# Patient Record
Sex: FEMALE | Race: WHITE | NOT HISPANIC OR LATINO | Employment: FULL TIME | URBAN - METROPOLITAN AREA
[De-identification: names, ages, dates, MRNs, and addresses within clinical notes are randomized per-mention and may not be internally consistent; named-entity substitution may affect disease eponyms.]

---

## 2017-01-16 DIAGNOSIS — D25.9 UTERINE LEIOMYOMA, UNSPECIFIED LOCATION: ICD-10-CM

## 2017-01-16 NOTE — TELEPHONE ENCOUNTER
Irene desire refill of OCP. Annual exam scheduled   Patient Active Problem List   Diagnosis    Bilateral hearing loss    Fibroids     Prior to Admission medications    Medication Sig Start Date End Date Taking? Authorizing Provider   clotrimazole-betamethasone 1-0.05% (LOTRISONE) cream Apply to affected area 2 times daily as needed for itching 2/8/16 2/7/17  Dee Worley MD   fluticasone (FLONASE) 50 mcg/actuation nasal spray 2 sprays by Each Nare route once daily. 9/15/16   Kayleigh Kauffman NP   LO LOESTRIN FE 1 mg-10 mcg (24)/10 mcg (2) Tab Take 1 tablet by mouth once daily. 2/8/16   Dee Worley MD   meclizine (ANTIVERT) 12.5 mg tablet Take 1 tablet (12.5 mg total) by mouth 3 (three) times daily as needed. 6/6/13 6/16/13  Lesli Dowell MD

## 2017-01-17 RX ORDER — NORETHINDRONE ACETATE AND ETHINYL ESTRADIOL, ETHINYL ESTRADIOL AND FERROUS FUMARATE 1MG-10(24)
1 KIT ORAL DAILY
Qty: 84 TABLET | Refills: 0 | Status: SHIPPED | OUTPATIENT
Start: 2017-01-17 | End: 2017-02-20 | Stop reason: SDUPTHER

## 2017-02-20 ENCOUNTER — HOSPITAL ENCOUNTER (OUTPATIENT)
Dept: RADIOLOGY | Facility: HOSPITAL | Age: 52
Discharge: HOME | End: 2017-02-20
Attending: OBSTETRICS & GYNECOLOGY
Payer: COMMERCIAL

## 2017-02-20 ENCOUNTER — OFFICE VISIT (OUTPATIENT)
Dept: OBSTETRICS AND GYNECOLOGY | Facility: CLINIC | Age: 52
End: 2017-02-20
Payer: COMMERCIAL

## 2017-02-20 VITALS
HEART RATE: 78 BPM | HEIGHT: 61 IN | DIASTOLIC BLOOD PRESSURE: 68 MMHG | BODY MASS INDEX: 35.68 KG/M2 | WEIGHT: 189 LBS | RESPIRATION RATE: 10 BRPM | SYSTOLIC BLOOD PRESSURE: 120 MMHG

## 2017-02-20 DIAGNOSIS — Z01.419 WELL WOMAN EXAM WITH ROUTINE GYNECOLOGICAL EXAM: Primary | ICD-10-CM

## 2017-02-20 DIAGNOSIS — Z12.31 ENCOUNTER FOR SCREENING MAMMOGRAM FOR BREAST CANCER: ICD-10-CM

## 2017-02-20 DIAGNOSIS — D25.9 UTERINE LEIOMYOMA, UNSPECIFIED LOCATION: ICD-10-CM

## 2017-02-20 DIAGNOSIS — Z12.4 CERVICAL CANCER SCREENING: ICD-10-CM

## 2017-02-20 PROCEDURE — 88175 CYTOPATH C/V AUTO FLUID REDO: CPT

## 2017-02-20 PROCEDURE — 99396 PREV VISIT EST AGE 40-64: CPT | Mod: S$GLB,,, | Performed by: OBSTETRICS & GYNECOLOGY

## 2017-02-20 PROCEDURE — 77063 BREAST TOMOSYNTHESIS BI: CPT | Mod: 26,,, | Performed by: RADIOLOGY

## 2017-02-20 PROCEDURE — 77067 SCR MAMMO BI INCL CAD: CPT | Mod: 26,,, | Performed by: RADIOLOGY

## 2017-02-20 PROCEDURE — 99999 PR PBB SHADOW E&M-EST. PATIENT-LVL III: CPT | Mod: PBBFAC,,, | Performed by: OBSTETRICS & GYNECOLOGY

## 2017-02-20 PROCEDURE — 77067 SCR MAMMO BI INCL CAD: CPT | Mod: TC

## 2017-02-20 RX ORDER — NORETHINDRONE ACETATE AND ETHINYL ESTRADIOL, ETHINYL ESTRADIOL AND FERROUS FUMARATE 1MG-10(24)
1 KIT ORAL DAILY
Qty: 28 TABLET | Refills: 11 | Status: SHIPPED | OUTPATIENT
Start: 2017-02-20 | End: 2018-02-06 | Stop reason: SDUPTHER

## 2017-02-21 NOTE — PROGRESS NOTES
Subjective:    Patient ID: Irene Galo is a 51 y.o. female.     Chief Complaint: Annual Well Woman Exam     History of Present Illness:  Irene presents today for Annual Well Woman exam. She states her menses are irregular and less frequent.  She reports that her menstrual flow is light with minimal cramping. She denies pelvic pain. She is known to have uterine fibroids. She denies breast tenderness, masses, nipple discharge.  She is due for screening mammogram.  She reports no problems with urination. Bowel movements have not significantly changed. Her current contraceptive method is OCPs which she desires to continue.  She has health maintenance labs done at work and is current with this.      The following portions of the patient's history were reviewed and updated as appropriate: allergies, current medications, past family history, past medical history, past social history, past surgical history and problem list.      Menstrual History:   Patient's last menstrual period was 2017..     OB History      Para Term  AB TAB SAB Ectopic Multiple Living    4 4 4      1 5            Review of Systems   All other systems reviewed and are negative.        Objective:    Vital Signs:  Vitals:    17 0802   BP: 120/68   Pulse: 78   Resp: 10       Physical Exam:  General:  alert; normal appearing, well-nourished female   Skin:  Skin color, texture, turgor normal. No rashes or lesions   HEENT:  conjunctivae/corneas clear.    Neck: supple, trachea midline   Respiratory:  clear to auscultation bilaterally   Heart:  regular rate and rhythm   Breasts: Symmetrical;  Nipples are protruding and have no nipple discharge. No palpable masses, erythema, skin changes, tenderness, or adenopathy.   Abdomen:  soft, non-tender. Bowel sounds normal. No masses,  no organomegaly   Pelvis: External genitalia: normal general appearance  Urinary system: urethral meatus normal, bladder nontender  Vaginal: normal mucosa without  prolapse or lesions  Cervix: normal appearance  Uterus: enlarged/upper limits of normal in size; nontender  Adnexa: normal bimanual exam; nontender; no palpable masses   Extremities: Normal ROM; no edema, no cyanosis   Neurologial: Normal strength and tone. No focal numbness or weakness. Reflexes 2+ and equal.   Psychiatric: normal mood, speech, dress, and thought processes           Assessment:      1. Well woman exam with routine gynecological exam    2. Cervical cancer screening    3. Uterine leiomyoma, unspecified location    4. Encounter for screening mammogram for breast cancer          Plan:      Well woman exam with routine gynecological exam    Cervical cancer screening  -     Liquid-based pap smear, screening    Uterine leiomyoma, unspecified location  -     LO LOESTRIN FE 1 mg-10 mcg (24)/10 mcg (2) Tab; Take 1 tablet by mouth once daily.  Dispense: 28 tablet; Refill: 11    Encounter for screening mammogram for breast cancer  -     Cancel: Mammo Digital Screening Bilat with CAD; Future; Expected date: 2/20/17  -     Mammo Digital Screening Bilat with Tomosynthesis_CAD; Future; Expected date: 2/20/17        COUNSELING:  Irene was counseled on A.C.O.G. Pap guidelines and recommendations for yearly pelvic exams in addition to recommendations for yearly mammograms and monthly self breast exams. In addition she was counseled to see her PCP for other health maintenance

## 2017-10-02 ENCOUNTER — OFFICE VISIT (OUTPATIENT)
Dept: INTERNAL MEDICINE | Facility: CLINIC | Age: 52
End: 2017-10-02
Payer: COMMERCIAL

## 2017-10-02 VITALS
HEART RATE: 66 BPM | BODY MASS INDEX: 31.81 KG/M2 | RESPIRATION RATE: 18 BRPM | HEIGHT: 65 IN | WEIGHT: 190.94 LBS | DIASTOLIC BLOOD PRESSURE: 70 MMHG | SYSTOLIC BLOOD PRESSURE: 124 MMHG

## 2017-10-02 DIAGNOSIS — R79.89 ABNORMAL TSH: Primary | ICD-10-CM

## 2017-10-02 PROCEDURE — 99999 PR PBB SHADOW E&M-EST. PATIENT-LVL III: CPT | Mod: PBBFAC,,, | Performed by: INTERNAL MEDICINE

## 2017-10-02 PROCEDURE — 3008F BODY MASS INDEX DOCD: CPT | Mod: S$GLB,,, | Performed by: INTERNAL MEDICINE

## 2017-10-02 PROCEDURE — 99213 OFFICE O/P EST LOW 20 MIN: CPT | Mod: S$GLB,,, | Performed by: INTERNAL MEDICINE

## 2017-10-02 NOTE — PROGRESS NOTES
Subjective:       Patient ID: Irene Galo is a 51 y.o. female.    Chief Complaint: Follow-up (abnormal lab)    Irene Galo is a 51 y.o. female  Here for abnormal TSH ; mildly ; 0.039 at [facility] [employer] [facility];  Free t4 is normal   Asymptomatic for thyroid disease       Review of Systems   Constitutional: Negative for activity change, chills, fatigue, fever and unexpected weight change.   HENT: Negative for congestion, ear pain, hearing loss, rhinorrhea, sore throat and trouble swallowing.    Eyes: Negative for pain, discharge and visual disturbance.   Respiratory: Negative for cough, chest tightness, shortness of breath and wheezing.    Cardiovascular: Negative for chest pain, palpitations and leg swelling.   Gastrointestinal: Negative for abdominal distention, abdominal pain, blood in stool, constipation, diarrhea and vomiting.   Endocrine: Negative for polydipsia and polyuria.   Genitourinary: Negative for difficulty urinating, dysuria, flank pain, frequency, hematuria and menstrual problem.   Musculoskeletal: Positive for arthralgias. Negative for back pain, gait problem, joint swelling, neck pain and neck stiffness.   Skin: Negative for rash and wound.   Neurological: Negative for dizziness, seizures, speech difficulty, weakness, light-headedness and headaches.   Psychiatric/Behavioral: Negative for confusion and dysphoric mood.       Objective:      Physical Exam   Constitutional: She is oriented to person, place, and time. She appears well-developed and well-nourished.   HENT:   Head: Normocephalic and atraumatic.   Right Ear: External ear normal.   Left Ear: External ear normal.   Nose: Nose normal.   Mouth/Throat: Oropharynx is clear and moist. No oropharyngeal exudate.   Eyes: Conjunctivae and EOM are normal. Pupils are equal, round, and reactive to light.   Neck: Normal range of motion. Neck supple. No thyromegaly present.   Cardiovascular: Normal rate, regular rhythm, normal heart sounds and intact distal  pulses.    No murmur heard.  Pulmonary/Chest: Effort normal and breath sounds normal. No respiratory distress. She has no wheezes. She has no rales. She exhibits no tenderness.   Abdominal: Soft. Bowel sounds are normal. She exhibits no distension and no mass. There is no tenderness. There is no rebound and no guarding.   Musculoskeletal: Normal range of motion. She exhibits no edema.   Neurological: She is alert and oriented to person, place, and time. She has normal reflexes.   Skin: Skin is warm and dry.   Psychiatric: She has a normal mood and affect. Her behavior is normal. Judgment and thought content normal.   Nursing note and vitals reviewed.      Assessment:       1. Abnormal TSH        Plan:   Irene was seen today for follow-up.    Diagnoses and all orders for this visit:    Abnormal TSH  -     TSH; Future  -     T3; Future  -     T4, free; Future    tsh is suppressed slightly  No over hyperthyroidism.  Repeat labs in    Reassured her about labs .

## 2018-01-04 ENCOUNTER — CLINICAL SUPPORT (OUTPATIENT)
Dept: INTERNAL MEDICINE | Facility: CLINIC | Age: 53
End: 2018-01-04
Payer: COMMERCIAL

## 2018-01-04 DIAGNOSIS — R79.89 ABNORMAL TSH: ICD-10-CM

## 2018-01-04 LAB
T3 SERPL-MCNC: 117 NG/DL
T4 FREE SERPL-MCNC: 1.02 NG/DL
TSH SERPL DL<=0.005 MIU/L-ACNC: 0.66 UIU/ML

## 2018-01-04 PROCEDURE — 84439 ASSAY OF FREE THYROXINE: CPT

## 2018-01-04 PROCEDURE — 36415 COLL VENOUS BLD VENIPUNCTURE: CPT | Mod: S$GLB,,, | Performed by: INTERNAL MEDICINE

## 2018-01-04 PROCEDURE — 84480 ASSAY TRIIODOTHYRONINE (T3): CPT

## 2018-01-04 PROCEDURE — 84443 ASSAY THYROID STIM HORMONE: CPT

## 2018-01-04 PROCEDURE — 99999 PR PBB SHADOW E&M-EST. PATIENT-LVL I: CPT | Mod: PBBFAC,,,

## 2018-01-05 ENCOUNTER — PATIENT MESSAGE (OUTPATIENT)
Dept: OBSTETRICS AND GYNECOLOGY | Facility: CLINIC | Age: 53
End: 2018-01-05

## 2018-01-05 ENCOUNTER — TELEPHONE (OUTPATIENT)
Dept: OBSTETRICS AND GYNECOLOGY | Facility: CLINIC | Age: 53
End: 2018-01-05

## 2018-01-05 DIAGNOSIS — Z12.31 ENCOUNTER FOR SCREENING MAMMOGRAM FOR BREAST CANCER: Primary | ICD-10-CM

## 2018-01-11 ENCOUNTER — OFFICE VISIT (OUTPATIENT)
Dept: INTERNAL MEDICINE | Facility: CLINIC | Age: 53
End: 2018-01-11
Payer: COMMERCIAL

## 2018-01-11 VITALS
OXYGEN SATURATION: 97 % | WEIGHT: 193.81 LBS | RESPIRATION RATE: 16 BRPM | HEIGHT: 65 IN | BODY MASS INDEX: 32.29 KG/M2 | SYSTOLIC BLOOD PRESSURE: 128 MMHG | HEART RATE: 69 BPM | DIASTOLIC BLOOD PRESSURE: 82 MMHG

## 2018-01-11 DIAGNOSIS — R79.89 ABNORMAL TSH: Primary | ICD-10-CM

## 2018-01-11 PROCEDURE — 99999 PR PBB SHADOW E&M-EST. PATIENT-LVL III: CPT | Mod: PBBFAC,,, | Performed by: INTERNAL MEDICINE

## 2018-01-11 PROCEDURE — 99213 OFFICE O/P EST LOW 20 MIN: CPT | Mod: S$GLB,,, | Performed by: INTERNAL MEDICINE

## 2018-01-11 NOTE — PROGRESS NOTES
Subjective:       Patient ID: Irene Galo is a 52 y.o. female.    Chief Complaint: Thyroid Problem (follow up with lab review)    Irene Galo is a 52 y.o. female  Here for for following  For her thyroid test .      Thyroid Problem   Patient reports no constipation, diarrhea, fatigue, menstrual problem or palpitations.     Review of Systems   Constitutional: Negative for activity change, chills, fatigue, fever and unexpected weight change.   HENT: Negative for congestion, ear pain, hearing loss, rhinorrhea, sore throat and trouble swallowing.    Eyes: Negative for pain, discharge and visual disturbance.   Respiratory: Negative for cough, chest tightness, shortness of breath and wheezing.    Cardiovascular: Negative for chest pain, palpitations and leg swelling.   Gastrointestinal: Negative for abdominal distention, abdominal pain, blood in stool, constipation, diarrhea and vomiting.   Endocrine: Negative for polydipsia and polyuria.   Genitourinary: Negative for difficulty urinating, dysuria, flank pain, frequency, hematuria and menstrual problem.   Musculoskeletal: Positive for arthralgias. Negative for back pain, gait problem, joint swelling, neck pain and neck stiffness.   Skin: Negative for rash and wound.   Neurological: Negative for dizziness, seizures, speech difficulty, weakness, light-headedness and headaches.   Psychiatric/Behavioral: Negative for confusion and dysphoric mood.       Objective:      Physical Exam   Constitutional: She is oriented to person, place, and time. She appears well-developed and well-nourished.   HENT:   Head: Normocephalic and atraumatic.   Right Ear: External ear normal.   Left Ear: External ear normal.   Nose: Nose normal.   Mouth/Throat: Oropharynx is clear and moist. No oropharyngeal exudate.   Eyes: Conjunctivae and EOM are normal. Pupils are equal, round, and reactive to light.   Neck: Normal range of motion. Neck supple. No thyromegaly present.   Cardiovascular: Normal  rate, regular rhythm, normal heart sounds and intact distal pulses.    No murmur heard.  Pulmonary/Chest: Effort normal and breath sounds normal. No respiratory distress. She has no wheezes. She has no rales. She exhibits no tenderness.   Abdominal: Soft. Bowel sounds are normal. She exhibits no distension and no mass. There is no tenderness. There is no rebound and no guarding.   Musculoskeletal: Normal range of motion. She exhibits no edema.   Neurological: She is alert and oriented to person, place, and time. She has normal reflexes.   Skin: Skin is warm and dry.   Psychiatric: She has a normal mood and affect. Her behavior is normal. Judgment and thought content normal.   Nursing note and vitals reviewed.      Assessment:       1. Abnormal TSH        Plan:   Irene was seen today for thyroid problem.    Diagnoses and all orders for this visit:    Abnormal TSH    resolved.

## 2018-02-06 DIAGNOSIS — D25.9 UTERINE LEIOMYOMA, UNSPECIFIED LOCATION: ICD-10-CM

## 2018-02-06 RX ORDER — NORETHINDRONE ACETATE AND ETHINYL ESTRADIOL, ETHINYL ESTRADIOL AND FERROUS FUMARATE 1MG-10(24)
1 KIT ORAL DAILY
Qty: 28 TABLET | Refills: 1 | Status: SHIPPED | OUTPATIENT
Start: 2018-02-06 | End: 2018-03-06 | Stop reason: SDUPTHER

## 2018-02-06 NOTE — TELEPHONE ENCOUNTER
Irene desire refill of OCP. Annual scheduled.   Patient Active Problem List   Diagnosis    Bilateral hearing loss    Fibroids     Prior to Admission medications    Medication Sig Start Date End Date Taking? Authorizing Provider   LO LOESTRIN FE 1 mg-10 mcg (24)/10 mcg (2) Tab Take 1 tablet by mouth once daily. 2/20/17   Dee Worley MD   meclizine (ANTIVERT) 12.5 mg tablet Take 1 tablet (12.5 mg total) by mouth 3 (three) times daily as needed. 6/6/13 6/16/13  Lesli Dowell MD

## 2018-02-06 NOTE — TELEPHONE ENCOUNTER
----- Message from Pilar Perez sent at 2/6/2018  4:15 PM CST -----  Contact: self  Irene Galo  MRN: [id number]  Home Phone      [phone]  Work Phone      Not on file.  Mobile          [phone]    Patient Care Team:  Charles Mijares MD as PCP - General (Family Medicine)  Dee Worley MD as Obstetrician (Obstetrics)  OB? No  What phone number can you be reached at? [phone]  Message: needs medication refill before her next appt with arianne

## 2018-03-02 ENCOUNTER — HOSPITAL ENCOUNTER (OUTPATIENT)
Dept: RADIOLOGY | Facility: HOSPITAL | Age: 53
Discharge: HOME | End: 2018-03-02
Attending: OBSTETRICS & GYNECOLOGY
Payer: COMMERCIAL

## 2018-03-02 VITALS — WEIGHT: 193 LBS | HEIGHT: 65 IN | BODY MASS INDEX: 32.15 KG/M2

## 2018-03-02 DIAGNOSIS — Z12.31 ENCOUNTER FOR SCREENING MAMMOGRAM FOR BREAST CANCER: ICD-10-CM

## 2018-03-02 PROCEDURE — 77067 SCR MAMMO BI INCL CAD: CPT | Mod: TC

## 2018-03-02 PROCEDURE — 77067 SCR MAMMO BI INCL CAD: CPT | Mod: 26,,, | Performed by: RADIOLOGY

## 2018-03-02 PROCEDURE — 77063 BREAST TOMOSYNTHESIS BI: CPT | Mod: 26,,, | Performed by: RADIOLOGY

## 2018-03-06 ENCOUNTER — OFFICE VISIT (OUTPATIENT)
Dept: OBSTETRICS AND GYNECOLOGY | Facility: CLINIC | Age: 53
End: 2018-03-06
Payer: COMMERCIAL

## 2018-03-06 VITALS
BODY MASS INDEX: 31.32 KG/M2 | HEIGHT: 65 IN | HEART RATE: 65 BPM | RESPIRATION RATE: 15 BRPM | DIASTOLIC BLOOD PRESSURE: 82 MMHG | SYSTOLIC BLOOD PRESSURE: 126 MMHG | WEIGHT: 188 LBS

## 2018-03-06 DIAGNOSIS — Z01.419 WELL WOMAN EXAM WITH ROUTINE GYNECOLOGICAL EXAM: Primary | ICD-10-CM

## 2018-03-06 DIAGNOSIS — D25.9 UTERINE LEIOMYOMA, UNSPECIFIED LOCATION: ICD-10-CM

## 2018-03-06 DIAGNOSIS — Z30.41 SURVEILLANCE OF CONTRACEPTIVE PILL: ICD-10-CM

## 2018-03-06 PROCEDURE — 99396 PREV VISIT EST AGE 40-64: CPT | Mod: S$GLB,,, | Performed by: OBSTETRICS & GYNECOLOGY

## 2018-03-06 PROCEDURE — 99999 PR PBB SHADOW E&M-EST. PATIENT-LVL III: CPT | Mod: PBBFAC,,, | Performed by: OBSTETRICS & GYNECOLOGY

## 2018-03-06 RX ORDER — NORETHINDRONE ACETATE AND ETHINYL ESTRADIOL, ETHINYL ESTRADIOL AND FERROUS FUMARATE 1MG-10(24)
1 KIT ORAL DAILY
Qty: 84 TABLET | Refills: 4 | Status: SHIPPED | OUTPATIENT
Start: 2018-03-06 | End: 2019-03-19 | Stop reason: SDUPTHER

## 2018-03-06 NOTE — PROGRESS NOTES
Subjective:    Patient ID: Irene Galo is a 52 y.o. female.     Chief Complaint: Annual Well Woman Exam     History of Present Illness:  Irene presents today for Annual Well Woman exam. She states her menses are usually regular every month without intermenstrual spotting. She reports that her menstrual flow is normal.  . She denies pelvic pain. She denies breast tenderness, masses, nipple discharge.  She had negative mammogram last week.  She reports no problems with urination. Bowel movements are normal.  Her current contraceptive method is 's vasectomy.  OCPs which were started due to leiomyoma and she reports no problems.  She is current with health maintenance.     Past Medical History:   Diagnosis Date    Bilateral hearing loss     Ear pressure     Pain in ear     Unspecified tinnitus      Past Surgical History:   Procedure Laterality Date    lasix  2000    both eyes    STAPEDECTOMY  2009     Review of patient's allergies indicates:   Allergen Reactions    No known drug allergies      Current Outpatient Prescriptions on File Prior to Visit   Medication Sig Dispense Refill    [DISCONTINUED] LO LOESTRIN FE 1 mg-10 mcg (24)/10 mcg (2) Tab Take 1 tablet by mouth once daily. 28 tablet 1    [DISCONTINUED] meclizine (ANTIVERT) 12.5 mg tablet Take 1 tablet (12.5 mg total) by mouth 3 (three) times daily as needed. 15 tablet 0     No current facility-administered medications on file prior to visit.      Social History     Social History    Marital status:      Spouse name: N/A    Number of children: N/A    Years of education: N/A     Social History Main Topics    Smoking status: Never Smoker    Smokeless tobacco: Never Used    Alcohol use Yes      Comment: socially    Drug use: No    Sexual activity: Yes     Partners: Male     Birth control/ protection: Other-see comments, OCP      Comment: -- with vasectomy     Other Topics Concern    None     Social History Narrative     None     Family History   Problem Relation Age of Onset    Cancer Father      Lung    Breast cancer Paternal Grandmother 70    Colon cancer Neg Hx     Ovarian cancer Neg Hx      The following portions of the patient's history were reviewed and updated as appropriate: allergies, current medications, past family history, past medical history, past social history, past surgical history and problem list.      Menstrual History:   Patient's last menstrual period was 2018 (exact date)..     OB History      Para Term  AB Living    4 4 4     5    SAB TAB Ectopic Multiple Live Births          1              Review of Systems   All other systems reviewed and are negative.        Objective:    Vital Signs:  Vitals:    18 1523   BP: 126/82   Pulse: 65   Resp: 15       Physical Exam:  General:  alert; oriented; well-nourished female   Skin:  Skin color, texture, turgor normal. No rashes or lesions   HEENT:  conjunctivae/corneas clear.    Neck: supple, trachea midline   Respiratory:  clear to auscultation bilaterally   Heart:  regular rate and rhythm, S1, S2 normal, no murmur, click, rub or gallop   Breasts: Symmetrical;  Nipples are protruding and have no nipple discharge. No palpable masses, erythema, skin changes, tenderness, or adenopathy.   Abdomen:  soft, non-tender. Bowel sounds normal. No masses,  no organomegaly   Pelvis: External genitalia: normal general appearance  Urinary system: urethral meatus normal, bladder nontender  Vaginal: normal mucosa; slight cystocele present; uterus well supported; no rectocele  Cervix: normal appearance; nontender  Uterus: enlarged/upper limits of normal in size; nontender  Adnexa: normal bimanual exam; nontender; no palpable masses   Extremities: Normal ROM; no edema, no cyanosis   Neurologial: Normal strength and tone. No focal numbness or weakness. Reflexes 2+ and equal.   Psychiatric: normal mood, speech, dress, and thought processes            Assessment:      1. Well woman exam with routine gynecological exam    2. Surveillance of contraceptive pill    3. Uterine leiomyoma, unspecified location          Plan:      Well woman exam with routine gynecological exam    Surveillance of contraceptive pill  -     LO LOESTRIN FE 1 mg-10 mcg (24)/10 mcg (2) Tab; Take 1 tablet by mouth once daily.  Dispense: 84 tablet; Refill: 4    Uterine leiomyoma, unspecified location  -     LO LOESTRIN FE 1 mg-10 mcg (24)/10 mcg (2) Tab; Take 1 tablet by mouth once daily.  Dispense: 84 tablet; Refill: 4        COUNSELING:  Irene was counseled on A.C.O.G. Pap guidelines and recommendations for yearly pelvic exams in addition to recommendations for yearly mammograms and monthly self breast exams. In addition she was counseled on avoiding heavy lifting.  She is to see her PCP for other health maintenance

## 2018-03-29 DIAGNOSIS — Z12.11 COLON CANCER SCREENING: ICD-10-CM

## 2018-11-29 ENCOUNTER — LAB VISIT (OUTPATIENT)
Dept: LAB | Facility: HOSPITAL | Age: 53
End: 2018-11-29
Attending: INTERNAL MEDICINE
Payer: COMMERCIAL

## 2018-11-29 DIAGNOSIS — Z12.11 COLON CANCER SCREENING: ICD-10-CM

## 2018-11-29 LAB — HEMOCCULT STL QL IA: POSITIVE

## 2018-11-29 PROCEDURE — 82274 ASSAY TEST FOR BLOOD FECAL: CPT

## 2018-12-03 ENCOUNTER — TELEPHONE (OUTPATIENT)
Dept: INTERNAL MEDICINE | Facility: CLINIC | Age: 53
End: 2018-12-03

## 2018-12-03 DIAGNOSIS — Z12.11 SPECIAL SCREENING FOR MALIGNANT NEOPLASMS, COLON: ICD-10-CM

## 2018-12-03 DIAGNOSIS — K92.1 BLOOD IN STOOL: Primary | ICD-10-CM

## 2018-12-03 NOTE — TELEPHONE ENCOUNTER
Her stool test is positive for blood .  She must do colonoscopy ; ordered   Please schedule her   If colonoscopy is clear she may consider EGD with GI

## 2018-12-31 RX ORDER — SODIUM, POTASSIUM,MAG SULFATES 17.5-3.13G
1 SOLUTION, RECONSTITUTED, ORAL ORAL ONCE
Qty: 354 ML | Refills: 0 | Status: ON HOLD | OUTPATIENT
Start: 2019-01-09 | End: 2019-01-10 | Stop reason: HOSPADM

## 2018-12-31 RX ORDER — BISACODYL 5 MG
20 TABLET, DELAYED RELEASE (ENTERIC COATED) ORAL ONCE
Qty: 4 TABLET | Refills: 0 | Status: ON HOLD | OUTPATIENT
Start: 2019-01-09 | End: 2019-01-10 | Stop reason: HOSPADM

## 2019-01-02 ENCOUNTER — HOSPITAL ENCOUNTER (OUTPATIENT)
Dept: PREADMISSION TESTING | Facility: HOSPITAL | Age: 54
Discharge: HOME | End: 2019-01-02
Attending: COLON & RECTAL SURGERY
Payer: COMMERCIAL

## 2019-01-02 VITALS — BODY MASS INDEX: 32.29 KG/M2 | WEIGHT: 193.81 LBS | HEIGHT: 65 IN

## 2019-01-02 DIAGNOSIS — Z12.11 ENCOUNTER FOR SCREENING COLONOSCOPY: Primary | ICD-10-CM

## 2019-01-02 RX ORDER — CETIRIZINE HYDROCHLORIDE 10 MG/1
10 TABLET ORAL NIGHTLY
COMMUNITY
End: 2019-10-30

## 2019-01-02 NOTE — DISCHARGE INSTRUCTIONS
Follow instructions as written on Dr. Augustin's Colonoscopy Prep Sheet    Colonoscopy Outpatient procedure instructions    Prep Review  Nothing to eat or drink after midnight unless your doctor tells you differently. Dr. Augustin patients have nothing to eat or drink after morning prep is complete      Take medications as instructed by your doctor.    Wear something comfortable that is easy for you to take off and put on.   Do not wear any makeup, jewelry, or body piercings. Leave valuables at home or let your family member keep them for you. Do not bring them to the Surgery area.     Date/Day of Procedure: Thursday 1-10-19    Arrival time: Someone will call you the workday day before the procedure  between 1pm and 5pm to give you an arrival time   If asked to report to the hospital before 7:00 am report to the Emergency Room.  If asked to report to the hospital at 7:00 a.m. or later report to Patient Registration  It is not necessary to report earlier than the time you are told.   Ignore any automated/computer generated calls telling to what time to report to the hospital.   Plan to be at the hospital for about 4 hours, however, it could be longer.

## 2019-01-07 ENCOUNTER — ANESTHESIA EVENT (OUTPATIENT)
Dept: ENDOSCOPY | Facility: HOSPITAL | Age: 54
End: 2019-01-07
Payer: COMMERCIAL

## 2019-01-10 ENCOUNTER — HOSPITAL ENCOUNTER (OUTPATIENT)
Facility: HOSPITAL | Age: 54
Discharge: HOME | End: 2019-01-10
Attending: COLON & RECTAL SURGERY | Admitting: COLON & RECTAL SURGERY
Payer: COMMERCIAL

## 2019-01-10 ENCOUNTER — ANESTHESIA (OUTPATIENT)
Dept: ENDOSCOPY | Facility: HOSPITAL | Age: 54
End: 2019-01-10
Payer: COMMERCIAL

## 2019-01-10 ENCOUNTER — TELEPHONE (OUTPATIENT)
Dept: SURGERY | Facility: CLINIC | Age: 54
End: 2019-01-10

## 2019-01-10 DIAGNOSIS — D37.5: Primary | ICD-10-CM

## 2019-01-10 DIAGNOSIS — Z12.11 ENCOUNTER FOR SCREENING COLONOSCOPY: Primary | ICD-10-CM

## 2019-01-10 LAB — B-HCG UR QL: NEGATIVE

## 2019-01-10 PROCEDURE — 81025 URINE PREGNANCY TEST: CPT

## 2019-01-10 PROCEDURE — 88305 TISSUE EXAM BY PATHOLOGIST: CPT | Mod: 26,,, | Performed by: PATHOLOGY

## 2019-01-10 PROCEDURE — 37000009 HC ANESTHESIA EA ADD 15 MINS: Performed by: COLON & RECTAL SURGERY

## 2019-01-10 PROCEDURE — 45380 PR COLONOSCOPY,BIOPSY: ICD-10-PCS | Mod: 33,,, | Performed by: COLON & RECTAL SURGERY

## 2019-01-10 PROCEDURE — 88305 TISSUE EXAM BY PATHOLOGIST: CPT | Performed by: PATHOLOGY

## 2019-01-10 PROCEDURE — 45380 COLONOSCOPY AND BIOPSY: CPT | Mod: 33,,, | Performed by: COLON & RECTAL SURGERY

## 2019-01-10 PROCEDURE — 37000008 HC ANESTHESIA 1ST 15 MINUTES: Performed by: COLON & RECTAL SURGERY

## 2019-01-10 PROCEDURE — 63600175 PHARM REV CODE 636 W HCPCS: Performed by: NURSE ANESTHETIST, CERTIFIED REGISTERED

## 2019-01-10 PROCEDURE — 27201012 HC FORCEPS, HOT/COLD, DISP: Performed by: COLON & RECTAL SURGERY

## 2019-01-10 PROCEDURE — 88305 TISSUE SPECIMEN TO PATHOLOGY - SURGERY: ICD-10-PCS | Mod: 26,,, | Performed by: PATHOLOGY

## 2019-01-10 PROCEDURE — 25000003 PHARM REV CODE 250: Performed by: COLON & RECTAL SURGERY

## 2019-01-10 PROCEDURE — 45331 SIGMOIDOSCOPY AND BIOPSY: CPT | Performed by: COLON & RECTAL SURGERY

## 2019-01-10 PROCEDURE — 45380 COLONOSCOPY AND BIOPSY: CPT | Mod: PT,74 | Performed by: COLON & RECTAL SURGERY

## 2019-01-10 PROCEDURE — 00811 ANES LWR INTST NDSC NOS: CPT | Mod: QZ,P1,33 | Performed by: NURSE ANESTHETIST, CERTIFIED REGISTERED

## 2019-01-10 RX ORDER — PROPOFOL 10 MG/ML
INJECTION, EMULSION INTRAVENOUS
Status: DISCONTINUED | OUTPATIENT
Start: 2019-01-10 | End: 2019-01-10

## 2019-01-10 RX ORDER — LIDOCAINE HCL/PF 100 MG/5ML
SYRINGE (ML) INTRAVENOUS
Status: DISCONTINUED | OUTPATIENT
Start: 2019-01-10 | End: 2019-01-10

## 2019-01-10 RX ORDER — SODIUM CHLORIDE, SODIUM LACTATE, POTASSIUM CHLORIDE, CALCIUM CHLORIDE 600; 310; 30; 20 MG/100ML; MG/100ML; MG/100ML; MG/100ML
INJECTION, SOLUTION INTRAVENOUS CONTINUOUS
Status: DISCONTINUED | OUTPATIENT
Start: 2019-01-10 | End: 2019-01-10 | Stop reason: HOSPADM

## 2019-01-10 RX ORDER — PROPOFOL 10 MG/ML
INJECTION, EMULSION INTRAVENOUS CONTINUOUS PRN
Status: DISCONTINUED | OUTPATIENT
Start: 2019-01-10 | End: 2019-01-10

## 2019-01-10 RX ADMIN — PROPOFOL 150 MCG/KG/MIN: 10 INJECTION, EMULSION INTRAVENOUS at 11:01

## 2019-01-10 RX ADMIN — LIDOCAINE HYDROCHLORIDE 50 MG: 20 INJECTION, SOLUTION INTRAVENOUS at 11:01

## 2019-01-10 RX ADMIN — PROPOFOL 90 MG: 10 INJECTION, EMULSION INTRAVENOUS at 11:01

## 2019-01-10 RX ADMIN — SODIUM CHLORIDE, SODIUM LACTATE, POTASSIUM CHLORIDE, AND CALCIUM CHLORIDE: .6; .31; .03; .02 INJECTION, SOLUTION INTRAVENOUS at 10:01

## 2019-01-10 NOTE — ANESTHESIA POSTPROCEDURE EVALUATION
Anesthesia Post Evaluation    Patient: Irene Galo    Procedure(s) Performed: Procedure(s) (LRB):  COLONOSCOPY (N/A)    Final Anesthesia Type: general  Patient location during evaluation: PACU  Patient participation: Yes- Able to Participate  Level of consciousness: awake and alert and oriented  Post-procedure vital signs: reviewed and stable  Pain management: adequate  Airway patency: patent  PONV status at discharge: No PONV  Anesthetic complications: no      Cardiovascular status: blood pressure returned to baseline and hemodynamically stable  Respiratory status: unassisted, spontaneous ventilation and room air  Hydration status: euvolemic  Follow-up not needed.        Visit Vitals  /65 (BP Location: Left arm, Patient Position: Lying)   Pulse 66   Temp 37 °C (98.6 °F) (Tympanic)   Resp 18   SpO2 100%       Pain/Bon Score: Bon Score: 10 (1/10/2019 12:03 PM)

## 2019-01-10 NOTE — H&P
Procedure : Colonoscopy    Indication(s):  asymptomatic screening exam    Review of patient's allergies indicates:   Allergen Reactions    No known drug allergies        Past Medical History:   Diagnosis Date    Bilateral hearing loss     Ear pressure     Pain in ear     Unspecified tinnitus     Uterine fibroid        Prior to Admission medications    Medication Sig Start Date End Date Taking? Authorizing Provider   cetirizine (ZYRTEC) 10 MG tablet Take 10 mg by mouth once daily.   Yes Historical Provider, MD AYALA LOESTRIN FE 1 mg-10 mcg (24)/10 mcg (2) Tab Take 1 tablet by mouth once daily. 3/6/18  Yes Dee Worley MD   bisacodyl (DULCOLAX) 5 mg EC tablet Take 4 tablets (20 mg total) by mouth once per Dr. Augustin's instruction sheet for 1 dose 1/9/19 1/9/19  Zachery Augustin MD   multivitamin with minerals tablet Take 1 tablet by mouth once daily.    Historical Provider, MD   sodium,potassium,mag sulfates (SUPREP BOWEL PREP KIT) 17.5-3.13-1.6 gram SolR Take 354 milliliters by mouth once per Dr. Augustin's instruction sheet for 1 dose 1/9/19 1/9/19  Zachery Augustin MD       Sedation Problems: NO    Family History   Problem Relation Age of Onset    Cancer Father         Lung    Diverticulitis Father     Breast cancer Paternal Grandmother 70    Colon cancer Neg Hx     Ovarian cancer Neg Hx        Fam Hx of Sedation Problems: NO    Social History     Socioeconomic History    Marital status:      Spouse name: Not on file    Number of children: Not on file    Years of education: Not on file    Highest education level: Not on file   Social Needs    Financial resource strain: Not on file    Food insecurity - worry: Not on file    Food insecurity - inability: Not on file    Transportation needs - medical: Not on file    Transportation needs - non-medical: Not on file   Occupational History    Not on file   Tobacco Use    Smoking status: Never Smoker    Smokeless tobacco: Never Used   Substance and  Sexual Activity    Alcohol use: Yes     Comment: socially    Drug use: No    Sexual activity: Yes     Partners: Male     Birth control/protection: Other-see comments, OCP     Comment: -- with vasectomy   Other Topics Concern    Not on file   Social History Narrative    Not on file       Review of Systems -     Respiratory ROS: no cough, shortness of breath, or wheezing  Cardiovascular ROS: no chest pain or dyspnea on exertion  Gastrointestinal ROS: no abdominal pain, change in bowel habits, or black or bloody stools  Musculoskeletal ROS: negative  Neurological ROS: no TIA or stroke symptoms        Physical Exam:  General: no distress  Head: normocephalic  Airway:  normal oropharynx, airway normal  Neck: supple, symmetrical, trachea midline  Lungs:  normal respiratory effort  Heart: regular rate and rhythm  Abdomen: soft, non-tender non-distented; bowel sounds normal; no masses,  no organomegaly  Extremities: no cyanosis or edema, or clubbing       Deep Sedation: Mallampati Score per anesthesia     SedationPlan :Moderate     ASA : II    Patient is medically cleared for anesthesia.    Anesthesia/Surgery risks, benefits and alternative options discussed and understood by patient/family.

## 2019-01-10 NOTE — DISCHARGE SUMMARY
Discharge Note  Short Stay      SUMMARY     Admit Date: 1/10/2019    Attending Physician: Zachery Augustin MD     Discharge Physician: Zachery Augustin MD    Discharge Date: 1/10/2019 11:43 AM    Admission Diagnosis: asymptomatic screening exam    Final Diagnosis:  Rectosigmoid mass    Procedures Performed:    Flexible sigmoidoscopy biopsy cold forceps    Disposition: Home or Self Care    Condition at Discharge:  Stable    Patient Instructions:   Current Discharge Medication List      CONTINUE these medications which have NOT CHANGED    Details   cetirizine (ZYRTEC) 10 MG tablet Take 10 mg by mouth once daily.      LO LOESTRIN FE 1 mg-10 mcg (24)/10 mcg (2) Tab Take 1 tablet by mouth once daily.  Qty: 84 tablet, Refills: 4    Associated Diagnoses: Uterine leiomyoma, unspecified location; Surveillance of contraceptive pill      multivitamin with minerals tablet Take 1 tablet by mouth once daily.         STOP taking these medications       bisacodyl (DULCOLAX) 5 mg EC tablet Comments:   Reason for Stopping:         sodium,potassium,mag sulfates (SUPREP BOWEL PREP KIT) 17.5-3.13-1.6 gram SolR Comments:   Reason for Stopping:               Discharge Procedure Orders (must include Diet, Follow-up, Activity)   Discharge Procedure Orders (must include Diet, Follow-up, Activity)   Activity as tolerated        My office will call to schedule labs, CT scan, and follow up visit to plan further treatment.

## 2019-01-10 NOTE — ANESTHESIA PREPROCEDURE EVALUATION
01/10/2019  Irene Galo is a 53 y.o., female.    Pre-op Assessment    I have reviewed the Patient Summary Reports.      I have reviewed the Medications.     Review of Systems  Anesthesia Hx:  No problems with previous Anesthesia  History of prior surgery of interest to airway management or planning:  Denies Personal Hx of Anesthesia complications.   Social:  Non-Smoker, Social Alcohol Use    Hematology/Oncology:  Hematology Normal   Oncology Normal     EENT/Dental:EENT/Dental Normal   Cardiovascular:  Cardiovascular Normal Exercise tolerance: good     Pulmonary:  Pulmonary Normal    Renal/:  Renal/ Normal     Hepatic/GI:  Hepatic/GI Normal    Musculoskeletal:  Musculoskeletal Normal    Neurological:  Neurology Normal    Endocrine:  Endocrine Normal    Dermatological:  Skin Normal    Psych:  Psychiatric Normal           Physical Exam  General:  Well nourished    Airway/Jaw/Neck:  Airway Findings: Mouth Opening: Normal Tongue: Normal  General Airway Assessment: Adult  Mallampati: I  TM Distance: 4-6 cm      Dental:  Dental Findings: In tact   Chest/Lungs:  Chest/Lungs Findings: Clear to auscultation, Normal Respiratory Rate     Heart/Vascular:  Heart Findings: Rate: Normal  Rhythm: Regular Rhythm     Abdomen:  Abdomen Findings:  Normal       Mental Status:  Mental Status Findings:  Cooperative, Alert and Oriented         Anesthesia Plan  Type of Anesthesia, risks & benefits discussed:  Anesthesia Type:  general  Patient's Preference:   Intra-op Monitoring Plan: standard ASA monitors  Intra-op Monitoring Plan Comments:   Post Op Pain Control Plan:   Post Op Pain Control Plan Comments:   Induction:   IV  Beta Blocker:  Patient is not currently on a Beta-Blocker (No further documentation required).       Informed Consent: Patient understands risks and agrees with Anesthesia plan.  Questions answered.  Anesthesia consent signed with patient.  ASA Score: 1     Day of Surgery Review of History & Physical: I have interviewed and examined the patient. I have reviewed the patient's H&P dated: 1/10/18. There are no significant changes.  H&P update referred to the surgeon.         Ready For Surgery From Anesthesia Perspective.

## 2019-01-10 NOTE — OP NOTE
Patient Name: Irene Galo   Procedure Date: 1/10/2019  MRN: [id number]  : 1965  Age: 53 y.o.  Gender: female   Referring Physician :  Juan Lamb MD  Plan for Procedure: Monitored anaesthesia care  Indication: asymptomatic screening exam  Procedure:   Colonoscopy biopsy cold forceps    Surgeon(s) and Role:     * Zachery Augustin MD - Primary    Complications: None     Medicines: monitored anesthesia care    Procedure:  Prior to the procedure, a History and Physical was performed, and patient medications, allergies and sensitivities were reviewed.  The patient's tolerance of previous anesthesia was reviewed. The risks and benefits of the procedure and the sedation options and risks were discussed with the patient.  All questions were answered and informed consent was obtained.    After I obtained informed consent, the scope was passed under direct vision.  Throughout the procedure, the patient's blood pressure, pulse, and oxygen saturations were monitored continuously.  The Olympus scope CF - [id number] was introduced through the anus and advanced to the rectosigmoid colon.  The colonoscopy was performed without difficulty.  The patient tolerated the procedure well.  The quality of the bowel preparation was good     Findings:  Tumor/mass, circumferential, partially obstructing, not able to be traversed with scope, located in the rectosigmoid - inferior edge at 18-19 cm from anal verge; biopsies were obtained    EBL: none    Impression:  Rectosigmoid mass, partially obstructing, suspicious for invasive carcinoma    Recommendation:  Await biopsies  CT C/A/P for staging  Labs, including CEA

## 2019-01-10 NOTE — TRANSFER OF CARE
Anesthesia Transfer of Care Note    Patient: Irene Galo    Procedure(s) Performed: Procedure(s) (LRB):  COLONOSCOPY (N/A)    Patient location: PACU    Anesthesia Type: general    Transport from OR: Transported from OR on 6-10 L/min O2 by face mask with adequate spontaneous ventilation    Post pain: adequate analgesia    Post assessment: no apparent anesthetic complications and tolerated procedure well    Post vital signs: stable    Level of consciousness: sedated    Nausea/Vomiting: no nausea/vomiting    Complications: none    Transfer of care protocol was followed      Last vitals:   Visit Vitals  /80 (BP Location: Left arm, Patient Position: Lying)   Pulse 64   Temp 36.7 °C (98.1 °F)   Resp 18   SpO2 96%

## 2019-01-10 NOTE — TELEPHONE ENCOUNTER
Called patient to confirm appointments scheduled per Dr. Augustin's request. Lab and CT at [place] 1/15/19. No answer. Left message to please call back. Contact # provided.

## 2019-01-11 ENCOUNTER — TELEPHONE (OUTPATIENT)
Dept: SURGERY | Facility: CLINIC | Age: 54
End: 2019-01-11

## 2019-01-11 VITALS
DIASTOLIC BLOOD PRESSURE: 65 MMHG | TEMPERATURE: 99 F | SYSTOLIC BLOOD PRESSURE: 110 MMHG | OXYGEN SATURATION: 100 % | RESPIRATION RATE: 18 BRPM | HEART RATE: 66 BPM

## 2019-01-11 NOTE — TELEPHONE ENCOUNTER
----- Message from Torres Mayers sent at 1/11/2019 12:47 PM CST -----  Contact: Pt:[phone]  .Patient Returning Call from [facility]    Who Left Message for Patient:Irene PRETTY  Communication Preference:Pt:[phone]  Additional Information:

## 2019-01-15 ENCOUNTER — HOSPITAL ENCOUNTER (OUTPATIENT)
Dept: RADIOLOGY | Facility: HOSPITAL | Age: 54
Discharge: HOME | End: 2019-01-15
Attending: COLON & RECTAL SURGERY
Payer: COMMERCIAL

## 2019-01-15 DIAGNOSIS — D37.5: ICD-10-CM

## 2019-01-15 PROCEDURE — 25500020 PHARM REV CODE 255: Performed by: COLON & RECTAL SURGERY

## 2019-01-15 PROCEDURE — 74177 CT ABD & PELVIS W/CONTRAST: CPT | Mod: TC

## 2019-01-15 PROCEDURE — 71260 CT CHEST ABDOMEN PELVIS WITH CONTRAST (XPD): ICD-10-PCS | Mod: 26,,, | Performed by: RADIOLOGY

## 2019-01-15 PROCEDURE — 74177 CT CHEST ABDOMEN PELVIS WITH CONTRAST (XPD): ICD-10-PCS | Mod: 26,,, | Performed by: RADIOLOGY

## 2019-01-15 PROCEDURE — 71260 CT THORAX DX C+: CPT | Mod: 26,,, | Performed by: RADIOLOGY

## 2019-01-15 PROCEDURE — 74177 CT ABD & PELVIS W/CONTRAST: CPT | Mod: 26,,, | Performed by: RADIOLOGY

## 2019-01-15 RX ADMIN — IOHEXOL 100 ML: 350 INJECTION, SOLUTION INTRAVENOUS at 01:01

## 2019-01-15 RX ADMIN — IOHEXOL 30 ML: 350 INJECTION, SOLUTION INTRAVENOUS at 01:01

## 2019-01-17 ENCOUNTER — HOSPITAL ENCOUNTER (OUTPATIENT)
Dept: PULMONOLOGY | Facility: HOSPITAL | Age: 54
Discharge: HOME | End: 2019-01-17
Attending: COLON & RECTAL SURGERY
Payer: COMMERCIAL

## 2019-01-17 ENCOUNTER — OFFICE VISIT (OUTPATIENT)
Dept: SURGERY | Facility: CLINIC | Age: 54
End: 2019-01-17
Payer: COMMERCIAL

## 2019-01-17 VITALS
BODY MASS INDEX: 33.92 KG/M2 | HEIGHT: 62 IN | WEIGHT: 184.31 LBS | DIASTOLIC BLOOD PRESSURE: 83 MMHG | SYSTOLIC BLOOD PRESSURE: 141 MMHG | HEART RATE: 63 BPM

## 2019-01-17 DIAGNOSIS — D37.5: Primary | ICD-10-CM

## 2019-01-17 DIAGNOSIS — D37.5: ICD-10-CM

## 2019-01-17 PROCEDURE — 99999 PR PBB SHADOW E&M-EST. PATIENT-LVL IV: ICD-10-PCS | Mod: PBBFAC,,, | Performed by: COLON & RECTAL SURGERY

## 2019-01-17 PROCEDURE — 93005 ELECTROCARDIOGRAM TRACING: CPT

## 2019-01-17 PROCEDURE — 3008F BODY MASS INDEX DOCD: CPT | Mod: CPTII,S$GLB,, | Performed by: COLON & RECTAL SURGERY

## 2019-01-17 PROCEDURE — 3008F PR BODY MASS INDEX (BMI) DOCUMENTED: ICD-10-PCS | Mod: CPTII,S$GLB,, | Performed by: COLON & RECTAL SURGERY

## 2019-01-17 PROCEDURE — 99999 PR PBB SHADOW E&M-EST. PATIENT-LVL IV: CPT | Mod: PBBFAC,,, | Performed by: COLON & RECTAL SURGERY

## 2019-01-17 PROCEDURE — 99213 OFFICE O/P EST LOW 20 MIN: CPT | Mod: S$GLB,,, | Performed by: COLON & RECTAL SURGERY

## 2019-01-17 PROCEDURE — 93010 EKG 12-LEAD: ICD-10-PCS | Mod: ,,, | Performed by: INTERNAL MEDICINE

## 2019-01-17 PROCEDURE — 93010 ELECTROCARDIOGRAM REPORT: CPT | Mod: ,,, | Performed by: INTERNAL MEDICINE

## 2019-01-17 PROCEDURE — 99213 PR OFFICE/OUTPT VISIT, EST, LEVL III, 20-29 MIN: ICD-10-PCS | Mod: S$GLB,,, | Performed by: COLON & RECTAL SURGERY

## 2019-01-17 RX ORDER — METRONIDAZOLE 500 MG/1
500 TABLET ORAL SEE ADMIN INSTRUCTIONS
Qty: 3 TABLET | Refills: 0 | Status: SHIPPED | OUTPATIENT
Start: 2019-01-17 | End: 2019-02-28

## 2019-01-17 RX ORDER — NEOMYCIN SULFATE 500 MG/1
1000 TABLET ORAL SEE ADMIN INSTRUCTIONS
Qty: 6 TABLET | Refills: 0 | Status: SHIPPED | OUTPATIENT
Start: 2019-01-17 | End: 2019-02-28

## 2019-01-17 NOTE — PROGRESS NOTES
Subjective:       Patient ID: Irene Galo is a 53 y.o. female.    Chief Complaint: CT Results    HPI 52 yo F, underwent asymptomatic screening colonoscopy by me 1/10/19 - found to have a circumferential, partially obstructing mass in the rectosigmoid, not able to be traversed with scope, inferior edge at 18-19 cm from anal verge.    BIOPISES:  -Fragments of colonic mucosa (submitted as multiple biopsies rectal mass):   -Moderate dysplasia of tubular glands associated with fibrosis, ulceration, and chronic active inflammation   -Due to ulceration and fibrosis, glandular architecture is distorted. Although definitive malignancy is not    identified within biopsy fragments, malignancy cannot be excluded within the lesion from which the   biopsies were obtained. Correlation with endoscopic findings is necessary for full interpretation.    CT C/A/P 1/15/19: (Images personally reviewed.)  -Focal region of thickening of the rectosigmoid colon measuring approximately 1.9 cm in length, which could possibly correspond to the abnormality seen on recent colonoscopy.  There are some adjacent slightly prominent lymph nodes superior to this region of thickening along the course of the inferior mesenteric vessels, measuring approximately 6.5 mm.  Correlation with pathology results is recommended.  -Diverticulosis coli without diverticulitis.  Small hiatal hernia.  -Suspected uterine fibroid.    No abdominal pain, unexplained weight loss, anorexia, recent change in bowel habits, or other constitutional symptoms.   No blood in her stool or rectal bleeding with BM's.  No prior colonoscopy.    No family hx of CRC or IBD.    Review of patient's allergies indicates:   Allergen Reactions    No known drug allergies        Past Medical History:   Diagnosis Date    Bilateral hearing loss     Ear pressure     Pain in ear     Unspecified tinnitus     Uterine fibroid        Past Surgical History:   Procedure Laterality Date     COLONOSCOPY WITH BIOPSY N/A 1/10/2019    Per[facility]y [facility]in [facility]    lasix  2000    both eyes    STAPEDECTOMY  2009    STAPEDECTOMY Right 6/6/2013    Performed by Felix Azevedo MD at [facility] OR [address] [place] [address]       Current Outpatient Medications   Medication Sig Dispense Refill    cetirizine (ZYRTEC) 10 MG tablet Take 10 mg by mouth once daily.      LO LOESTRIN FE 1 mg-10 mcg (24)/10 mcg (2) Tab Take 1 tablet by mouth once daily. 84 tablet 4    multivitamin with minerals tablet Take 1 tablet by mouth once daily.      metroNIDAZOLE (FLAGYL) 500 MG tablet Take 1 tablet (500 mg total) by mouth As instructed. Take 1 tablet at 1pm, 2pm, 11pm the day before surgery 3 tablet 0    neomycin (MYCIFRADIN) 500 mg Tab Take 2 tablets (1,000 mg total) by mouth As instructed. Take 2 tablets at 1pm, 2pm, 11pm the day before surgery 6 tablet 0     No current facility-administered medications for this visit.        Family History   Problem Relation Age of Onset    Cancer Father         Lung    Diverticulitis Father     Breast cancer Paternal Grandmother 70    Colon cancer Neg Hx     Ovarian cancer Neg Hx        Social History     Socioeconomic History    Marital status:      Spouse name: None    Number of children: None    Years of education: None    Highest education level: None   Social Needs    Financial resource strain: None    Food insecurity - worry: None    Food insecurity - inability: None    Transportation needs - medical: None    Transportation needs - non-medical: None   Occupational History    None   Tobacco Use    Smoking status: Never Smoker    Smokeless tobacco: Never Used   Substance and Sexual Activity    Alcohol use: Yes     Comment: socially    Drug use: No    Sexual activity: Yes     Partners: Male     Birth control/protection: Other-see comments, OCP     Comment: -- with vasectomy   Other Topics Concern    None   Social History Narrative    None        Review of Systems   Constitutional: Negative for chills and fever.   HENT: Negative for congestion and sore throat.    Eyes: Negative for visual disturbance.   Respiratory: Negative for cough and shortness of breath.    Cardiovascular: Negative for chest pain and palpitations.   Gastrointestinal: Negative for abdominal distention, abdominal pain, anal bleeding, blood in stool, constipation, diarrhea, nausea, rectal pain and vomiting.   Endocrine: Negative for cold intolerance and heat intolerance.   Genitourinary: Negative for dysuria and frequency.   Musculoskeletal: Negative for arthralgias, back pain and neck pain.   Skin: Negative for rash.   Allergic/Immunologic: Negative for immunocompromised state.   Neurological: Negative for dizziness, light-headedness and headaches.   Hematological: Does not bruise/bleed easily.   Psychiatric/Behavioral: Negative for confusion. The patient is not nervous/anxious.        Objective:      Physical Exam   Constitutional: She is oriented to person, place, and time. She appears well-developed and well-nourished.   HENT:   Head: Normocephalic.   Pulmonary/Chest: Effort normal. No respiratory distress.   Abdominal: Soft. Bowel sounds are normal. She exhibits no distension and no mass. There is no tenderness. There is no rebound and no guarding.   Musculoskeletal: Normal range of motion.   Neurological: She is alert and oriented to person, place, and time.   Skin: Skin is warm and dry.   Psychiatric: She has a normal mood and affect.         Lab Results   Component Value Date    WBC 11.09 01/15/2019    HGB 13.8 01/15/2019    HCT 41.6 01/15/2019    MCV 91 01/15/2019     01/15/2019     BMP  Lab Results   Component Value Date     01/15/2019    K 4.1 01/15/2019     01/15/2019    CO2 25 01/15/2019    BUN 9 01/15/2019    CREATININE 0.8 01/15/2019    CALCIUM 9.4 01/15/2019    ANIONGAP 10 01/15/2019    ESTGFRAFRICA >60 01/15/2019    EGFRNONAA >60 01/15/2019      CMP  Sodium   Date Value Ref Range Status   01/15/2019 140 136 - 145 mmol/L Final     Potassium   Date Value Ref Range Status   01/15/2019 4.1 3.5 - 5.1 mmol/L Final     Chloride   Date Value Ref Range Status   01/15/2019 105 95 - 110 mmol/L Final     CO2   Date Value Ref Range Status   01/15/2019 25 23 - 29 mmol/L Final     Glucose   Date Value Ref Range Status   01/15/2019 87 70 - 110 mg/dL Final     BUN, Bld   Date Value Ref Range Status   01/15/2019 9 6 - 20 mg/dL Final     Creatinine   Date Value Ref Range Status   01/15/2019 0.8 0.5 - 1.4 mg/dL Final     Calcium   Date Value Ref Range Status   01/15/2019 9.4 8.7 - 10.5 mg/dL Final     Total Protein   Date Value Ref Range Status   01/15/2019 7.3 6.0 - 8.4 g/dL Final     Albumin   Date Value Ref Range Status   01/15/2019 3.8 3.5 - 5.2 g/dL Final     Total Bilirubin   Date Value Ref Range Status   01/15/2019 0.6 0.1 - 1.0 mg/dL Final     Comment:     For infants and newborns, interpretation of results should be based  on gestational age, weight and in agreement with clinical  observations.  Premature Infant recommended reference ranges:  Up to 24 hours.............<8.0 mg/dL  Up to 48 hours............<12.0 mg/dL  3-5 days..................<15.0 mg/dL  6-29 days.................<15.0 mg/dL       Alkaline Phosphatase   Date Value Ref Range Status   01/15/2019 54 (L) 55 - 135 U/L Final     AST   Date Value Ref Range Status   01/15/2019 13 10 - 40 U/L Final     ALT   Date Value Ref Range Status   01/15/2019 10 10 - 44 U/L Final     Anion Gap   Date Value Ref Range Status   01/15/2019 10 8 - 16 mmol/L Final     eGFR if    Date Value Ref Range Status   01/15/2019 >60 >60 mL/min/1.73 m^2 Final     eGFR if non    Date Value Ref Range Status   01/15/2019 >60 >60 mL/min/1.73 m^2 Final     Comment:     Calculation used to obtain the estimated glomerular filtration  rate (eGFR) is the CKD-EPI equation.        Lab Results   Component Value  Date    CEA 3.3 01/15/2019           Assessment:       1. 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  I explained possibility of sampling error and that this may still be an invasive cancer.    Scheduled for robotic-assisted rectosigmoid resection 2/15/19.    I have discussed the procedure at length with Irene Galo.  We discussed the rationale, risks, benefits, and alternatives in depth.  We discussed the expected outcomes and potential complications including but not limited to need for temporary stoma, risk of needing an open procedure, bleeding, infection, anastomotic leak, recurrence, prolonged pain, need for further procedures, altered continence, incisional hernia, post-operative sexual dysfunction, post-operative bladder dysfunction and injury to adjacent organs.  She verbalized her understanding of the procedure and wishes to proceed.  Written consent was obtained.      Zachery Augustin MD, FACS, FASCRS  Senior Staff Surgeon  Department of Colon & Rectal Surgery

## 2019-01-17 NOTE — LETTER
January 31, 2019      Juan Lamb MD  [id number] [place] [address]-Colon/Rectal Surgery  [address] [facility] [address]  Phone: [phone]  Fax: [phone]          Patient: Irene Galo   MR Number: [id number]   YOB: 1965   Date of Visit: 1/17/2019       Dear Dr. Lamb:    Thank you for referring Irene Galo to me for evaluation. Attached you will find relevant portions of my assessment and plan of care.    If you have questions, please do not hesitate to call me. I look forward to following Irene Galo along with you.    Sincerely,    Zachery Augustin MD    Enclosure  CC:  No Recipients    If you would like to receive this communication electronically, please contact [email or web address] or [phone] to request more information on [employer] Link access.    For providers and/or their staff who would like to refer a patient to [facility], please contact us through our one-stop-shop provider referral line, [facility], at [phone].    If you feel you have received this communication in error or would no longer like to receive these types of communications, please e-mail [email or web address]

## 2019-01-17 NOTE — H&P (VIEW-ONLY)
Subjective:       Patient ID: Irene Galo is a 53 y.o. female.    Chief Complaint: CT Results    HPI 52 yo F, underwent asymptomatic screening colonoscopy by me 1/10/19 - found to have a circumferential, partially obstructing mass in the rectosigmoid, not able to be traversed with scope, inferior edge at 18-19 cm from anal verge.    BIOPISES:  -Fragments of colonic mucosa (submitted as multiple biopsies rectal mass):   -Moderate dysplasia of tubular glands associated with fibrosis, ulceration, and chronic active inflammation   -Due to ulceration and fibrosis, glandular architecture is distorted. Although definitive malignancy is not    identified within biopsy fragments, malignancy cannot be excluded within the lesion from which the   biopsies were obtained. Correlation with endoscopic findings is necessary for full interpretation.    CT C/A/P 1/15/19: (Images personally reviewed.)  -Focal region of thickening of the rectosigmoid colon measuring approximately 1.9 cm in length, which could possibly correspond to the abnormality seen on recent colonoscopy.  There are some adjacent slightly prominent lymph nodes superior to this region of thickening along the course of the inferior mesenteric vessels, measuring approximately 6.5 mm.  Correlation with pathology results is recommended.  -Diverticulosis coli without diverticulitis.  Small hiatal hernia.  -Suspected uterine fibroid.    No abdominal pain, unexplained weight loss, anorexia, recent change in bowel habits, or other constitutional symptoms.   No blood in her stool or rectal bleeding with BM's.  No prior colonoscopy.    No family hx of CRC or IBD.    Review of patient's allergies indicates:   Allergen Reactions    No known drug allergies        Past Medical History:   Diagnosis Date    Bilateral hearing loss     Ear pressure     Pain in ear     Unspecified tinnitus     Uterine fibroid        Past Surgical History:   Procedure Laterality Date     COLONOSCOPY WITH BIOPSY N/A 1/10/2019    Performed by Zachery Augustin MD at [facility] ENDO    lasix  2000    both eyes    STAPEDECTOMY  2009    STAPEDECTOMY Right 6/6/2013    Performed by Felix Azevedo MD at [facility] OR [address] [place] [address]       Current Outpatient Medications   Medication Sig Dispense Refill    cetirizine (ZYRTEC) 10 MG tablet Take 10 mg by mouth once daily.      LO LOESTRIN FE 1 mg-10 mcg (24)/10 mcg (2) Tab Take 1 tablet by mouth once daily. 84 tablet 4    multivitamin with minerals tablet Take 1 tablet by mouth once daily.      metroNIDAZOLE (FLAGYL) 500 MG tablet Take 1 tablet (500 mg total) by mouth As instructed. Take 1 tablet at 1pm, 2pm, 11pm the day before surgery 3 tablet 0    neomycin (MYCIFRADIN) 500 mg Tab Take 2 tablets (1,000 mg total) by mouth As instructed. Take 2 tablets at 1pm, 2pm, 11pm the day before surgery 6 tablet 0     No current facility-administered medications for this visit.        Family History   Problem Relation Age of Onset    Cancer Father         Lung    Diverticulitis Father     Breast cancer Paternal Grandmother 70    Colon cancer Neg Hx     Ovarian cancer Neg Hx        Social History     Socioeconomic History    Marital status:      Spouse name: None    Number of children: None    Years of education: None    Highest education level: None   Social Needs    Financial resource strain: None    Food insecurity - worry: None    Food insecurity - inability: None    Transportation needs - medical: None    Transportation needs - non-medical: None   Occupational History    None   Tobacco Use    Smoking status: Never Smoker    Smokeless tobacco: Never Used   Substance and Sexual Activity    Alcohol use: Yes     Comment: socially    Drug use: No    Sexual activity: Yes     Partners: Male     Birth control/protection: Other-see comments, OCP     Comment: -- with vasectomy   Other Topics Concern    None   Social History Narrative    None        Review of Systems   Constitutional: Negative for chills and fever.   HENT: Negative for congestion and sore throat.    Eyes: Negative for visual disturbance.   Respiratory: Negative for cough and shortness of breath.    Cardiovascular: Negative for chest pain and palpitations.   Gastrointestinal: Negative for abdominal distention, abdominal pain, anal bleeding, blood in stool, constipation, diarrhea, nausea, rectal pain and vomiting.   Endocrine: Negative for cold intolerance and heat intolerance.   Genitourinary: Negative for dysuria and frequency.   Musculoskeletal: Negative for arthralgias, back pain and neck pain.   Skin: Negative for rash.   Allergic/Immunologic: Negative for immunocompromised state.   Neurological: Negative for dizziness, light-headedness and headaches.   Hematological: Does not bruise/bleed easily.   Psychiatric/Behavioral: Negative for confusion. The patient is not nervous/anxious.        Objective:      Physical Exam   Constitutional: She is oriented to person, place, and time. She appears well-developed and well-nourished.   HENT:   Head: Normocephalic.   Pulmonary/Chest: Effort normal. No respiratory distress.   Abdominal: Soft. Bowel sounds are normal. She exhibits no distension and no mass. There is no tenderness. There is no rebound and no guarding.   Musculoskeletal: Normal range of motion.   Neurological: She is alert and oriented to person, place, and time.   Skin: Skin is warm and dry.   Psychiatric: She has a normal mood and affect.         Lab Results   Component Value Date    WBC 11.09 01/15/2019    HGB 13.8 01/15/2019    HCT 41.6 01/15/2019    MCV 91 01/15/2019     01/15/2019     BMP  Lab Results   Component Value Date     01/15/2019    K 4.1 01/15/2019     01/15/2019    CO2 25 01/15/2019    BUN 9 01/15/2019    CREATININE 0.8 01/15/2019    CALCIUM 9.4 01/15/2019    ANIONGAP 10 01/15/2019    ESTGFRAFRICA >60 01/15/2019    EGFRNONAA >60 01/15/2019      CMP  Sodium   Date Value Ref Range Status   01/15/2019 140 136 - 145 mmol/L Final     Potassium   Date Value Ref Range Status   01/15/2019 4.1 3.5 - 5.1 mmol/L Final     Chloride   Date Value Ref Range Status   01/15/2019 105 95 - 110 mmol/L Final     CO2   Date Value Ref Range Status   01/15/2019 25 23 - 29 mmol/L Final     Glucose   Date Value Ref Range Status   01/15/2019 87 70 - 110 mg/dL Final     BUN, Bld   Date Value Ref Range Status   01/15/2019 9 6 - 20 mg/dL Final     Creatinine   Date Value Ref Range Status   01/15/2019 0.8 0.5 - 1.4 mg/dL Final     Calcium   Date Value Ref Range Status   01/15/2019 9.4 8.7 - 10.5 mg/dL Final     Total Protein   Date Value Ref Range Status   01/15/2019 7.3 6.0 - 8.4 g/dL Final     Albumin   Date Value Ref Range Status   01/15/2019 3.8 3.5 - 5.2 g/dL Final     Total Bilirubin   Date Value Ref Range Status   01/15/2019 0.6 0.1 - 1.0 mg/dL Final     Comment:     For infants and newborns, interpretation of results should be based  on gestational age, weight and in agreement with clinical  observations.  Premature Infant recommended reference ranges:  Up to 24 hours.............<8.0 mg/dL  Up to 48 hours............<12.0 mg/dL  3-5 days..................<15.0 mg/dL  6-29 days.................<15.0 mg/dL       Alkaline Phosphatase   Date Value Ref Range Status   01/15/2019 54 (L) 55 - 135 U/L Final     AST   Date Value Ref Range Status   01/15/2019 13 10 - 40 U/L Final     ALT   Date Value Ref Range Status   01/15/2019 10 10 - 44 U/L Final     Anion Gap   Date Value Ref Range Status   01/15/2019 10 8 - 16 mmol/L Final     eGFR if    Date Value Ref Range Status   01/15/2019 >60 >60 mL/min/1.73 m^2 Final     eGFR if non    Date Value Ref Range Status   01/15/2019 >60 >60 mL/min/1.73 m^2 Final     Comment:     Calculation used to obtain the estimated glomerular filtration  rate (eGFR) is the CKD-EPI equation.        Lab Results   Component Value  Date    CEA 3.3 01/15/2019           Assessment:       1. 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  I explained possibility of sampling error and that this may still be an invasive cancer.    Scheduled for robotic-assisted rectosigmoid resection 2/15/19.    I have discussed the procedure at length with Irene Galo.  We discussed the rationale, risks, benefits, and alternatives in depth.  We discussed the expected outcomes and potential complications including but not limited to need for temporary stoma, risk of needing an open procedure, bleeding, infection, anastomotic leak, recurrence, prolonged pain, need for further procedures, altered continence, incisional hernia, post-operative sexual dysfunction, post-operative bladder dysfunction and injury to adjacent organs.  She verbalized her understanding of the procedure and wishes to proceed.  Written consent was obtained.      Zachery Augustin MD, FACS, FASCRS  Senior Staff Surgeon  Department of Colon & Rectal Surgery

## 2019-01-18 ENCOUNTER — ANESTHESIA EVENT (OUTPATIENT)
Dept: SURGERY | Facility: HOSPITAL | Age: 54
DRG: 330 | End: 2019-01-18
Payer: COMMERCIAL

## 2019-01-18 ENCOUNTER — TELEPHONE (OUTPATIENT)
Dept: PREADMISSION TESTING | Facility: HOSPITAL | Age: 54
End: 2019-01-18

## 2019-01-18 DIAGNOSIS — Z01.818 PREOP TESTING: Primary | ICD-10-CM

## 2019-01-18 NOTE — ANESTHESIA PREPROCEDURE EVALUATION
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     Last PCP note: > 1 year ago , within [facility] , focused visit   Subspecialty notes: OB/GYN/Otolaryngology  Tests already available:  Results have been reviewed.Labs-1/15/19-CMP/CBC/CEA/ 1/10/19-Urine Pregnancy ,rapid/EKG-1/17/19      Plan:    Testing:  T&S        Patient  has previously scheduled Medical Appointment:None    Navigation: Tests Scheduled. Lab-T&S on 2/5/19 @ 12:30p             Consults scheduled. POC & Perioperative IM Consult on 2/5/19 @ 10a & 11a             Results will be tracked by Preop Clinic.                Alaina Olsen RN 1/18/19 01/18/2019  Irene Ruggierobreannatee is a 53 y.o., female.    Anesthesia Evaluation    I have reviewed the Patient Summary Reports.     I have reviewed the Medications.     Review of Systems  Anesthesia Hx:  No problems with previous Anesthesia  History of prior surgery of interest to airway management or planning: Previous anesthesia: General colonoscopy 1/10/19 with general anesthesia.  Procedure performed at an [facility] Facility. Denies Family Hx of Anesthesia complications.   Denies Personal Hx of Anesthesia complications.   Social:  Non-Smoker, Social Alcohol Use     Hematology/Oncology:  Hematology Normal   Oncology Normal     EENT/Dental:   S/p stapedectomy 2013   Cardiovascular:   Denies Hypertension.   Functional Capacity good / => 4 METS, works as teacher, climb stairs at work, housework, hiking; denies CP, SOB    Pulmonary:   Denies Asthma.  Denies Sleep Apnea.    Renal/:  Renal/ Normal     Hepatic/GI:   GERD (prn pepcid)    Musculoskeletal:  Musculoskeletal Normal    OB/GYN/PEDS:  LMP: 11/2018   Neurological:  Neurology Normal  Denies Pain    Endocrine:   Denies Diabetes.    Psych:  Psychiatric Normal           Physical Exam  General:  Well nourished    Airway/Jaw/Neck:  Airway Findings: Mouth Opening: Normal Tongue: Normal  General Airway Assessment: Adult  Improves to II with phonation.  TM Distance: 4 - 6 cm  Jaw/Neck Findings:     Neck ROM: Normal ROM      Dental:  Dental Findings: molar caps, In tact        Mental Status:  Mental Status Findings:  Cooperative, Alert and Oriented       Pt was seen in POC 2/5/19; Medical optimization: please see EPIC notes for recommendations of pre-op medical consultant, Dr Dias, for perioperative medical management. /Lynda Mcmahan RN           Anesthesia Plan  Type of Anesthesia, risks & benefits discussed:  Anesthesia Type:  general  Patient's Preference:   Intra-op Monitoring Plan: standard ASA monitors  Intra-op Monitoring Plan Comments:   Post Op Pain Control Plan: per primary service following discharge from PACU and multimodal analgesia  Post Op Pain Control Plan Comments:   Induction:   IV  Beta Blocker:  Patient is not currently on a Beta-Blocker (No further documentation required).       Informed Consent: Patient understands risks and agrees with Anesthesia plan.  Questions answered. Anesthesia consent signed with patient.  ASA Score: 1     Day of Surgery Review of History & Physical:    H&P update referred to the surgeon.         Ready For Surgery From Anesthesia Perspective.

## 2019-01-18 NOTE — PRE ADMISSION SCREENING
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     Last PCP note: > 1 year ago , within [facility] , focused visit   Subspecialty notes: OB/GYN/Otolaryngology  Tests already available:  Results have been reviewed.Labs-1/15/19-CMP/CBC/CEA/ 1/10/19-Urine Pregnancy ,rapid/EKG-1/17/19      Plan:    Testing:  T&S        Patient  has previously scheduled Medical Appointment:None    Navigation: Tests Scheduled. Lab-T&S on ?             Consults scheduled. POC & Perioperative IM Consult on ?             Results will be tracked by Preop Clinic.                Alaina Olsen RN 1/18/19

## 2019-01-27 ENCOUNTER — PATIENT MESSAGE (OUTPATIENT)
Dept: ADMINISTRATIVE | Facility: OTHER | Age: 54
End: 2019-01-27

## 2019-02-05 ENCOUNTER — HOSPITAL ENCOUNTER (OUTPATIENT)
Dept: PREADMISSION TESTING | Facility: HOSPITAL | Age: 54
Discharge: HOME | End: 2019-02-05
Attending: ANESTHESIOLOGY
Payer: COMMERCIAL

## 2019-02-05 ENCOUNTER — INITIAL CONSULT (OUTPATIENT)
Dept: INTERNAL MEDICINE | Facility: CLINIC | Age: 54
End: 2019-02-05
Payer: COMMERCIAL

## 2019-02-05 VITALS
OXYGEN SATURATION: 98 % | SYSTOLIC BLOOD PRESSURE: 125 MMHG | HEIGHT: 62 IN | WEIGHT: 185.13 LBS | BODY MASS INDEX: 34.07 KG/M2 | DIASTOLIC BLOOD PRESSURE: 76 MMHG | HEART RATE: 65 BPM | TEMPERATURE: 98 F

## 2019-02-05 DIAGNOSIS — D21.9 FIBROIDS: ICD-10-CM

## 2019-02-05 DIAGNOSIS — R12 HEART BURN: ICD-10-CM

## 2019-02-05 DIAGNOSIS — D37.5: ICD-10-CM

## 2019-02-05 PROCEDURE — 99999 PR PBB SHADOW E&M-EST. PATIENT-LVL III: ICD-10-PCS | Mod: PBBFAC,,, | Performed by: HOSPITALIST

## 2019-02-05 PROCEDURE — 99244 OFF/OP CNSLTJ NEW/EST MOD 40: CPT | Mod: S$GLB,,, | Performed by: HOSPITALIST

## 2019-02-05 PROCEDURE — 99999 PR PBB SHADOW E&M-EST. PATIENT-LVL III: CPT | Mod: PBBFAC,,, | Performed by: HOSPITALIST

## 2019-02-05 PROCEDURE — 99244 PR OFFICE CONSULTATION,LEVEL IV: ICD-10-PCS | Mod: S$GLB,,, | Performed by: HOSPITALIST

## 2019-02-05 RX ORDER — FAMOTIDINE 20 MG/1
20 TABLET, FILM COATED ORAL DAILY PRN
COMMUNITY
End: 2019-10-28 | Stop reason: ALTCHOICE

## 2019-02-05 NOTE — LETTER
February 5, 2019      Krystle Stewart MD  [address] [facility] [address]           [facility] - Pre Op Consult  [address] [facility] [address]  Phone: [phone]          Patient: Irene Galo   MR Number: [id number]   YOB: 1965   Date of Visit: 2/5/2019       Dear Dr. Krystle Stewart:    Thank you for referring Ireen Galo to me for evaluation. Attached you will find relevant portions of my assessment and plan of care.    If you have questions, please do not hesitate to call me. I look forward to following Irene Galo along with you.    Sincerely,    Wen Dias MD    Enclosure  CC:  MD Zachery Pradhan MD    If you would like to receive this communication electronically, please contact [email or web address] or [phone] to request more information on [facility] [employer] access.    For providers and/or their staff who would like to refer a patient to [facility], please contact us through our one-stop-shop provider referral line, [facility], at [phone].    If you feel you have received this communication in error or would no longer like to receive these types of communications, please e-mail [email or web address]

## 2019-02-05 NOTE — PATIENT INSTRUCTIONS
Tips to Control Acid Reflux    To control acid reflux, youll need to make some basic diet and lifestyle changes. The simple steps outlined below may be all youll need to ease discomfort.  Watch what you eat  · Avoid fatty foods and spicy foods.  · Eat fewer acidic foods, such as citrus and tomato-based foods. These can increase symptoms.  · Limit drinking alcohol, caffeine, and fizzy beverages. All increase acid reflux.  · Try limiting chocolate, peppermint, and spearmint. These can worsen acid reflux in some people.  Watch when you eat  · Avoid lying down for 3 hours after eating.  · Do not snack before going to bed.  Raise your head  Raising your head and upper body by 4 to 6 inches helps limit reflux when youre lying down. Put blocks under the head of your bed frame to raise it.  Other changes  · Lose weight, if you need to  · Dont exercise near bedtime  · Avoid tight-fitting clothes  · Limit aspirin and ibuprofen  · Stop smoking   Date Last Reviewed: 7/1/2016  © 0338-6925 [employer]. [address] [place] [address], [place] [address]. All rights reserved. This information is not intended as a substitute for professional medical care. Always follow your healthcare professional's instructions.

## 2019-02-05 NOTE — ASSESSMENT & PLAN NOTE
With certain foods ( chocolate , spicy food ) feels central burning , particularly on laying down   Propping with 2 pillows help  No radiation   At rest   Has occasional coughing with acid reflux   CT showed Small hiatal hernia  Relieved by Famotidine   No associated Sweating  , SOB, nausea , vomiting , diarrhea   No suggestion of a cardiac mimic     Discussed , tips to control acid reflux    Not known to  have sleep apnea , diabetes    reports some snoring , but no apnea  Encouraged weight loss

## 2019-02-05 NOTE — OUTPATIENT SUBJECTIVE & OBJECTIVE
"Outpatient Subjective & Objective     Chief complaint-Preoperative evaluation, Perioperative Medical management, complication reduction plan     Active cardiac conditions- none    Revised cardiac risk index predictors- none    Functional capacity -Examples of physical activity  house work, can take 1 flight of stairs, weeding garden and dancing, hiking ----- She can undertake all the above activities without  chest pain,chest tightness, Shortness of breath ,dizziness,lightheadedness making her exercise tolerance more, than 4 Mets.       Review of Systems   Constitutional: Negative for chills and fever.        No unusual weight changes     HENT:        KESHIAG score  1/ 8        Age over 50        Eyes:        No new visual changes   Respiratory:        No cough , phlegm    No Hemoptysis   Cardiovascular:        As noted   Gastrointestinal:        No overt upper GI/ blood losses  LMP- Nov 2018 - Periods irregular   had vasectomy    Endocrine:        Prednisone use > 20 mg daily for 3 weeks(- none    Genitourinary: Negative for dysuria.        No urinary hesitancy    Musculoskeletal:        Left knee on occasions   Fell while in [place] - fell , hiking   Suggested follow up   Skin: Negative for rash.   Neurological: Negative for syncope.        No unilateral weakness   Hematological:        Current use of Anticoagulants  Current use of Antiplatelet agents  None   Psychiatric/Behavioral:        No Depression,Anxiety       No vascular stenting           No anesthesia, bleeding, cardiac problems, PONV with previous surgeries/procedures.  Medications and Allergies reviewed in epic.   FH- No anesthesia,bleeding / venous thrombosis ,  in family   Lives with  and 23 year old son     Physical Exam  Blood pressure 125/76, pulse 65, temperature 98.3 °F (36.8 °C), height 5' 2" (1.575 m), weight 84 kg (185 lb 1.6 oz), SpO2 98 %.    Physical Exam  Constitutional- Vitals - Body mass index is 33.86 kg/m².,   Vitals:    " 02/05/19 0959   BP: 125/76   Pulse: 65   Temp: 98.3 °F (36.8 °C)     General appearance-Conscious,Coherent  Eyes- No conjunctival icterus,pupils  round  and reactive to light   ENT-Oral cavity- moist  , Hearing grossly normal   Neck- No thyromegaly ,Trachea -central, No jugular venous distension,   No Carotid Bruit   Cardiovascular -Heart Sounds- Normal  and  no murmur   , No gallop rhythm   Respiratory - Normal Respiratory Effort, Normal breath sounds,  no wheeze  and  no forced expiratory wheeze    Peripheral pitting pedal edema-- none , no calf pain   Gastrointestinal -Soft abdomen, No palpable masses, Non Tender,Liver,Spleen not palpable. No-- free fluid and shifting dullness  Musculoskeletal- No finger Clubbing. Strength grossly normal   Lymphatic-No Palpable cervical, axillary,Inguinal lymphadenopathy   Psychiatric - normal effect,Orientation  Rt Dorsalis pedis pulses-palpable    Lt Dorsalis pedis pulses- palpable   Rt Posterior tibial pulses -palpable   Left posterior tibial pulses -palpable   Miscellaneous -  no asterixis and  no renal bruit    Investigations  Lab and Imaging have been reviewed in epic.    Review of Medicine tests    EKG- I had independently reviewed the EKG from--1/17/2019   It was reported to be showing     Normal sinus rhythm   Baseline Artifact  Normal ECG  No previous ECGs available    Review of clinical lab tests:  Lab Results   Component Value Date    CREATININE 0.8 01/15/2019    HGB 13.8 01/15/2019     01/15/2019         Review of old records- Was done and information gathered regards to events leading to surgery and health conditions of significance in the perioperative period.    Outpatient Subjective & Objective

## 2019-02-05 NOTE — HPI
History of present illness- I had the pleasure of meeting this pleasant 53 y.o. lady in the pre op clinic prior to her elective Abdominal surgery. The patient is new to me . Irene was accompanied by  Jose Roberto .    I have obtained the history by speaking to the patient and by reviewing the electronic health records.    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 She attributed to Hemorrhoiuds      No aggravating factors. 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 Takes 1 flight of stairs multiple times a day at the school   Been [place] , did 7, 5 mile hiking     Not known to have heart disease , Diabetes Mellitus, Lung disease

## 2019-02-05 NOTE — PROGRESS NOTES
Jaden Hargrove - Pre Op Consult  Progress Note    Patient Name: Irene Galo  MRN: [id number]  Date of Evaluation- 02/05/2019  PCP- Juan Lamb MD    Future cases for Irene Galo [[id number]]     Case ID Status Date Time Juan Procedure Provider Location    [id number] [facility] 2/15/2019  7:00  XI ROBOTIC COLECTOMY, SIGMOID WITH LOW ANTERIOR RESECTION OF RECTUM Zachery Augustin MD [[id number]] NOM OR 2ND FLR          HPI:  History of present illness- I had the pleasure of meeting this pleasant 53 y.o. lady in the pre op clinic prior to her elective Abdominal surgery. The patient is new to me . Irene was accompanied by  Jose Roberto .    I have obtained the history by speaking to the patient and by reviewing the electronic health records.    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 She attributed to Hemorrhoiuds      No aggravating factors. 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 Takes 1 flight of stairs multiple times a day at the school   Been to [place] recently , did 7, 5 mile hiking     Not known to have heart  "disease , Diabetes Mellitus, Lung disease           Subjective/ Objective:          Chief complaint-Preoperative evaluation, Perioperative Medical management, complication reduction plan     Active cardiac conditions- none    Revised cardiac risk index predictors- none    Functional capacity -Examples of physical activity  house work, can take 1 flight of stairs, weeding garden and dancing, hiking ----- She can undertake all the above activities without  chest pain,chest tightness, Shortness of breath ,dizziness,lightheadedness making her exercise tolerance more, than 4 Mets.       Review of Systems   Constitutional: Negative for chills and fever.        No unusual weight changes     HENT:        STOPBANG score  1/ 8        Age over 50        Eyes:        No new visual changes   Respiratory:        No cough , phlegm    No Hemoptysis   Cardiovascular:        As noted   Gastrointestinal:        No overt upper GI/ blood losses  LMP- Nov 2018 - Periods irregular   had vasectomy    Endocrine:        Prednisone use > 20 mg daily for 3 weeks(- none    Genitourinary: Negative for dysuria.        No urinary hesitancy    Musculoskeletal:        Left knee on occasions   Fell while in [place] - fell , hiking   Suggested follow up   Skin: Negative for rash.   Neurological: Negative for syncope.        No unilateral weakness   Hematological:        Current use of Anticoagulants  Current use of Antiplatelet agents  None   Psychiatric/Behavioral:        No Depression,Anxiety       No vascular stenting           No anesthesia, bleeding, cardiac problems, PONV with previous surgeries/procedures.  Medications and Allergies reviewed in epic.   FH- No anesthesia,bleeding / venous thrombosis ,  in family   Lives with  and 23 year old son     Physical Exam  Blood pressure 125/76, pulse 65, temperature 98.3 °F (36.8 °C), height 5' 2" (1.575 m), weight 84 kg (185 lb 1.6 oz), SpO2 98 %.    Physical Exam  Constitutional- Vitals - " Body mass index is 33.86 kg/m².,   Vitals:    02/05/19 0959   BP: 125/76   Pulse: 65   Temp: 98.3 °F (36.8 °C)     General appearance-Conscious,Coherent  Eyes- No conjunctival icterus,pupils  round  and reactive to light   ENT-Oral cavity- moist  , Hearing grossly normal   Neck- No thyromegaly ,Trachea -central, No jugular venous distension,   No Carotid Bruit   Cardiovascular -Heart Sounds- Normal  and  no murmur   , No gallop rhythm   Respiratory - Normal Respiratory Effort, Normal breath sounds,  no wheeze  and  no forced expiratory wheeze    Peripheral pitting pedal edema-- none , no calf pain   Gastrointestinal -Soft abdomen, No palpable masses, Non Tender,Liver,Spleen not palpable. No-- free fluid and shifting dullness  Musculoskeletal- No finger Clubbing. Strength grossly normal   Lymphatic-No Palpable cervical, axillary,Inguinal lymphadenopathy   Psychiatric - normal effect,Orientation  Rt Dorsalis pedis pulses-palpable    Lt Dorsalis pedis pulses- palpable   Rt Posterior tibial pulses -palpable   Left posterior tibial pulses -palpable   Miscellaneous -  no asterixis and  no renal bruit    Investigations  Lab and Imaging have been reviewed in epic.    Review of Medicine tests    EKG- I had independently reviewed the EKG from--1/17/2019   It was reported to be showing     Normal sinus rhythm   Baseline Artifact  Normal ECG  No previous ECGs available    Review of clinical lab tests:  Lab Results   Component Value Date    CREATININE 0.8 01/15/2019    HGB 13.8 01/15/2019     01/15/2019         Review of old records- Was done and information gathered regards to events leading to surgery and health conditions of significance in the perioperative period.        Preoperative cardiac risk assessment-  The patient does not have any active cardiac conditions . Revised cardiac risk index predictors- 0---.Functional capacity is more than 4 Mets. She will be undergoing a Abdominal procedure that carries a  intermediate risk     The estimated risk of the rate of adverse cardiac outcomes  0.4%    No further cardiac work up is indicated prior to proceeding with the surgery          American Society of Anesthesiologists Physical status classification ( ASA ) class: 3     Postoperative pulmonary complication risk assessment:      ARISCAT ( Canet) risk index- risk class -  Low, if duration of surgery is under 3 hours, intermediate, if duration of surgery is over 3 hours      Felipe Respiratory failure index- percentage risk of respiratory failure: 0.5 %     Assessment/Plan:     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  For surgery       Fibroids  On birth control that is helping heavier periods   Without Birth control ( tried without in summer tome 2018 ) , noticed heavier bleeding   Under Gynecology care      On birth control pill  Risk / benefits of birth control pill use in the perioperative period discussed   Alternative contraceptive methods discussed   Risk of thrombosis discussed with birth control pill use  Preferable to hold birth control 4 weeks pre op   She choose to continue alisha op ( given the heavy periods without birth control, given laparoscopic surgery and faster recovery ) , it is perhaps reasonable     Heart burn  With certain foods ( chocolate , spicy food ) feels central burning , particularly on laying down   Propping with 2 pillows help  No radiation   At rest   Has occasional coughing with acid reflux   CT showed Small hiatal hernia  Relieved by Famotidine   No associated Sweating  , SOB, nausea , vomiting , diarrhea   No suggestion of a cardiac mimic     Discussed , tips to control acid reflux    Not known to  have sleep apnea , diabetes    reports some snoring , but no apnea  Encouraged weight loss         Preventive perioperative care    Thromboembolic prophylaxis:  Her risk factors for thrombosis include Birth control,  obesity, surgical procedure and age.I suggest   thromboembolic prophylaxis ( mechanical/pharmacological, weighing the risk benefits of pharmacological agent use considering alisha procedural bleeding )  during the perioperative period.I suggested being active in the post operative period.      Postoperative pulmonary complication prophylaxis-Risk factors for post operative pulmonary complications include surgery lasting over 3 hours, ASA class >2 and proximity of the surgical site to the lungs- I suggest incentive spirometry use, early ambulation, end tidal carbon dioxide monitoring and pain control so as to avoid diaphragmatic splinting  , oral care , head end of bed elevation      Renal complication prophylaxis- I suggest keeping her well hydrated .I suggested drinking 2 litre's of water a day      Surgical site Infection Prophylaxis-I  suggest appropriate antibiotic for Prophylaxis against Surgical site infections       In view of  Rectal procedure  the patient  is at risk of postoperative urinary retention.  I suggest avoidance / minimizing the of  Benzodiazepines,Anticholinergic medication,antihistamines ( Benadryl) , if possible in the perioperative period. I suggest using the minimum possible use of opioids for the minimum period of time in the perioperative period. Benadryl avoidance suggested      This visit was focused on Preoperative evaluation, Perioperative Medical management, complication reduction plans. I suggest that the patient follows up with primary care or relevant sub specialists for ongoing health care.    I appreciate the opportunity to be involved in this patients care. Please feel free to contact me if there were any questions about this consultation.    Patient is optimized     Patient was instructed to call and update me about any changes to health,  medication, office visits ,testing out side of the alisha operative care center , hospitalizations between now and surgery     Wen Dias MD  Perioperative Medicine  [facility]   Pager [phone]  ---  2/14- 10 21    Called to follow up , spoke to her,  to address any concerns with the up coming surgery or any questions on Medication instructions -  Doing Fine  ,No changes to Medication, Health -

## 2019-02-05 NOTE — ASSESSMENT & PLAN NOTE
On birth control that is helping heavier periods   Without Birth control ( tried without in summer tome 2018 ) , noticed heavier bleeding   Under Gynecology care      On birth control pill  Risk / benefits of birth control pill use in the perioperative period discussed   Alternative contraceptive methods discussed   Risk of thrombosis discussed with birth control pill use  Preferable to hold birth control 4 weeks pre op   She choose to continue alisha op ( given the heavy periods without birth control, given laparoscopic surgery and faster recovery ) , it is perhaps reasonable

## 2019-02-05 NOTE — DISCHARGE INSTRUCTIONS
Your surgery has been scheduled for:__________________________________________    You should report to:  ____[facility], located on the [place] side of the first floor of the           [facility] ([phone])  ____[facility], located on the [facility] side of the            Second floor of the [facility] ([phone])  ____3rd Floor [facility] located on the [facility] side of the [facility] [phone]  Please Note   - Tell your doctor if you take Aspirin, products containing Aspirin, herbal medications  or blood thinners, such as Coumadin, Ticlid, or Plavix.  (Consult your provider regarding holding or stopping before surgery).  - Arrange for someone to drive you home following surgery.  You will not be allowed to leave the surgical facility alone or drive yourself home following sedation and anesthesia.  Before Surgery  - Stop taking all herbal medications 14days prior to surgery  - No Motrin/Advil (Ibuprofen) 7 days before surgery  - No Aleve (Naproxen) 7 days before surgery  - Stop Taking Asprin, products containing Asprin _____days before surgery  - Stop taking blood thinners_______days before surgery  - No Goody's/BC  Powder 7 days before surgery  - Refrain from drinking alcoholic beverages for 24hours before and after surgery  - Stop or limit smoking _________days before surgery  - You may take Tylenol for pain    Night before Surgery  NOTHING TO EAT OR DRINK AFTER MIDNIGHT OR FOLLOW SURGEON'S INSTRUCTIONS  - Take a shower or bath (shower is recommended).  Bathe with Hibiclens soap or an antibacterial soap from the neck down.  If not supplied by your surgeon, hibiclens soap will need to be purchased over the counter in pharmacy.  Rinse soap off thoroughly.  - Shampoo your hair with your regular shampoo  The Day of Surgery  ·  If you are told to take medication on the morning of surgery, it may be taken with  a sip of water.   - Take another bath or shower with hibiclens or any antibacterial soap, to reduce the chance of infection.  - Take heart and blood pressure medications with a small sip of water, as advised by the perioperative team.  - Do not take fluid pills  - You may brush your teeth and rinse your mouth, but do not swall any additional water.   - Do not apply perfumes, powder, body lotions or deodorant on the day of surgery.  - Nail polish should be removed.  - Do not wear makeup or moisturizer  - Wear comfortable clothes, such as a button front shirt and loose fitting pants.  - Leave all jewelry, including body piercings, and valuables at home.    - Bring any devices you will neeed after surgery such as crutches or canes.  - If you have sleep apnea, please bring your CPAP machine  In the event that your physical condition changes including the onset of a cold or respiratory illness, or if you have to delay or cancel your surgery, please notify your surgeon.      Anesthesia: General Anesthesia  Youre due to have surgery. During surgery, youll be given medication called anesthesia. (It is also called anesthetic.) This will keep you comfortable and pain-free. Your anesthesia provider will use general anesthesia. This sheet tells you more about it.  What is general anesthesia?     You are watched continuously during your procedure by the anesthesia provider   General anesthesia puts you into a state like deep sleep. It goes into the bloodstream (IV anesthetics), into the lungs (gas anesthetics), or both. You feel nothing during the procedure. You will not remember it. During the procedure, the anesthesia provider monitors you continuously. He or she checks your heart rate and rhythm, blood pressure, breathing, and blood oxygen.  · IV Anesthetics. IV anesthetics are given through an IV line in your arm. Theyre often given first. This is so you are asleep before a gas anesthetic is started. Some kinds of IV  anesthetics relieve pain. Others relax you. Your doctor will decide which kind is best in your case.  · Gas Anesthetics. Gas anesthetics are breathed into the lungs. They are often used to keep you asleep. They can be given through a facemask or a tube placed in your larynx or trachea (breathing tube).  ? If you have a facemask, your anesthesia provider will most likely place it over your nose and mouth while youre still awake. Youll breathe oxygen through the mask as your IV anesthetic is started. Gas anesthetic may be added through the mask.  ? If you have a tube in the larynx or trachea, it will be inserted into your throat after youre asleep.  Anesthesia tools and medications  You will likely have:  · IV anesthetics. These are put into an IV line into your bloodstream.  · Gas anesthetics. You breathe these anesthetics into your lungs, where they pass into your bloodstream.  · Pulse oximeter. This is a small clip that is attached to the end of your finger. This measures your blood oxygen level.  · Electrocardiography leads (electrodes). These are small sticky pads that are placed on your chest. They record your heart rate and rhythm.  · Blood pressure cuff. This reads your blood pressure.  Risks and possible complications  General anesthesia has some risks. These include:  · Breathing problems  · Nausea and vomiting  · Sore throat or hoarseness (usually temporary)  · Allergic reaction to the anesthetic  · Irregular heartbeat (rare)  · Cardiac arrest (rare)   Anesthesia safety  · Follow all instructions you are given for how long not to eat or drink before your procedure.  · Be sure your doctor knows what medications and drugs you take. This includes over-the-counter medications, herbs, supplements, alcohol or other drugs. You will be asked when those were last taken.  · Have an adult family member or friend drive you home after the procedure.  · For the first 24 hours after your surgery:  ? Do not drive or use  heavy equipment.  ? Have a trusted family member or spouse make important decisions or sign documents.  ? Avoid alcohol.  ? Have a responsible adult stay with you. He or she can watch for problems and help keep you safe.  Date Last Reviewed: 10/16/2014  © 9912-4496 [employer]. [address] [place] [address]. All rights reserved. This information is not intended as a substitute for professional medical care. Always follow your healthcare professional's instructions.

## 2019-02-14 ENCOUNTER — TELEPHONE (OUTPATIENT)
Dept: SURGERY | Facility: CLINIC | Age: 54
End: 2019-02-14

## 2019-02-15 ENCOUNTER — ANESTHESIA (OUTPATIENT)
Dept: SURGERY | Facility: HOSPITAL | Age: 54
DRG: 330 | End: 2019-02-15
Payer: COMMERCIAL

## 2019-02-15 ENCOUNTER — HOSPITAL ENCOUNTER (INPATIENT)
Facility: HOSPITAL | Age: 54
LOS: 4 days | Discharge: HOME | DRG: 330 | End: 2019-02-19
Attending: COLON & RECTAL SURGERY | Admitting: COLON & RECTAL SURGERY
Payer: COMMERCIAL

## 2019-02-15 DIAGNOSIS — K63.89 COLONIC MASS: ICD-10-CM

## 2019-02-15 LAB
B-HCG UR QL: NEGATIVE
CTP QC/QA: YES

## 2019-02-15 PROCEDURE — 25000003 PHARM REV CODE 250

## 2019-02-15 PROCEDURE — 88305 TISSUE SPECIMEN TO PATHOLOGY - SURGERY: ICD-10-PCS | Mod: 26,,, | Performed by: PATHOLOGY

## 2019-02-15 PROCEDURE — 63600175 PHARM REV CODE 636 W HCPCS: Performed by: NURSE ANESTHETIST, CERTIFIED REGISTERED

## 2019-02-15 PROCEDURE — 63600175 PHARM REV CODE 636 W HCPCS

## 2019-02-15 PROCEDURE — 63600175 PHARM REV CODE 636 W HCPCS: Performed by: ANESTHESIOLOGY

## 2019-02-15 PROCEDURE — 44207 PR LAP,SURG,COLECTOMY,W/ANAST: ICD-10-PCS | Mod: ,,, | Performed by: COLON & RECTAL SURGERY

## 2019-02-15 PROCEDURE — 44213 LAP MOBIL SPLENIC FL ADD-ON: CPT | Mod: ,,, | Performed by: COLON & RECTAL SURGERY

## 2019-02-15 PROCEDURE — 27100025 HC TUBING, SET FLUID WARMER: Performed by: NURSE ANESTHETIST, CERTIFIED REGISTERED

## 2019-02-15 PROCEDURE — 71000033 HC RECOVERY, INTIAL HOUR: Performed by: COLON & RECTAL SURGERY

## 2019-02-15 PROCEDURE — 63600175 PHARM REV CODE 636 W HCPCS: Performed by: STUDENT IN AN ORGANIZED HEALTH CARE EDUCATION/TRAINING PROGRAM

## 2019-02-15 PROCEDURE — 44213 PR LAP, SURG MOBIL SPLENIC FL DUR PTL COLECTOMY: ICD-10-PCS | Mod: ,,, | Performed by: COLON & RECTAL SURGERY

## 2019-02-15 PROCEDURE — D9220A PRA ANESTHESIA: Mod: CRNA,,, | Performed by: NURSE ANESTHETIST, CERTIFIED REGISTERED

## 2019-02-15 PROCEDURE — 63600175 PHARM REV CODE 636 W HCPCS: Performed by: COLON & RECTAL SURGERY

## 2019-02-15 PROCEDURE — 37000009 HC ANESTHESIA EA ADD 15 MINS: Performed by: COLON & RECTAL SURGERY

## 2019-02-15 PROCEDURE — 94761 N-INVAS EAR/PLS OXIMETRY MLT: CPT

## 2019-02-15 PROCEDURE — 71000039 HC RECOVERY, EACH ADD'L HOUR: Performed by: COLON & RECTAL SURGERY

## 2019-02-15 PROCEDURE — 25000003 PHARM REV CODE 250: Performed by: COLON & RECTAL SURGERY

## 2019-02-15 PROCEDURE — 44207 L COLECTOMY/COLOPROCTOSTOMY: CPT | Mod: ,,, | Performed by: COLON & RECTAL SURGERY

## 2019-02-15 PROCEDURE — 37000008 HC ANESTHESIA 1ST 15 MINUTES: Performed by: COLON & RECTAL SURGERY

## 2019-02-15 PROCEDURE — 27201423 OPTIME MED/SURG SUP & DEVICES STERILE SUPPLY: Performed by: COLON & RECTAL SURGERY

## 2019-02-15 PROCEDURE — 20600001 HC STEP DOWN PRIVATE ROOM

## 2019-02-15 PROCEDURE — C9290 INJ, BUPIVACAINE LIPOSOME: HCPCS | Performed by: COLON & RECTAL SURGERY

## 2019-02-15 PROCEDURE — D9220A PRA ANESTHESIA: ICD-10-PCS | Mod: CRNA,,, | Performed by: NURSE ANESTHETIST, CERTIFIED REGISTERED

## 2019-02-15 PROCEDURE — S0030 INJECTION, METRONIDAZOLE: HCPCS | Performed by: STUDENT IN AN ORGANIZED HEALTH CARE EDUCATION/TRAINING PROGRAM

## 2019-02-15 PROCEDURE — 99900035 HC TECH TIME PER 15 MIN (STAT)

## 2019-02-15 PROCEDURE — 25000003 PHARM REV CODE 250: Performed by: STUDENT IN AN ORGANIZED HEALTH CARE EDUCATION/TRAINING PROGRAM

## 2019-02-15 PROCEDURE — 36000713 HC OR TIME LEV V EA ADD 15 MIN: Performed by: COLON & RECTAL SURGERY

## 2019-02-15 PROCEDURE — 36000712 HC OR TIME LEV V 1ST 15 MIN: Performed by: COLON & RECTAL SURGERY

## 2019-02-15 PROCEDURE — D9220A PRA ANESTHESIA: Mod: ANES,,, | Performed by: ANESTHESIOLOGY

## 2019-02-15 PROCEDURE — 25000003 PHARM REV CODE 250: Performed by: NURSE ANESTHETIST, CERTIFIED REGISTERED

## 2019-02-15 PROCEDURE — 88305 TISSUE EXAM BY PATHOLOGIST: CPT | Performed by: PATHOLOGY

## 2019-02-15 PROCEDURE — 88309 TISSUE EXAM BY PATHOLOGIST: CPT | Mod: 26,,, | Performed by: PATHOLOGY

## 2019-02-15 PROCEDURE — D9220A PRA ANESTHESIA: ICD-10-PCS | Mod: ANES,,, | Performed by: ANESTHESIOLOGY

## 2019-02-15 PROCEDURE — 88309 TISSUE SPECIMEN TO PATHOLOGY - SURGERY: ICD-10-PCS | Mod: 26,,, | Performed by: PATHOLOGY

## 2019-02-15 PROCEDURE — 88305 TISSUE EXAM BY PATHOLOGIST: CPT | Mod: 26,,, | Performed by: PATHOLOGY

## 2019-02-15 PROCEDURE — 94799 UNLISTED PULMONARY SVC/PX: CPT

## 2019-02-15 PROCEDURE — 27000221 HC OXYGEN, UP TO 24 HOURS

## 2019-02-15 PROCEDURE — 81025 URINE PREGNANCY TEST: CPT | Performed by: COLON & RECTAL SURGERY

## 2019-02-15 RX ORDER — PROPOFOL 10 MG/ML
VIAL (ML) INTRAVENOUS
Status: DISCONTINUED | OUTPATIENT
Start: 2019-02-15 | End: 2019-02-15

## 2019-02-15 RX ORDER — NEOSTIGMINE METHYLSULFATE 1 MG/ML
INJECTION, SOLUTION INTRAVENOUS
Status: DISCONTINUED | OUTPATIENT
Start: 2019-02-15 | End: 2019-02-15

## 2019-02-15 RX ORDER — HYDROMORPHONE HYDROCHLORIDE 1 MG/ML
0.2 INJECTION, SOLUTION INTRAMUSCULAR; INTRAVENOUS; SUBCUTANEOUS EVERY 5 MIN PRN
Status: DISCONTINUED | OUTPATIENT
Start: 2019-02-15 | End: 2019-02-15 | Stop reason: HOSPADM

## 2019-02-15 RX ORDER — SODIUM CHLORIDE, SODIUM LACTATE, POTASSIUM CHLORIDE, CALCIUM CHLORIDE 600; 310; 30; 20 MG/100ML; MG/100ML; MG/100ML; MG/100ML
INJECTION, SOLUTION INTRAVENOUS CONTINUOUS
Status: DISCONTINUED | OUTPATIENT
Start: 2019-02-15 | End: 2019-02-17

## 2019-02-15 RX ORDER — ONDANSETRON 2 MG/ML
INJECTION INTRAMUSCULAR; INTRAVENOUS
Status: DISCONTINUED | OUTPATIENT
Start: 2019-02-15 | End: 2019-02-15

## 2019-02-15 RX ORDER — SODIUM CHLORIDE 0.9 % (FLUSH) 0.9 %
3 SYRINGE (ML) INJECTION
Status: DISCONTINUED | OUTPATIENT
Start: 2019-02-15 | End: 2019-02-15 | Stop reason: HOSPADM

## 2019-02-15 RX ORDER — PHENYLEPHRINE HYDROCHLORIDE 10 MG/ML
INJECTION INTRAVENOUS
Status: DISCONTINUED | OUTPATIENT
Start: 2019-02-15 | End: 2019-02-15

## 2019-02-15 RX ORDER — SODIUM CHLORIDE 9 MG/ML
INJECTION, SOLUTION INTRAVENOUS CONTINUOUS
Status: DISCONTINUED | OUTPATIENT
Start: 2019-02-15 | End: 2019-02-16

## 2019-02-15 RX ORDER — ENOXAPARIN SODIUM 100 MG/ML
40 INJECTION SUBCUTANEOUS DAILY
Status: DISCONTINUED | OUTPATIENT
Start: 2019-02-16 | End: 2019-02-19 | Stop reason: HOSPADM

## 2019-02-15 RX ORDER — MUPIROCIN 20 MG/G
1 OINTMENT TOPICAL 2 TIMES DAILY
Status: DISCONTINUED | OUTPATIENT
Start: 2019-02-15 | End: 2019-02-19 | Stop reason: HOSPADM

## 2019-02-15 RX ORDER — BUPIVACAINE HYDROCHLORIDE 2.5 MG/ML
INJECTION, SOLUTION EPIDURAL; INFILTRATION; INTRACAUDAL
Status: DISCONTINUED | OUTPATIENT
Start: 2019-02-15 | End: 2019-02-15 | Stop reason: HOSPADM

## 2019-02-15 RX ORDER — HEPARIN SODIUM 5000 [USP'U]/ML
5000 INJECTION, SOLUTION INTRAVENOUS; SUBCUTANEOUS ONCE
Status: COMPLETED | OUTPATIENT
Start: 2019-02-15 | End: 2019-02-15

## 2019-02-15 RX ORDER — HYDROMORPHONE HYDROCHLORIDE 1 MG/ML
0.5 INJECTION, SOLUTION INTRAMUSCULAR; INTRAVENOUS; SUBCUTANEOUS EVERY 4 HOURS PRN
Status: DISCONTINUED | OUTPATIENT
Start: 2019-02-15 | End: 2019-02-16

## 2019-02-15 RX ORDER — NALOXONE HCL 0.4 MG/ML
0.02 VIAL (ML) INJECTION
Status: DISCONTINUED | OUTPATIENT
Start: 2019-02-15 | End: 2019-02-19 | Stop reason: HOSPADM

## 2019-02-15 RX ORDER — LIDOCAINE HYDROCHLORIDE 10 MG/ML
1 INJECTION, SOLUTION EPIDURAL; INFILTRATION; INTRACAUDAL; PERINEURAL ONCE
Status: COMPLETED | OUTPATIENT
Start: 2019-02-15 | End: 2019-02-15

## 2019-02-15 RX ORDER — MIDAZOLAM HYDROCHLORIDE 1 MG/ML
INJECTION, SOLUTION INTRAMUSCULAR; INTRAVENOUS
Status: DISCONTINUED | OUTPATIENT
Start: 2019-02-15 | End: 2019-02-15

## 2019-02-15 RX ORDER — MUPIROCIN 20 MG/G
OINTMENT TOPICAL
Status: DISCONTINUED | OUTPATIENT
Start: 2019-02-15 | End: 2019-02-15 | Stop reason: HOSPADM

## 2019-02-15 RX ORDER — SODIUM CHLORIDE 9 MG/ML
INJECTION, SOLUTION INTRAVENOUS CONTINUOUS
Status: DISCONTINUED | OUTPATIENT
Start: 2019-02-15 | End: 2019-02-15

## 2019-02-15 RX ORDER — GLYCOPYRROLATE 0.2 MG/ML
INJECTION INTRAMUSCULAR; INTRAVENOUS
Status: DISCONTINUED | OUTPATIENT
Start: 2019-02-15 | End: 2019-02-15

## 2019-02-15 RX ORDER — FENTANYL CITRATE 50 UG/ML
INJECTION, SOLUTION INTRAMUSCULAR; INTRAVENOUS
Status: DISCONTINUED | OUTPATIENT
Start: 2019-02-15 | End: 2019-02-15

## 2019-02-15 RX ORDER — METRONIDAZOLE 500 MG/100ML
500 INJECTION, SOLUTION INTRAVENOUS
Status: COMPLETED | OUTPATIENT
Start: 2019-02-15 | End: 2019-02-15

## 2019-02-15 RX ORDER — OXYCODONE HYDROCHLORIDE 5 MG/1
TABLET ORAL
Status: COMPLETED
Start: 2019-02-15 | End: 2019-02-15

## 2019-02-15 RX ORDER — ACETAMINOPHEN 10 MG/ML
INJECTION, SOLUTION INTRAVENOUS
Status: DISCONTINUED | OUTPATIENT
Start: 2019-02-15 | End: 2019-02-15

## 2019-02-15 RX ORDER — KETAMINE HCL IN 0.9 % NACL 50 MG/5 ML
SYRINGE (ML) INTRAVENOUS
Status: DISCONTINUED | OUTPATIENT
Start: 2019-02-15 | End: 2019-02-15

## 2019-02-15 RX ORDER — ONDANSETRON 2 MG/ML
4 INJECTION INTRAMUSCULAR; INTRAVENOUS EVERY 6 HOURS PRN
Status: DISCONTINUED | OUTPATIENT
Start: 2019-02-15 | End: 2019-02-19 | Stop reason: HOSPADM

## 2019-02-15 RX ORDER — ROCURONIUM BROMIDE 10 MG/ML
INJECTION, SOLUTION INTRAVENOUS
Status: DISCONTINUED | OUTPATIENT
Start: 2019-02-15 | End: 2019-02-15

## 2019-02-15 RX ORDER — HYDROMORPHONE HYDROCHLORIDE 1 MG/ML
INJECTION, SOLUTION INTRAMUSCULAR; INTRAVENOUS; SUBCUTANEOUS
Status: COMPLETED
Start: 2019-02-15 | End: 2019-02-15

## 2019-02-15 RX ORDER — DEXAMETHASONE SODIUM PHOSPHATE 4 MG/ML
INJECTION, SOLUTION INTRA-ARTICULAR; INTRALESIONAL; INTRAMUSCULAR; INTRAVENOUS; SOFT TISSUE
Status: DISCONTINUED | OUTPATIENT
Start: 2019-02-15 | End: 2019-02-15

## 2019-02-15 RX ORDER — OXYCODONE HYDROCHLORIDE 5 MG/1
5 TABLET ORAL EVERY 4 HOURS PRN
Status: DISCONTINUED | OUTPATIENT
Start: 2019-02-15 | End: 2019-02-19 | Stop reason: HOSPADM

## 2019-02-15 RX ORDER — LIDOCAINE HCL/PF 100 MG/5ML
SYRINGE (ML) INTRAVENOUS
Status: DISCONTINUED | OUTPATIENT
Start: 2019-02-15 | End: 2019-02-15

## 2019-02-15 RX ADMIN — FENTANYL CITRATE 50 MCG: 50 INJECTION, SOLUTION INTRAMUSCULAR; INTRAVENOUS at 07:02

## 2019-02-15 RX ADMIN — MUPIROCIN 1 G: 20 OINTMENT TOPICAL at 09:02

## 2019-02-15 RX ADMIN — IBUPROFEN 800 MG: 800 INJECTION INTRAVENOUS at 01:02

## 2019-02-15 RX ADMIN — FENTANYL CITRATE 25 MCG: 50 INJECTION, SOLUTION INTRAMUSCULAR; INTRAVENOUS at 12:02

## 2019-02-15 RX ADMIN — HYDROMORPHONE HYDROCHLORIDE 0.2 MG: 1 INJECTION, SOLUTION INTRAMUSCULAR; INTRAVENOUS; SUBCUTANEOUS at 12:02

## 2019-02-15 RX ADMIN — MUPIROCIN: 20 OINTMENT TOPICAL at 06:02

## 2019-02-15 RX ADMIN — LIDOCAINE HYDROCHLORIDE 80 MG: 20 INJECTION, SOLUTION INTRAVENOUS at 07:02

## 2019-02-15 RX ADMIN — FENTANYL CITRATE 50 MCG: 50 INJECTION, SOLUTION INTRAMUSCULAR; INTRAVENOUS at 12:02

## 2019-02-15 RX ADMIN — GLYCOPYRROLATE 0.6 MG: 0.2 INJECTION, SOLUTION INTRAMUSCULAR; INTRAVENOUS at 11:02

## 2019-02-15 RX ADMIN — ROCURONIUM BROMIDE 10 MG: 10 INJECTION, SOLUTION INTRAVENOUS at 11:02

## 2019-02-15 RX ADMIN — LIDOCAINE HYDROCHLORIDE 0.2 MG: 10 INJECTION, SOLUTION EPIDURAL; INFILTRATION; INTRACAUDAL; PERINEURAL at 06:02

## 2019-02-15 RX ADMIN — PHENYLEPHRINE HYDROCHLORIDE 100 MCG: 10 INJECTION INTRAVENOUS at 09:02

## 2019-02-15 RX ADMIN — ROCURONIUM BROMIDE 20 MG: 10 INJECTION, SOLUTION INTRAVENOUS at 08:02

## 2019-02-15 RX ADMIN — SODIUM CHLORIDE, SODIUM GLUCONATE, SODIUM ACETATE, POTASSIUM CHLORIDE, MAGNESIUM CHLORIDE, SODIUM PHOSPHATE, DIBASIC, AND POTASSIUM PHOSPHATE: .53; .5; .37; .037; .03; .012; .00082 INJECTION, SOLUTION INTRAVENOUS at 08:02

## 2019-02-15 RX ADMIN — HYDROMORPHONE HYDROCHLORIDE 0.2 MG: 1 INJECTION, SOLUTION INTRAMUSCULAR; INTRAVENOUS; SUBCUTANEOUS at 01:02

## 2019-02-15 RX ADMIN — CEFTRIAXONE 2 G: 2 INJECTION, SOLUTION INTRAVENOUS at 07:02

## 2019-02-15 RX ADMIN — METRONIDAZOLE 500 MG: 500 SOLUTION INTRAVENOUS at 06:02

## 2019-02-15 RX ADMIN — SODIUM CHLORIDE: 0.9 INJECTION, SOLUTION INTRAVENOUS at 06:02

## 2019-02-15 RX ADMIN — OXYCODONE HYDROCHLORIDE 5 MG: 5 TABLET ORAL at 12:02

## 2019-02-15 RX ADMIN — SODIUM CHLORIDE, SODIUM LACTATE, POTASSIUM CHLORIDE, AND CALCIUM CHLORIDE: .6; .31; .03; .02 INJECTION, SOLUTION INTRAVENOUS at 12:02

## 2019-02-15 RX ADMIN — DEXAMETHASONE SODIUM PHOSPHATE 8 MG: 4 INJECTION, SOLUTION INTRAMUSCULAR; INTRAVENOUS at 07:02

## 2019-02-15 RX ADMIN — PROPOFOL 140 MG: 10 INJECTION, EMULSION INTRAVENOUS at 07:02

## 2019-02-15 RX ADMIN — ONDANSETRON 4 MG: 2 INJECTION INTRAMUSCULAR; INTRAVENOUS at 11:02

## 2019-02-15 RX ADMIN — Medication 25 MG: at 07:02

## 2019-02-15 RX ADMIN — MIDAZOLAM HYDROCHLORIDE 2 MG: 1 INJECTION, SOLUTION INTRAMUSCULAR; INTRAVENOUS at 06:02

## 2019-02-15 RX ADMIN — FENTANYL CITRATE 100 MCG: 50 INJECTION, SOLUTION INTRAMUSCULAR; INTRAVENOUS at 07:02

## 2019-02-15 RX ADMIN — NEOSTIGMINE METHYLSULFATE 3.5 MG: 1 INJECTION INTRAVENOUS at 11:02

## 2019-02-15 RX ADMIN — ACETAMINOPHEN 1000 MG: 10 INJECTION, SOLUTION INTRAVENOUS at 07:02

## 2019-02-15 RX ADMIN — SODIUM CHLORIDE, SODIUM GLUCONATE, SODIUM ACETATE, POTASSIUM CHLORIDE, MAGNESIUM CHLORIDE, SODIUM PHOSPHATE, DIBASIC, AND POTASSIUM PHOSPHATE: .53; .5; .37; .037; .03; .012; .00082 INJECTION, SOLUTION INTRAVENOUS at 07:02

## 2019-02-15 RX ADMIN — FENTANYL CITRATE 25 MCG: 50 INJECTION, SOLUTION INTRAMUSCULAR; INTRAVENOUS at 11:02

## 2019-02-15 RX ADMIN — HEPARIN SODIUM 5000 UNITS: 5000 INJECTION, SOLUTION INTRAVENOUS; SUBCUTANEOUS at 06:02

## 2019-02-15 RX ADMIN — ROCURONIUM BROMIDE 20 MG: 10 INJECTION, SOLUTION INTRAVENOUS at 09:02

## 2019-02-15 RX ADMIN — IBUPROFEN 800 MG: 800 INJECTION INTRAVENOUS at 09:02

## 2019-02-15 RX ADMIN — ROCURONIUM BROMIDE 50 MG: 10 INJECTION, SOLUTION INTRAVENOUS at 07:02

## 2019-02-15 RX ADMIN — FENTANYL CITRATE 50 MCG: 50 INJECTION, SOLUTION INTRAMUSCULAR; INTRAVENOUS at 10:02

## 2019-02-15 RX ADMIN — OXYCODONE HYDROCHLORIDE 5 MG: 5 TABLET ORAL at 04:02

## 2019-02-15 NOTE — BRIEF OP NOTE
[facility]  Brief Operative Note    SUMMARY     Surgery Date: 2/15/2019     Surgeon(s) and Role:     * Zachery Augustin MD - Primary     * Lety Nye MD - Resident - Assisting     * Aurelio Matthews MD - Resident - Assisting     * Ladarius Dooley MD - Cast        Pre-Operative Care:  Pre-operative bowel prep Mechanical and PO antibiotics  IV antibiotics given within 1 hour of incision? Yes  Preoperative multimodal pain control? 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   Total UOP: .  Anesthesia Start: .  Anesthesia Stop: .    Technical Procedures Used:  Robotic sigmoid colectomy, splenic flexure mobilization   Use of closing instrument setup? N/A  Gown/Glove Change @ time of closing? N/A  Suction tip change at time of closing? N/A  Wound Protector used if open case? N/A    Description of the findings of the procedure:   1. Rectosigmoid lesion  Wound Class (Clean-contaminated    Complications: No     Estimated Blood Loss: * No values recorded between 2/15/2019  7:44 AM and 2/15/2019 12:15 PM * 50cc          Specimens:   Specimen (12h ago, onward)    Start     Ordered    02/15/19 1207  Specimen to Pathology - Surgery  Once     Comments:  1. Rectosigmoid - PERM2. Proximal Donut - PERM3. Distal Donut - PERM     Start Status   02/15/19 1207 Collected (02/15/19 1213)       02/15/19 1213          Implants: * No implants in log *    Post-Operative Care:         Disposition: PACU - hemodynamically stable.           Condition: Stable  PT Temp >36 upon leaving the OR? Yes

## 2019-02-15 NOTE — PLAN OF CARE
Pt pre op completed, UPT negative, blood extension sent this am.  at bedside visiting with pt and will take her belongings. Orders and anesthesia consent pending.

## 2019-02-15 NOTE — PLAN OF CARE
0743  Rotbot time out completed with pre incision time out.  All DaVinci instruments inspected before case by Hanh Nielson.  All instruments appear to be intact.      1215  All DaVinci instruments inspected after case by Hanh Nielson.  All Instruments appear intact.

## 2019-02-15 NOTE — NURSING TRANSFER
Nursing Transfer Note      2/15/2019     Transfer to room 1006 From PACU    Transfer via bed    Transfer with cardiac monitoring    Transported by Patient escort    Medicines sent:     Chart send with patient: Yes    Notified: spouse; Jairo 10th floor RN

## 2019-02-15 NOTE — TRANSFER OF CARE
"Anesthesia Transfer of Care Note    Patient: Irene Galo    Procedure(s) Performed: Procedure(s) (LRB):  XI ROBOTIC COLECTOMY, SIGMOID (N/A)  MOBILIZATION, SPLENIC FLEXURE    Patient location: PACU    Anesthesia Type: general    Transport from OR: Transported from OR on room air with adequate spontaneous ventilation    Post pain: adequate analgesia    Post assessment: no apparent anesthetic complications    Post vital signs: stable    Level of consciousness: awake, alert and oriented    Nausea/Vomiting: no nausea/vomiting    Complications: none    Transfer of care protocol was followed      Last vitals:   Visit Vitals  BP (!) 145/77   Pulse 67   Temp 36.9 °C (98.4 °F) (Temporal)   Resp 14   Ht 5' 2" (1.575 m)   Wt 84 kg (185 lb 3 oz)   LMP 11/28/2018   SpO2 100%   Breastfeeding? No   BMI 33.87 kg/m²     "

## 2019-02-16 LAB
ANION GAP SERPL CALC-SCNC: 8 MMOL/L
BASOPHILS # BLD AUTO: 0.02 K/UL
BASOPHILS NFR BLD: 0.1 %
BUN SERPL-MCNC: 8 MG/DL
CALCIUM SERPL-MCNC: 8.6 MG/DL
CHLORIDE SERPL-SCNC: 109 MMOL/L
CO2 SERPL-SCNC: 21 MMOL/L
CREAT SERPL-MCNC: 0.7 MG/DL
DIFFERENTIAL METHOD: ABNORMAL
EOSINOPHIL # BLD AUTO: 0 K/UL
EOSINOPHIL NFR BLD: 0 %
ERYTHROCYTE [DISTWIDTH] IN BLOOD BY AUTOMATED COUNT: 13.6 %
EST. GFR  (AFRICAN AMERICAN): >60 ML/MIN/1.73 M^2
EST. GFR  (NON AFRICAN AMERICAN): >60 ML/MIN/1.73 M^2
GLUCOSE SERPL-MCNC: 96 MG/DL
HCT VFR BLD AUTO: 39 %
HGB BLD-MCNC: 12.5 G/DL
IMM GRANULOCYTES # BLD AUTO: 0.05 K/UL
IMM GRANULOCYTES NFR BLD AUTO: 0.3 %
LYMPHOCYTES # BLD AUTO: 1.4 K/UL
LYMPHOCYTES NFR BLD: 9.1 %
MAGNESIUM SERPL-MCNC: 1.9 MG/DL
MCH RBC QN AUTO: 29.8 PG
MCHC RBC AUTO-ENTMCNC: 32.1 G/DL
MCV RBC AUTO: 93 FL
MONOCYTES # BLD AUTO: 1.1 K/UL
MONOCYTES NFR BLD: 6.7 %
NEUTROPHILS # BLD AUTO: 13.1 K/UL
NEUTROPHILS NFR BLD: 83.8 %
NRBC BLD-RTO: 0 /100 WBC
PHOSPHATE SERPL-MCNC: 2.4 MG/DL
PLATELET # BLD AUTO: 259 K/UL
PMV BLD AUTO: 11.8 FL
POTASSIUM SERPL-SCNC: 4.1 MMOL/L
RBC # BLD AUTO: 4.19 M/UL
SODIUM SERPL-SCNC: 138 MMOL/L
WBC # BLD AUTO: 15.64 K/UL

## 2019-02-16 PROCEDURE — 84100 ASSAY OF PHOSPHORUS: CPT

## 2019-02-16 PROCEDURE — 20600001 HC STEP DOWN PRIVATE ROOM

## 2019-02-16 PROCEDURE — 80048 BASIC METABOLIC PNL TOTAL CA: CPT

## 2019-02-16 PROCEDURE — 36415 COLL VENOUS BLD VENIPUNCTURE: CPT

## 2019-02-16 PROCEDURE — 25000003 PHARM REV CODE 250: Performed by: STUDENT IN AN ORGANIZED HEALTH CARE EDUCATION/TRAINING PROGRAM

## 2019-02-16 PROCEDURE — 85025 COMPLETE CBC W/AUTO DIFF WBC: CPT

## 2019-02-16 PROCEDURE — 63600175 PHARM REV CODE 636 W HCPCS: Performed by: STUDENT IN AN ORGANIZED HEALTH CARE EDUCATION/TRAINING PROGRAM

## 2019-02-16 PROCEDURE — 83735 ASSAY OF MAGNESIUM: CPT

## 2019-02-16 RX ORDER — OXYCODONE AND ACETAMINOPHEN 10; 325 MG/1; MG/1
1 TABLET ORAL EVERY 4 HOURS PRN
Status: DISCONTINUED | OUTPATIENT
Start: 2019-02-16 | End: 2019-02-19 | Stop reason: HOSPADM

## 2019-02-16 RX ORDER — HYDROMORPHONE HYDROCHLORIDE 1 MG/ML
1 INJECTION, SOLUTION INTRAMUSCULAR; INTRAVENOUS; SUBCUTANEOUS EVERY 4 HOURS PRN
Status: DISCONTINUED | OUTPATIENT
Start: 2019-02-16 | End: 2019-02-19 | Stop reason: HOSPADM

## 2019-02-16 RX ADMIN — IBUPROFEN 800 MG: 800 INJECTION INTRAVENOUS at 09:02

## 2019-02-16 RX ADMIN — MUPIROCIN 1 G: 20 OINTMENT TOPICAL at 09:02

## 2019-02-16 RX ADMIN — OXYCODONE HYDROCHLORIDE AND ACETAMINOPHEN 1 TABLET: 10; 325 TABLET ORAL at 09:02

## 2019-02-16 RX ADMIN — ENOXAPARIN SODIUM 40 MG: 100 INJECTION SUBCUTANEOUS at 08:02

## 2019-02-16 RX ADMIN — IBUPROFEN 800 MG: 800 INJECTION INTRAVENOUS at 06:02

## 2019-02-16 RX ADMIN — OXYCODONE HYDROCHLORIDE AND ACETAMINOPHEN 1 TABLET: 10; 325 TABLET ORAL at 08:02

## 2019-02-16 RX ADMIN — SODIUM CHLORIDE, SODIUM LACTATE, POTASSIUM CHLORIDE, AND CALCIUM CHLORIDE: .6; .31; .03; .02 INJECTION, SOLUTION INTRAVENOUS at 09:02

## 2019-02-16 RX ADMIN — IBUPROFEN 800 MG: 800 INJECTION INTRAVENOUS at 01:02

## 2019-02-16 RX ADMIN — OXYCODONE HYDROCHLORIDE AND ACETAMINOPHEN 1 TABLET: 10; 325 TABLET ORAL at 05:02

## 2019-02-16 RX ADMIN — OXYCODONE HYDROCHLORIDE AND ACETAMINOPHEN 1 TABLET: 10; 325 TABLET ORAL at 01:02

## 2019-02-16 RX ADMIN — SODIUM CHLORIDE, SODIUM LACTATE, POTASSIUM CHLORIDE, AND CALCIUM CHLORIDE: .6; .31; .03; .02 INJECTION, SOLUTION INTRAVENOUS at 04:02

## 2019-02-16 RX ADMIN — MUPIROCIN 1 G: 20 OINTMENT TOPICAL at 08:02

## 2019-02-16 NOTE — PLAN OF CARE
Problem: Adult Inpatient Plan of Care  Goal: Plan of Care Review  Outcome: Ongoing (interventions implemented as appropriate)  VS stable. Telemetry= NSR 60s. Right side abdominal incision and lap sites intact, dermabond noted. No drainage noted. No complaints overnight. Hooks catheter in place with yellow, clear urine. IVFs LR at 100ml/hr. Will D/c Hooks catheter this morning per MD's orders and will continue to monitor.

## 2019-02-16 NOTE — ASSESSMENT & PLAN NOTE
POD 1 from robotic LAR. Doing well with minimal pain medication. Tolerating clears.     -Remove diaz  -oral pain meds as needed  -clears for now and possible LRD if bowel function  -decrease fluids  -daily labs  -dvt ppx  -oob today  -dispo pending bowel function

## 2019-02-16 NOTE — PROGRESS NOTES
[facility]  Colorectal Surgery  Progress Note    Patient Name: Irene Galo  MRN: [id number]  Admission Date: 2/15/2019  Hospital Length of Stay: 1 days  Attending Physician: Zachery Augustin MD    Subjective:     Interval History: No acute events overnight. Pain controlled. No nausea or vomiting. Tolerating clears. No bowel function yet.     Post-Op Info:  Procedure(s) (LRB):  XI ROBOTIC COLECTOMY, SIGMOID (N/A)  MOBILIZATION, SPLENIC FLEXURE   1 Day Post-Op      Medications:  Continuous Infusions:   sodium chloride 0.9% Stopped (02/15/19 1101)    lactated ringers 100 mL/hr at 02/16/19 0442     Scheduled Meds:   enoxaparin  40 mg Subcutaneous Daily    ibuprofen  800 mg Intravenous Q8H    mupirocin  1 g Nasal BID     PRN Meds:   HYDROmorphone    naloxone    ondansetron    oxyCODONE        Objective:     Vital Signs (Most Recent):  Temp: 98.4 °F (36.9 °C) (02/16/19 0657)  Pulse: 64 (02/16/19 0708)  Resp: 17 (02/16/19 0657)  BP: 114/61 (02/16/19 0657)  SpO2: 96 % (02/16/19 0657) Vital Signs (24h Range):  Temp:  [97.9 °F (36.6 °C)-98.8 °F (37.1 °C)] 98.4 °F (36.9 °C)  Pulse:  [63-96] 64  Resp:  [12-20] 17  SpO2:  [93 %-100 %] 96 %  BP: (114-147)/(61-79) 114/61     Intake/Output - Last 3 Shifts       02/14 0700 - 02/15 0659 02/15 0700 - 02/16 0659 02/16 0700 - 02/17 0659    P.O.  320     I.V. (mL/kg)  4866 (57.9)     IV Piggyback  750     Total Intake(mL/kg)  5936 (70.7)     Urine (mL/kg/hr)  1790 (0.9)     Total Output  1790     Net  +4146                  Physical Exam   Constitutional: She appears well-developed and well-nourished. No distress.   HENT:   Head: Normocephalic and atraumatic.   Eyes: EOM are normal.   Neck: Normal range of motion.   Cardiovascular: Normal rate.   Pulmonary/Chest: Effort normal.   Abdominal: Soft. There is tenderness.   Appropriate incisional tenderness.    Neurological: She is alert.   Skin: Skin is warm and dry.   Psychiatric: She has a normal mood and  affect. Her behavior is normal.   Nursing note and vitals reviewed.      Significant Labs:  CBC:   Recent Labs   Lab 02/16/19  0404   WBC 15.64*   RBC 4.19   HGB 12.5   HCT 39.0      MCV 93   MCH 29.8   MCHC 32.1     CMP:   Recent Labs   Lab 02/16/19  0404   GLU 96   CALCIUM 8.6*      K 4.1   CO2 21*      BUN 8   CREATININE 0.7       Significant Diagnostics:  I have reviewed all pertinent imaging results/findings within the past 24 hours.    Assessment/Plan:     Colonic mass    POD 1 from robotic LAR. Doing well with minimal pain medication. Tolerating clears.     -Remove diaz  -oral pain meds as needed  -clears for now and possible LRD if bowel function  -decrease fluids  -daily labs  -dvt ppx  -oob today  -dispo pending bowel function           Maged Regan MD  Colorectal Surgery  [facility]

## 2019-02-17 LAB
ANION GAP SERPL CALC-SCNC: 4 MMOL/L
BASOPHILS # BLD AUTO: 0.04 K/UL
BASOPHILS NFR BLD: 0.3 %
BUN SERPL-MCNC: 6 MG/DL
CALCIUM SERPL-MCNC: 8.3 MG/DL
CHLORIDE SERPL-SCNC: 108 MMOL/L
CO2 SERPL-SCNC: 26 MMOL/L
CREAT SERPL-MCNC: 0.7 MG/DL
DIFFERENTIAL METHOD: ABNORMAL
EOSINOPHIL # BLD AUTO: 0.2 K/UL
EOSINOPHIL NFR BLD: 1.8 %
ERYTHROCYTE [DISTWIDTH] IN BLOOD BY AUTOMATED COUNT: 14.1 %
EST. GFR  (AFRICAN AMERICAN): >60 ML/MIN/1.73 M^2
EST. GFR  (NON AFRICAN AMERICAN): >60 ML/MIN/1.73 M^2
GLUCOSE SERPL-MCNC: 116 MG/DL
HCT VFR BLD AUTO: 37 %
HGB BLD-MCNC: 11.9 G/DL
IMM GRANULOCYTES # BLD AUTO: 0.04 K/UL
IMM GRANULOCYTES NFR BLD AUTO: 0.3 %
LYMPHOCYTES # BLD AUTO: 1.5 K/UL
LYMPHOCYTES NFR BLD: 12.1 %
MAGNESIUM SERPL-MCNC: 1.8 MG/DL
MCH RBC QN AUTO: 30.1 PG
MCHC RBC AUTO-ENTMCNC: 32.2 G/DL
MCV RBC AUTO: 94 FL
MONOCYTES # BLD AUTO: 0.8 K/UL
MONOCYTES NFR BLD: 6.1 %
NEUTROPHILS # BLD AUTO: 9.9 K/UL
NEUTROPHILS NFR BLD: 79.4 %
NRBC BLD-RTO: 0 /100 WBC
PHOSPHATE SERPL-MCNC: 1.8 MG/DL
PLATELET # BLD AUTO: 203 K/UL
PMV BLD AUTO: 11.6 FL
POTASSIUM SERPL-SCNC: 3.6 MMOL/L
RBC # BLD AUTO: 3.95 M/UL
SODIUM SERPL-SCNC: 138 MMOL/L
WBC # BLD AUTO: 12.48 K/UL

## 2019-02-17 PROCEDURE — 63600175 PHARM REV CODE 636 W HCPCS: Performed by: STUDENT IN AN ORGANIZED HEALTH CARE EDUCATION/TRAINING PROGRAM

## 2019-02-17 PROCEDURE — 25000003 PHARM REV CODE 250: Performed by: STUDENT IN AN ORGANIZED HEALTH CARE EDUCATION/TRAINING PROGRAM

## 2019-02-17 PROCEDURE — 83735 ASSAY OF MAGNESIUM: CPT

## 2019-02-17 PROCEDURE — 84100 ASSAY OF PHOSPHORUS: CPT

## 2019-02-17 PROCEDURE — 80048 BASIC METABOLIC PNL TOTAL CA: CPT

## 2019-02-17 PROCEDURE — 85025 COMPLETE CBC W/AUTO DIFF WBC: CPT

## 2019-02-17 PROCEDURE — 36415 COLL VENOUS BLD VENIPUNCTURE: CPT

## 2019-02-17 PROCEDURE — 20600001 HC STEP DOWN PRIVATE ROOM

## 2019-02-17 RX ADMIN — OXYCODONE HYDROCHLORIDE AND ACETAMINOPHEN 1 TABLET: 10; 325 TABLET ORAL at 05:02

## 2019-02-17 RX ADMIN — OXYCODONE HYDROCHLORIDE AND ACETAMINOPHEN 1 TABLET: 10; 325 TABLET ORAL at 04:02

## 2019-02-17 RX ADMIN — MUPIROCIN 1 G: 20 OINTMENT TOPICAL at 08:02

## 2019-02-17 RX ADMIN — ONDANSETRON 4 MG: 2 INJECTION INTRAMUSCULAR; INTRAVENOUS at 02:02

## 2019-02-17 RX ADMIN — OXYCODONE HYDROCHLORIDE AND ACETAMINOPHEN 1 TABLET: 10; 325 TABLET ORAL at 08:02

## 2019-02-17 RX ADMIN — ENOXAPARIN SODIUM 40 MG: 100 INJECTION SUBCUTANEOUS at 08:02

## 2019-02-17 RX ADMIN — IBUPROFEN 800 MG: 800 INJECTION INTRAVENOUS at 03:02

## 2019-02-17 RX ADMIN — IBUPROFEN 800 MG: 800 INJECTION INTRAVENOUS at 05:02

## 2019-02-17 NOTE — PLAN OF CARE
Problem: Adult Inpatient Plan of Care  Goal: Plan of Care Review  Outcome: Ongoing (interventions implemented as appropriate)  POC reviewed. AAO x 4 with VSS on RA. RLQ abdominal incision and lap sites x 5 Dermabond NINA. Incisional pain managed well with oral Oxy, IV dilaudid available if needed. Advanced and tolerating low fiber diet with no c/o nausea. Hypo-bowel sounds, no BM, and no gas at this time. Pt encouraged to ambulate halls frequently. Independent. Remains free of falls and injury. Adequate UOP noted. No acute distress or needs at this time.  at bedside. WCTM

## 2019-02-17 NOTE — ANESTHESIA POSTPROCEDURE EVALUATION
"Anesthesia Post Evaluation    Patient: Irene Galo    Procedure(s) Performed: Procedure(s) (LRB):  XI ROBOTIC COLECTOMY, SIGMOID (N/A)  MOBILIZATION, SPLENIC FLEXURE    Final Anesthesia Type: general  Patient location during evaluation: PACU  Patient participation: Yes- Able to Participate  Level of consciousness: awake and alert  Post-procedure vital signs: reviewed and stable  Pain management: adequate  Airway patency: patent  PONV status at discharge: No PONV  Anesthetic complications: no      Cardiovascular status: stable  Respiratory status: unassisted and spontaneous ventilation  Hydration status: euvolemic  Follow-up not needed.        Visit Vitals  BP (!) 110/58 (Patient Position: Lying)   Pulse 68   Temp 36.8 °C (98.3 °F) (Oral)   Resp 16   Ht 5' 2" (1.575 m)   Wt 84 kg (185 lb 3 oz)   LMP 11/28/2018   SpO2 (!) 93%   Breastfeeding? No   BMI 33.87 kg/m²       Pain/Bon Score: Pain Rating Prior to Med Admin: 6 (2/16/2019  5:21 PM)  Pain Rating Post Med Admin: 3 (2/16/2019  1:38 PM)  Bon Score: 10 (2/15/2019  5:00 PM)        "

## 2019-02-17 NOTE — PLAN OF CARE
Problem: Adult Inpatient Plan of Care  Goal: Plan of Care Review  Outcome: Ongoing (interventions implemented as appropriate)  POC reviewed with pt. Who verbalized understanding. AAOx 4. Remains free of falls and injuries. VSS. Tolerating LFLR diet, denies nausea. Pain controlled with PRN meds. Independent activity. Telemetry-SR. Pt ambulated 3 times overnight to the bathroom. No acute events. No distress noted. WCTM.

## 2019-02-17 NOTE — ASSESSMENT & PLAN NOTE
POD 2 from robotic LAR. Doing well with minimal pain medication. Tolerating LRD. No flatus or BM.     -oral pain meds as needed  -LRD  -saline lock  -daily labs  -dvt ppx  -oob today  -dispo pending bowel function

## 2019-02-17 NOTE — PROGRESS NOTES
[facility]  Colorectal Surgery  Progress Note    Patient Name: Irene Galo  MRN: [id number]  Admission Date: 2/15/2019  Hospital Length of Stay: 2 days  Attending Physician: Zachery Augustin MD    Subjective:     Interval History: No acute events overnight. Tolerating diet. No nausea or vomiting. No BM or flatus. Has not ambulated.     Post-Op Info:  Procedure(s) (LRB):  XI ROBOTIC COLECTOMY, SIGMOID (N/A)  MOBILIZATION, SPLENIC FLEXURE   2 Days Post-Op      Medications:  Continuous Infusions:   lactated ringers 50 mL/hr at 02/16/19 2135     Scheduled Meds:   enoxaparin  40 mg Subcutaneous Daily    ibuprofen  800 mg Intravenous Q8H    mupirocin  1 g Nasal BID     PRN Meds:   HYDROmorphone    naloxone    ondansetron    oxyCODONE    oxyCODONE-acetaminophen        Objective:     Vital Signs (Most Recent):  Temp: 98 °F (36.7 °C) (02/17/19 0712)  Pulse: 70 (02/17/19 0712)  Resp: 17 (02/17/19 0712)  BP: 116/63 (02/17/19 0712)  SpO2: 95 % (02/17/19 0712) Vital Signs (24h Range):  Temp:  [97.2 °F (36.2 °C)-98.6 °F (37 °C)] 98 °F (36.7 °C)  Pulse:  [64-75] 70  Resp:  [16-18] 17  SpO2:  [92 %-97 %] 95 %  BP: (106-129)/(57-72) 116/63     Intake/Output - Last 3 Shifts       02/15 0700 - 02/16 0659 02/16 0700 - 02/17 0659 02/17 0700 - 02/18 0659    P.O. 320 1470     I.V. (mL/kg) 4866 (57.9) 1155.8 (13.8)     IV Piggyback 750 500     Total Intake(mL/kg) 5936 (70.7) 3125.8 (37.2)     Urine (mL/kg/hr) 1790 (0.9) 1650 (0.8)     Emesis/NG output  0     Stool  0     Total Output 1790 1650     Net +4146 +1475.8            Stool Occurrence  0 x     Emesis Occurrence  0 x           Physical Exam   Constitutional: She appears well-developed and well-nourished. No distress.   HENT:   Head: Normocephalic and atraumatic.   Eyes: EOM are normal.   Neck: Normal range of motion.   Cardiovascular: Normal rate.   Pulmonary/Chest: Effort normal.   Abdominal: Soft. There is tenderness.   Appropriate incisional  tenderness.    Neurological: She is alert.   Skin: Skin is warm and dry.   Psychiatric: She has a normal mood and affect. Her behavior is normal.   Nursing note and vitals reviewed.      Significant Labs:  CBC:   Recent Labs   Lab 02/17/19  0549   WBC 12.48   RBC 3.95*   HGB 11.9*   HCT 37.0      MCV 94   MCH 30.1   MCHC 32.2     CMP:   Recent Labs   Lab 02/17/19  0549   *   CALCIUM 8.3*      K 3.6   CO2 26      BUN 6   CREATININE 0.7       Significant Diagnostics:  I have reviewed all pertinent imaging results/findings within the past 24 hours.    Assessment/Plan:     Colonic mass    POD 2 from robotic LAR. Doing well with minimal pain medication. Tolerating LRD. No flatus or BM.     -oral pain meds as needed  -LRD  -saline lock  -daily labs  -dvt ppx  -oob today  -dispo pending bowel function           Maged Regan MD  Colorectal Surgery  [facility]

## 2019-02-18 LAB
ANION GAP SERPL CALC-SCNC: 8 MMOL/L
BASOPHILS # BLD AUTO: 0.04 K/UL
BASOPHILS NFR BLD: 0.3 %
BUN SERPL-MCNC: 5 MG/DL
CALCIUM SERPL-MCNC: 8.8 MG/DL
CHLORIDE SERPL-SCNC: 103 MMOL/L
CO2 SERPL-SCNC: 26 MMOL/L
CREAT SERPL-MCNC: 0.6 MG/DL
DIFFERENTIAL METHOD: ABNORMAL
EOSINOPHIL # BLD AUTO: 0.3 K/UL
EOSINOPHIL NFR BLD: 1.8 %
ERYTHROCYTE [DISTWIDTH] IN BLOOD BY AUTOMATED COUNT: 13.7 %
EST. GFR  (AFRICAN AMERICAN): >60 ML/MIN/1.73 M^2
EST. GFR  (NON AFRICAN AMERICAN): >60 ML/MIN/1.73 M^2
GLUCOSE SERPL-MCNC: 68 MG/DL
HCT VFR BLD AUTO: 38.9 %
HGB BLD-MCNC: 12.4 G/DL
IMM GRANULOCYTES # BLD AUTO: 0.07 K/UL
IMM GRANULOCYTES NFR BLD AUTO: 0.5 %
LYMPHOCYTES # BLD AUTO: 1.7 K/UL
LYMPHOCYTES NFR BLD: 10.9 %
MAGNESIUM SERPL-MCNC: 1.9 MG/DL
MCH RBC QN AUTO: 30 PG
MCHC RBC AUTO-ENTMCNC: 31.9 G/DL
MCV RBC AUTO: 94 FL
MONOCYTES # BLD AUTO: 0.7 K/UL
MONOCYTES NFR BLD: 4.9 %
NEUTROPHILS # BLD AUTO: 12.4 K/UL
NEUTROPHILS NFR BLD: 81.6 %
NRBC BLD-RTO: 0 /100 WBC
PHOSPHATE SERPL-MCNC: 2.7 MG/DL
PLATELET # BLD AUTO: 240 K/UL
PMV BLD AUTO: 11.9 FL
POTASSIUM SERPL-SCNC: 3.5 MMOL/L
RBC # BLD AUTO: 4.14 M/UL
SODIUM SERPL-SCNC: 137 MMOL/L
WBC # BLD AUTO: 15.14 K/UL

## 2019-02-18 PROCEDURE — 80048 BASIC METABOLIC PNL TOTAL CA: CPT

## 2019-02-18 PROCEDURE — 84100 ASSAY OF PHOSPHORUS: CPT

## 2019-02-18 PROCEDURE — 94761 N-INVAS EAR/PLS OXIMETRY MLT: CPT

## 2019-02-18 PROCEDURE — 20600001 HC STEP DOWN PRIVATE ROOM

## 2019-02-18 PROCEDURE — 36415 COLL VENOUS BLD VENIPUNCTURE: CPT

## 2019-02-18 PROCEDURE — 25000003 PHARM REV CODE 250: Performed by: STUDENT IN AN ORGANIZED HEALTH CARE EDUCATION/TRAINING PROGRAM

## 2019-02-18 PROCEDURE — 83735 ASSAY OF MAGNESIUM: CPT

## 2019-02-18 PROCEDURE — 63600175 PHARM REV CODE 636 W HCPCS: Performed by: STUDENT IN AN ORGANIZED HEALTH CARE EDUCATION/TRAINING PROGRAM

## 2019-02-18 PROCEDURE — 85025 COMPLETE CBC W/AUTO DIFF WBC: CPT

## 2019-02-18 RX ORDER — OXYCODONE AND ACETAMINOPHEN 10; 325 MG/1; MG/1
1 TABLET ORAL EVERY 4 HOURS PRN
Qty: 30 TABLET | Refills: 0 | Status: SHIPPED | OUTPATIENT
Start: 2019-02-18 | End: 2019-02-19 | Stop reason: HOSPADM

## 2019-02-18 RX ADMIN — ENOXAPARIN SODIUM 40 MG: 100 INJECTION SUBCUTANEOUS at 08:02

## 2019-02-18 RX ADMIN — OXYCODONE HYDROCHLORIDE 5 MG: 5 TABLET ORAL at 05:02

## 2019-02-18 RX ADMIN — MUPIROCIN 1 G: 20 OINTMENT TOPICAL at 08:02

## 2019-02-18 RX ADMIN — OXYCODONE HYDROCHLORIDE 5 MG: 5 TABLET ORAL at 08:02

## 2019-02-18 RX ADMIN — OXYCODONE HYDROCHLORIDE 5 MG: 5 TABLET ORAL at 12:02

## 2019-02-18 NOTE — PLAN OF CARE
SW following for d/c needs. Pt expected to d/c home with no needs.      Melo Sargent, MSW, LCSW  [facility]  [id number]

## 2019-02-18 NOTE — SUBJECTIVE & OBJECTIVE
Subjective:     Interval History: Pt states she did well overnight.  VS WNLs. Pain controlled with PRNs.  No nausea reported.  No flatus/BM yet.    Post-Op Info:  Procedure(s) (LRB):  XI ROBOTIC COLECTOMY, SIGMOID (N/A)  MOBILIZATION, SPLENIC FLEXURE   3 Days Post-Op      Medications:  Continuous Infusions:  Scheduled Meds:   enoxaparin  40 mg Subcutaneous Daily    mupirocin  1 g Nasal BID     PRN Meds:   HYDROmorphone    naloxone    ondansetron    oxyCODONE    oxyCODONE-acetaminophen        Objective:     Vital Signs (Most Recent):  Temp: 99.6 °F (37.6 °C) (02/18/19 0743)  Pulse: 74 (02/18/19 0743)  Resp: 18 (02/18/19 0446)  BP: 124/69 (02/18/19 0743)  SpO2: (!) 93 % (02/18/19 0743) Vital Signs (24h Range):  Temp:  [96.4 °F (35.8 °C)-99.6 °F (37.6 °C)] 99.6 °F (37.6 °C)  Pulse:  [69-84] 74  Resp:  [16-18] 18  SpO2:  [93 %-97 %] 93 %  BP: (112-132)/(64-76) 124/69     Intake/Output - Last 3 Shifts       02/16 0700 - 02/17 0659 02/17 0700 - 02/18 0659 02/18 0700 - 02/19 0659    P.O. 1470 1690     I.V. (mL/kg) 1155.8 (13.8) 204.2 (2.4)     IV Piggyback 750 150     Total Intake(mL/kg) 3375.8 (40.2) 2044.2 (24.3)     Urine (mL/kg/hr) 1650 (0.8) 600 (0.3)     Emesis/NG output 0 0     Stool 0 0     Total Output 1650 600     Net +1725.8 +1444.2            Urine Occurrence  4 x     Stool Occurrence 0 x 0 x     Emesis Occurrence 0 x 0 x           Physical Exam   Constitutional: She appears well-developed and well-nourished. No distress.   HENT:   Head: Normocephalic and atraumatic.   Eyes: EOM are normal.   Neck: Normal range of motion.   Cardiovascular: Normal rate.   Pulmonary/Chest: Effort normal.   Abdominal: Soft. There is tenderness.   Appropriate incisional tenderness.    Neurological: She is alert.   Skin: Skin is warm and dry.   Psychiatric: She has a normal mood and affect. Her behavior is normal.   Nursing note and vitals reviewed.        BMP (Last 3 Results):   Recent Labs   Lab 02/16/19  3699  02/17/19  0549   GLU 96 116*    138   K 4.1 3.6    108   CO2 21* 26   BUN 8 6   CREATININE 0.7 0.7   CALCIUM 8.6* 8.3*   MG 1.9 1.8     CBC (Last 3 Results):   Recent Labs   Lab 02/16/19  0404 02/17/19  0549   WBC 15.64* 12.48   RBC 4.19 3.95*   HGB 12.5 11.9*   HCT 39.0 37.0    203   MCV 93 94   MCH 29.8 30.1   MCHC 32.1 32.2       Significant Diagnostics:  I have reviewed all pertinent imaging results/findings within the past 24 hours.

## 2019-02-18 NOTE — PLAN OF CARE
Problem: Adult Inpatient Plan of Care  Goal: Plan of Care Review  Outcome: Ongoing (interventions implemented as appropriate)  POC reviewed. Abdominal incision and lap sites x5 dermabond intact and NINA. VSS on RA. Pt ambulating halls and in room independently. Remains free of falls and injury. Tolerating low fiber/residual diet with no c/o nausea. Patient has passed a little amount of gas with no BM yet. Pt encouraged to ambulate frequently ad willa and remain hydrated orally. Adequate UOP noted. IVFs d/c'd today. Oral oxycodone administered and available Q4hrs, see MAR.  at bedside. IV has blood return but started leaking with infusion at 250 ml/hr, IVFs d/c'd earlier this afternoon and MD changed IV Ibuprofen to PO. Pt verbalizes request to avoid being stuck with another needle. No acute distress or needs at this time. TM

## 2019-02-18 NOTE — PROGRESS NOTES
[facility]  Colorectal Surgery  Progress Note    Patient Name: Irene Galo  MRN: [id number]  Admission Date: 2/15/2019  Hospital Length of Stay: 3 days  Attending Physician: Zachery Augustin MD    Subjective:     Interval History: Pt states she did well overnight.  VS WNLs. Pain controlled with PRNs.  No nausea reported.  No flatus/BM yet.    Post-Op Info:  Procedure(s) (LRB):  XI ROBOTIC COLECTOMY, SIGMOID (N/A)  MOBILIZATION, SPLENIC FLEXURE   3 Days Post-Op      Medications:  Continuous Infusions:  Scheduled Meds:   enoxaparin  40 mg Subcutaneous Daily    mupirocin  1 g Nasal BID     PRN Meds:   HYDROmorphone    naloxone    ondansetron    oxyCODONE    oxyCODONE-acetaminophen        Objective:     Vital Signs (Most Recent):  Temp: 99.6 °F (37.6 °C) (02/18/19 0743)  Pulse: 74 (02/18/19 0743)  Resp: 18 (02/18/19 0446)  BP: 124/69 (02/18/19 0743)  SpO2: (!) 93 % (02/18/19 0743) Vital Signs (24h Range):  Temp:  [96.4 °F (35.8 °C)-99.6 °F (37.6 °C)] 99.6 °F (37.6 °C)  Pulse:  [69-84] 74  Resp:  [16-18] 18  SpO2:  [93 %-97 %] 93 %  BP: (112-132)/(64-76) 124/69     Intake/Output - Last 3 Shifts       02/16 0700 - 02/17 0659 02/17 0700 - 02/18 0659 02/18 0700 - 02/19 0659    P.O. 1470 1690     I.V. (mL/kg) 1155.8 (13.8) 204.2 (2.4)     IV Piggyback 750 150     Total Intake(mL/kg) 3375.8 (40.2) 2044.2 (24.3)     Urine (mL/kg/hr) 1650 (0.8) 600 (0.3)     Emesis/NG output 0 0     Stool 0 0     Total Output 1650 600     Net +1725.8 +1444.2            Urine Occurrence  4 x     Stool Occurrence 0 x 0 x     Emesis Occurrence 0 x 0 x           Physical Exam   Constitutional: She appears well-developed and well-nourished. No distress.   HENT:   Head: Normocephalic and atraumatic.   Eyes: EOM are normal.   Neck: Normal range of motion.   Cardiovascular: Normal rate.   Pulmonary/Chest: Effort normal.   Abdominal: Soft. There is tenderness.   Appropriate incisional tenderness.    Neurological: She is  alert.   Skin: Skin is warm and dry.   Psychiatric: She has a normal mood and affect. Her behavior is normal.   Nursing note and vitals reviewed.        BMP (Last 3 Results):   Recent Labs   Lab 02/16/19  0404 02/17/19  0549   GLU 96 116*    138   K 4.1 3.6    108   CO2 21* 26   BUN 8 6   CREATININE 0.7 0.7   CALCIUM 8.6* 8.3*   MG 1.9 1.8     CBC (Last 3 Results):   Recent Labs   Lab 02/16/19  0404 02/17/19  0549   WBC 15.64* 12.48   RBC 4.19 3.95*   HGB 12.5 11.9*   HCT 39.0 37.0    203   MCV 93 94   MCH 29.8 30.1   MCHC 32.1 32.2       Significant Diagnostics:  I have reviewed all pertinent imaging results/findings within the past 24 hours.    Assessment/Plan:     Colonic mass    S/P robotic LAR on 2/15. Doing well with minimal pain medication. Tolerating LRD. No flatus or BM.     -oral pain meds as needed  -LRD  -saline lock  -daily labs (f/u today's labs)  -dvt ppx  -oob today  -dispo pending bowel function           Rudy Marshall MD  Colorectal Surgery  [facility]

## 2019-02-18 NOTE — OP NOTE
DATE OF PROCEDURE:  02/15/2019.    PREOPERATIVE DIAGNOSIS:  Rectosigmoid mass.    POSTOPERATIVE DIAGNOSIS:  Rectosigmoid mass.    PROCEDURES PERFORMED:  1.  Robotic rectosigmoid resection with primary anastomosis.  2.  Robotic splenic flexure mobilization.    SURGEON:  Zachery Augustin M.D.    ASSISTANT:  Dr. Aurelio Houston and Lety Nye M.D.    ANESTHESIA:  General endotracheal.    IV FLUIDS:  3 L of crystalloid.    BLOOD LOSS:  100 mL.    URINE OUTPUT:  190 mL.    SPECIMENS:  1.  Rectosigmoid colon.  2.  Proximal anastomotic ring.  3.  Distal anastomotic ring.    DRAINS:  Hooks catheter.    COMPLICATIONS:  None.    OPERATIVE FINDINGS:  Tumor at the level of the rectosigmoid junction,   successful mid colorectal anastomosis without evidence of extravasation on air   leak testing.    INDICATIONS:  The patient is a 53-year-old female who had undergone a screening   colonoscopy and was found to have a large mass lesion at the level of the   rectosigmoid junction.  Biopsy showed high-grade dysplasia; however, the lesion   grossly was suspicious for invasive cancer and it was not amenable to endoscopic   resection technique.  Given that, she is being brought to the Operating Room for   an elective resection.    DESCRIPTION OF PROCEDURE:  The patient was identified, brought to the Operating   Room and placed on the table in a supine position after obtaining informed   consent.  Venous sequential stockings were placed.  She was given preoperative   intravenous antibiotics as well as subcutaneous heparin.  After induction of   general endotracheal anesthesia, a Hooks catheter was placed using sterile   technique and she was repositioned in a modified lithotomy position using   Yellofin stirrups.  Both arms were tucked to the side.  Rectal irrigation was   performed.  The abdomen and perineum were then prepped and draped in the usual   sterile fashion.  Using an optical entry technique, the abdominal cavity was entered    laparoscopically through incision in the right upper quadrant utilizing the   AirSeal trocar.  Pneumoperitoneum was established and the laparoscope was   inserted to confirm intraperitoneal placement.  There were no significant   adhesions to the undersurface of the abdominal wall.  There was no evidence of   carcinomatosis and no evidence of liver lesions.  We then performed a TAP block   under direct laparoscopic visualization utilizing a combination of Exparel and   0.25% Marcaine diluted in injectable saline.  We then placed our 4 robotic trocars   in a straight line approximately 8 cm apart spanning from just medial to the   right anterior superior iliac spine to the left subcostal margin.  An additional   5 mm trocar was placed in the upper midline for added retraction.  The patient   was placed in reverse Trendelenburg and rotated to the right, and the robot was   brought and docked.  I then moved to the robotic console.  I began by mobilizing   the splenic flexure.  We identified the inferior mesenteric vein just lateral   to the sweep of the duodenum.  A plane was developed inferior to this and the   mesentery was mobilized anteriorly off of the retroperitoneum and Gerota's fascia   up to the level of splenic flexure.  Peritoneum was scored and this plane was   developed heading distally as far as possible.  Once we had fully developed this   plane, we then turned our attention towards the transverse colon.  The lesser   sac was entered through the gastrocolic ligament at the level of the distal   transverse colon.  The omentum was detached from the distal half of the   transverse colon.  Full mobilization of the splenic flexure was then performed   using a combination of sharp dissection and the robotic vessel sealer.  We then   incised the lateral attachments of the left colon as far distally as possible   and medialized the splenic flexure and descending colon.  The inferior   mesenteric vein was then  divided with the vessel sealer and the last remaining   tissues at the level of the splenic flexure were then incised.    Once the splenic flexure had been fully mobilized, we then turned our attention   towards the distal dissection.  The robot was undocked.  The patient was   repositioned in a Trendelenburg position and the robot was redocked.  We began   by elevating the inferior mesenteric artery and the peritoneum was incised just   lateral to this.  We developed the plane between the inferior mesenteric artery   and the retroperitoneum using a combination of blunt dissection and the vessel   sealer.  We were able to identify the left ureter easily.  We could identify the   tumor at the level of the distal sigmoid and rectosigmoid junction very easily   as well.  Once we had clearly identified the left ureter, we skeletonized the   inferior mesenteric artery and this was divided using the robotic vessel sealer.    The remaining mesenteric attachments of the sigmoid colon down to the level of   the sacral promontory were incised.  The lateral attachments of the distal   sigmoid colon also were incised as well using sharp dissection and the vessel   sealer as appropriate.  At this point, the entire left colon was mobile down to   the sacral promontory.  We mobilized the posterior mesorectum off of the   presacral fascia for a distance of several centimeters.  Once we had fully   mobilized the upper rectum, the mesorectum was cleared away with the vessel   sealer and the proximal rectum was stapled with the robotic stapler, ensuring   that we had an adequate distal margin from the tumor.    At this point, the left colon was fully mobile and had been divided down to the   upper rectum.  The end of the bowel was grasped with a locking grasper.  We then   undocked the robot and desufflated the abdomen.  The right lower quadrant 12 mm   robotic trocar was removed and the incision was extended for a total distance   of  4 cm.  A sterile wound protector was placed ,and the distal divided end of the    rectosigmoid was delivered up through the incision.  We   chose a point on the descending colon for our point of proximal division.  The   mesentery was cleared away.  The marginal artery was sharply divided   demonstrating pulsatile flow on the proximal end of the colon.  The marginal   artery was secured with 0 Vicryl tie.  We then placed a Furness clamp across the   colon.  A 2-0 Monosof suture was placed through the Furness clamp and the colon   was divided distal to the clamp and passed from the field.  The Furness clamp   was removed and the anvil from a 29 mm EEA stapler was inserted and secured with   the pursestring suture.  The end of the colon was then delivered back into the   abdominal cavity.  The wound protector in the right lower quadrant was occluded   and the abdomen was reinsufflated.  We then advanced the 29 mm EEA stapler   transanally up to the level of the rectal staple line.  The stapler spike was   advanced through the wall of the rectum adjacent to the staple line.  Under   direct laparoscopic visualization, the anvil in the distal end of the colon was   engaged with the stapler spike and the stapler was closed down and fired.  Care   was taken to maintain adequate mesenteric orientation.  There was no undue   tension on the anastomosis.  The stapler was then withdrawn and anastomotic   rings were inspected and noted to be intact.  We then performed a flexible   sigmoidoscopy to visualize the anastomosis, which was at approximately 10 cm   from the anal verge.  There was no extravasation of air from the staple line and   the staple line appeared hemostatic when it was visualized endoscopically.    Pelvis was then irrigated laparoscopically.  Hemostasis was noted to be   adequate.  The abdomen was desufflated and all of the trocars and the wound   protector were removed.  The right lower quadrant extraction  site was closed at   the fascial level with a running #1 PDS suture.  All of the skin incisions were   irrigated and closed with 4-0 Monocryl subcuticular closure.  Dermabond was   placed as a dressing over each of the incisions.    The patient tolerated the procedure well with no complications.  She was   extubated in the Operating Room and taken to Recovery in satisfactory condition.    All needle, instrument and sponge counts were correct at the end of the case.    I was present throughout the entire procedure.      SAMIRA  dd: 02/15/2019 17:40:28 (CST)  td: 02/18/2019 01:10:27 (CST)  Doc ID   #[id number]  Job ID #[id number]    CC:

## 2019-02-18 NOTE — ASSESSMENT & PLAN NOTE
S/P robotic LAR on 2/15. Doing well with minimal pain medication. Tolerating LRD. No flatus or BM.     -oral pain meds as needed  -LRD  -saline lock  -daily labs (f/u today's labs)  -dvt ppx  -oob today  -dispo pending bowel function

## 2019-02-19 VITALS
SYSTOLIC BLOOD PRESSURE: 127 MMHG | WEIGHT: 185.19 LBS | OXYGEN SATURATION: 96 % | DIASTOLIC BLOOD PRESSURE: 66 MMHG | BODY MASS INDEX: 34.08 KG/M2 | HEART RATE: 73 BPM | TEMPERATURE: 98 F | RESPIRATION RATE: 10 BRPM | HEIGHT: 62 IN

## 2019-02-19 LAB
ANION GAP SERPL CALC-SCNC: 6 MMOL/L
BASOPHILS # BLD AUTO: 0.05 K/UL
BASOPHILS NFR BLD: 0.4 %
BUN SERPL-MCNC: 7 MG/DL
CALCIUM SERPL-MCNC: 8.9 MG/DL
CHLORIDE SERPL-SCNC: 105 MMOL/L
CO2 SERPL-SCNC: 27 MMOL/L
CREAT SERPL-MCNC: 0.6 MG/DL
DIFFERENTIAL METHOD: ABNORMAL
EOSINOPHIL # BLD AUTO: 0.3 K/UL
EOSINOPHIL NFR BLD: 2.4 %
ERYTHROCYTE [DISTWIDTH] IN BLOOD BY AUTOMATED COUNT: 13.5 %
EST. GFR  (AFRICAN AMERICAN): >60 ML/MIN/1.73 M^2
EST. GFR  (NON AFRICAN AMERICAN): >60 ML/MIN/1.73 M^2
GLUCOSE SERPL-MCNC: 83 MG/DL
HCT VFR BLD AUTO: 36.6 %
HGB BLD-MCNC: 11.9 G/DL
IMM GRANULOCYTES # BLD AUTO: 0.06 K/UL
IMM GRANULOCYTES NFR BLD AUTO: 0.4 %
LYMPHOCYTES # BLD AUTO: 1.8 K/UL
LYMPHOCYTES NFR BLD: 12.7 %
MAGNESIUM SERPL-MCNC: 1.8 MG/DL
MCH RBC QN AUTO: 29.5 PG
MCHC RBC AUTO-ENTMCNC: 32.5 G/DL
MCV RBC AUTO: 91 FL
MONOCYTES # BLD AUTO: 0.9 K/UL
MONOCYTES NFR BLD: 6.2 %
NEUTROPHILS # BLD AUTO: 11 K/UL
NEUTROPHILS NFR BLD: 77.9 %
NRBC BLD-RTO: 0 /100 WBC
PHOSPHATE SERPL-MCNC: 3.2 MG/DL
PLATELET # BLD AUTO: 256 K/UL
PMV BLD AUTO: 11.5 FL
POTASSIUM SERPL-SCNC: 3.5 MMOL/L
RBC # BLD AUTO: 4.04 M/UL
SODIUM SERPL-SCNC: 138 MMOL/L
WBC # BLD AUTO: 14.12 K/UL

## 2019-02-19 PROCEDURE — 84100 ASSAY OF PHOSPHORUS: CPT

## 2019-02-19 PROCEDURE — 63600175 PHARM REV CODE 636 W HCPCS: Performed by: STUDENT IN AN ORGANIZED HEALTH CARE EDUCATION/TRAINING PROGRAM

## 2019-02-19 PROCEDURE — 85025 COMPLETE CBC W/AUTO DIFF WBC: CPT

## 2019-02-19 PROCEDURE — 36415 COLL VENOUS BLD VENIPUNCTURE: CPT

## 2019-02-19 PROCEDURE — 83735 ASSAY OF MAGNESIUM: CPT

## 2019-02-19 PROCEDURE — 25000003 PHARM REV CODE 250: Performed by: STUDENT IN AN ORGANIZED HEALTH CARE EDUCATION/TRAINING PROGRAM

## 2019-02-19 PROCEDURE — 80048 BASIC METABOLIC PNL TOTAL CA: CPT

## 2019-02-19 RX ORDER — OXYCODONE AND ACETAMINOPHEN 7.5; 325 MG/1; MG/1
1 TABLET ORAL EVERY 4 HOURS PRN
Qty: 30 TABLET | Refills: 0 | Status: SHIPPED | OUTPATIENT
Start: 2019-02-19 | End: 2019-02-28

## 2019-02-19 RX ADMIN — OXYCODONE HYDROCHLORIDE 5 MG: 5 TABLET ORAL at 12:02

## 2019-02-19 RX ADMIN — ENOXAPARIN SODIUM 40 MG: 100 INJECTION SUBCUTANEOUS at 08:02

## 2019-02-19 RX ADMIN — MUPIROCIN 1 G: 20 OINTMENT TOPICAL at 08:02

## 2019-02-19 RX ADMIN — OXYCODONE HYDROCHLORIDE 5 MG: 5 TABLET ORAL at 06:02

## 2019-02-19 NOTE — DISCHARGE SUMMARY
[facility]  Colorectal Surgery  Discharge Summary      Patient Name: Irene Galo  MRN: [id number]  Admission Date: 2/15/2019  Hospital Length of Stay: 4 days  Discharge Date and Time:  02/19/2019 7:51 AM  Attending Physician: Zachery Augustin MD   Discharging Provider: Rudy Marshall MD  Primary Care Provider: Juan Lamb MD     HPI:  HPI 52 yo F, underwent asymptomatic screening colonoscopy by me 1/10/19 - found to have a circumferential, partially obstructing mass in the rectosigmoid, not able to be traversed with scope, inferior edge at 18-19 cm from anal verge.     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 Although definitive malignancy is not                  identified within biopsy fragments, malignancy cannot be excluded within the lesion from which the              biopsies were obtained. Correlation with endoscopic findings is necessary for full interpretation.     CT C/A/P 1/15/19: (Images personally reviewed.)  -Focal region of thickening of the rectosigmoid colon measuring approximately 1.9 cm in length, which could possibly correspond to the abnormality seen on recent colonoscopy.  There are some adjacent slightly prominent lymph nodes superior to this region of thickening along the course of the inferior mesenteric vessels, measuring approximately 6.5 mm.  Correlation with pathology results is recommended.  -Diverticulosis coli without diverticulitis.  Small hiatal hernia.  -Suspected uterine fibroid.     No abdominal pain, unexplained weight loss, anorexia, recent change in bowel habits, or other constitutional symptoms.   No blood in her stool or rectal bleeding with BM's.  No prior colonoscopy.     No family hx of CRC or  IBD.        Procedure(s) (LRB):  XI ROBOTIC COLECTOMY, SIGMOID (N/A)  MOBILIZATION, SPLENIC FLEXURE     Hospital Course:  2/15/2019  Irene Galo underwent the above procedure on date above as treatment for a colonic mass. She tolerated the procedure well and her post-op course was uncomplicated. Prior to discharge home on 02/19/2019 her pain was well controlled on oral medications, tolerated diet, ambulated, spontaneously voided and experienced return of bowel function. She was discharged home in good condition on POD#4.          Significant Diagnostic Studies: Labs:   BMP:   Recent Labs   Lab 02/18/19  0645 02/19/19  0438   GLU 68* 83    138   K 3.5 3.5    105   CO2 26 27   BUN 5* 7   CREATININE 0.6 0.6   CALCIUM 8.8 8.9   MG 1.9 1.8       Pending Diagnostic Studies:     None        Final Active Diagnoses:    Diagnosis Date Noted POA    Colonic mass [K63.9] 02/15/2019 Yes      Problems Resolved During this Admission:      Discharged Condition: good    Disposition:     Follow Up:  Follow-up Information     Zachery Augustin MD In 2 weeks.    Specialty:  Colon and Rectal Surgery  Why:  For wound re-check, Post-op  Contact information:  [address] [place] [id number]  [phone]                 Patient Instructions:      Diet Adult Regular   Order Comments: Low residue diet     Lifting restrictions   Order Comments: No more than 10 lbs for 2 weeks.     No driving until:   Order Comments: No longer taking narcotic pain medications.     Notify your health care provider if you experience any of the following:  temperature >100.4     Notify your health care provider if you experience any of the following:  persistent nausea and vomiting or diarrhea     Notify your health care provider if you experience any of the following:  severe uncontrolled pain     Notify your health care provider if you experience any of the following:  redness, tenderness, or signs of infection (pain, swelling, redness, odor  or green/yellow discharge around incision site)     Notify your health care provider if you experience any of the following:  difficulty breathing or increased cough     Notify your health care provider if you experience any of the following:  severe persistent headache     Notify your health care provider if you experience any of the following:  worsening rash     Notify your health care provider if you experience any of the following:  persistent dizziness, light-headedness, or visual disturbances     Notify your health care provider if you experience any of the following:  increased confusion or weakness     No dressing needed     Medications:  Reconciled Home Medications:      Medication List      START taking these medications    oxyCODONE-acetaminophen 7.5-325 mg per tablet  Commonly known as:  PERCOCET  Take 1 tablet by mouth every 4 (four) hours as needed for Pain (Take 1-2 as needed for pain.).        CHANGE how you take these medications    LO LOESTRIN FE 1 mg-10 mcg (24)/10 mcg (2) Tab  Generic drug:  norethindrone-e.estradiol-iron  Take 1 tablet by mouth once daily.  What changed:  when to take this        CONTINUE taking these medications    cetirizine 10 MG tablet  Commonly known as:  ZYRTEC  Take 10 mg by mouth every evening.     famotidine 20 MG tablet  Commonly known as:  PEPCID  Take 20 mg by mouth daily as needed for Heartburn (acid reflux).     metroNIDAZOLE 500 MG tablet  Commonly known as:  FLAGYL  Take 1 tablet (500 mg total) by mouth As instructed. Take 1 tablet at 1pm, 2pm, 11pm the day before surgery     multivitamin with minerals tablet  Take 1 tablet by mouth once daily.     neomycin 500 mg Tab  Commonly known as:  MYCIFRADIN  Take 2 tablets (1,000 mg total) by mouth As instructed. Take 2 tablets at 1pm, 2pm, 11pm the day before surgery            Rudy Marshall MD  Colorectal Surgery  [facility]

## 2019-02-19 NOTE — SUBJECTIVE & OBJECTIVE
Subjective:     Interval History: Pt states that she did well overnight.  She is having flatus, and had a BM yesterday that was loose.  Her pain is controlled on oral pain medications.  She denies any nausea/vomiting.  WBC to 14 this AM.    Post-Op Info:  Procedure(s) (LRB):  XI ROBOTIC COLECTOMY, SIGMOID (N/A)  MOBILIZATION, SPLENIC FLEXURE   4 Days Post-Op      Medications:  Continuous Infusions:  Scheduled Meds:   enoxaparin  40 mg Subcutaneous Daily    mupirocin  1 g Nasal BID     PRN Meds:   HYDROmorphone    naloxone    ondansetron    oxyCODONE    oxyCODONE-acetaminophen        Objective:     Vital Signs (Most Recent):  Temp: 98.1 °F (36.7 °C) (02/19/19 0428)  Pulse: 71 (02/19/19 0428)  Resp: 18 (02/19/19 0428)  BP: 118/76 (02/19/19 0428)  SpO2: (!) 94 % (02/19/19 0428) Vital Signs (24h Range):  Temp:  [98 °F (36.7 °C)-99.6 °F (37.6 °C)] 98.1 °F (36.7 °C)  Pulse:  [71-82] 71  Resp:  [15-18] 18  SpO2:  [93 %-95 %] 94 %  BP: (118-134)/(63-76) 118/76     Intake/Output - Last 3 Shifts       02/17 0700 - 02/18 0659 02/18 0700 - 02/19 0659    P.O. 1690 1700    I.V. (mL/kg) 204.2 (2.4)     IV Piggyback 150     Total Intake(mL/kg) 2044.2 (24.3) 1700 (20.2)    Urine (mL/kg/hr) 600 (0.3) 1200 (0.6)    Emesis/NG output 0 0    Stool 0 0    Total Output 600 1200    Net +1444.2 +500          Urine Occurrence 4 x     Stool Occurrence 0 x 1 x    Emesis Occurrence 0 x 0 x          Physical Exam   Constitutional: She appears well-developed and well-nourished. No distress.   HENT:   Head: Normocephalic and atraumatic.   Eyes: EOM are normal.   Neck: Normal range of motion.   Cardiovascular: Normal rate.   Pulmonary/Chest: Effort normal.   Abdominal: Soft. There is tenderness.   Appropriate incisional tenderness.    Neurological: She is alert.   Skin: Skin is warm and dry.   Psychiatric: She has a normal mood and affect. Her behavior is normal.   Nursing note and vitals reviewed.      Significant Labs:  BMP (Last 3  Results):   Recent Labs   Lab 02/17/19  0549 02/18/19  0645 02/19/19  0438   * 68* 83    137 138   K 3.6 3.5 3.5    103 105   CO2 26 26 27   BUN 6 5* 7   CREATININE 0.7 0.6 0.6   CALCIUM 8.3* 8.8 8.9   MG 1.8 1.9 1.8     CBC (Last 3 Results):   Recent Labs   Lab 02/17/19  0549 02/18/19  0645 02/19/19  0438   WBC 12.48 15.14* 14.12*   RBC 3.95* 4.14 4.04   HGB 11.9* 12.4 11.9*   HCT 37.0 38.9 36.6*    240 256   MCV 94 94 91   MCH 30.1 30.0 29.5   MCHC 32.2 31.9* 32.5       Significant Diagnostics:  I have reviewed all pertinent imaging results/findings within the past 24 hours.

## 2019-02-19 NOTE — HOSPITAL COURSE
2/15/2019  Irene Galo underwent the above procedure on date above as treatment for a colonic mass. She tolerated the procedure well and her post-op course was uncomplicated. Prior to discharge home on 02/19/2019 her pain was well controlled on oral medications, tolerated diet, ambulated, spontaneously voided and experienced return of bowel function. She was discharged home in good condition on POD#4.

## 2019-02-19 NOTE — PLAN OF CARE
Problem: Adult Inpatient Plan of Care  Goal: Plan of Care Review  Outcome: Ongoing (interventions implemented as appropriate)  POC is reviewed and understood by patient. VS stable while on RA. Oriented x4. Pt on regular diet. Pt due to be discharged on tomorrow. One occurrence of BM noted. Pt ambulates independently the hallways, and sits up in chair throughout shift. HOB elevated. Call bell in reach. No signs of distress noted.

## 2019-02-19 NOTE — PLAN OF CARE
SW following for d/c needs. Pt expected to d/c home with no needs.        Melo Sargent, MSW, LCSW  [facility]  [id number]

## 2019-02-19 NOTE — NURSING
Discharge POC reviewed with pt and  at bedside, all questions and concerns addressed. Pt verbalized understanding of follow up instructions. Pt left floor via transport.

## 2019-02-19 NOTE — HPI
HPI 52 yo F, underwent asymptomatic screening colonoscopy by me 1/10/19 - found to have a circumferential, partially obstructing mass in the rectosigmoid, not able to be traversed with scope, inferior edge at 18-19 cm from anal verge.     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 Although definitive malignancy is not                  identified within biopsy fragments, malignancy cannot be excluded within the lesion from which the              biopsies were obtained. Correlation with endoscopic findings is necessary for full interpretation.     CT C/A/P 1/15/19: (Images personally reviewed.)  -Focal region of thickening of the rectosigmoid colon measuring approximately 1.9 cm in length, which could possibly correspond to the abnormality seen on recent colonoscopy.  There are some adjacent slightly prominent lymph nodes superior to this region of thickening along the course of the inferior mesenteric vessels, measuring approximately 6.5 mm.  Correlation with pathology results is recommended.  -Diverticulosis coli without diverticulitis.  Small hiatal hernia.  -Suspected uterine fibroid.     No abdominal pain, unexplained weight loss, anorexia, recent change in bowel habits, or other constitutional symptoms.   No blood in her stool or rectal bleeding with BM's.  No prior colonoscopy.     No family hx of CRC or IBD.

## 2019-02-19 NOTE — PLAN OF CARE
Problem: Adult Inpatient Plan of Care  Goal: Plan of Care Review  Outcome: Ongoing (interventions implemented as appropriate)  POC reviewed with patient who verbalized understanding. AAOx4. VSS on room air. Abdominal lap sites x5 CDI with derma bond. Pain controlled with PRN medications per MAR. Passing flatus, no BM overnight. Tolerating LFLR diet, denies any nausea. Independent activity. TEDS in place, refusing SCDS. Safety precautions maintained, call light within reach. Remains free from falls and injury. No acute events. No distress noted. Will continue to monitor.

## 2019-02-19 NOTE — ASSESSMENT & PLAN NOTE
S/P robotic LAR on 2/15. Doing well with minimal pain medication. Tolerating LRD. Having flatus and BM.     -Only abnormal lab is increased WBC to 14, which is down from yesterday, her vital signs are normal and she is progressing well since surgery.  -oral pain meds as needed  -LRD  -saline lock  -dvt ppx  -oob today    Will Discharge Today

## 2019-02-19 NOTE — PROGRESS NOTES
[facility]  Colorectal Surgery  Progress Note    Patient Name: Irene Galo  MRN: [id number]  Admission Date: 2/15/2019  Hospital Length of Stay: 4 days  Attending Physician: Zachery Augustin MD    Subjective:     Interval History: Pt states that she did well overnight.  She is having flatus, and had a BM yesterday that was loose.  Her pain is controlled on oral pain medications.  She denies any nausea/vomiting.  WBC to 14 this AM.    Post-Op Info:  Procedure(s) (LRB):  XI ROBOTIC COLECTOMY, SIGMOID (N/A)  MOBILIZATION, SPLENIC FLEXURE   4 Days Post-Op      Medications:  Continuous Infusions:  Scheduled Meds:   enoxaparin  40 mg Subcutaneous Daily    mupirocin  1 g Nasal BID     PRN Meds:   HYDROmorphone    naloxone    ondansetron    oxyCODONE    oxyCODONE-acetaminophen        Objective:     Vital Signs (Most Recent):  Temp: 98.1 °F (36.7 °C) (02/19/19 0428)  Pulse: 71 (02/19/19 0428)  Resp: 18 (02/19/19 0428)  BP: 118/76 (02/19/19 0428)  SpO2: (!) 94 % (02/19/19 0428) Vital Signs (24h Range):  Temp:  [98 °F (36.7 °C)-99.6 °F (37.6 °C)] 98.1 °F (36.7 °C)  Pulse:  [71-82] 71  Resp:  [15-18] 18  SpO2:  [93 %-95 %] 94 %  BP: (118-134)/(63-76) 118/76     Intake/Output - Last 3 Shifts       02/17 0700 - 02/18 0659 02/18 0700 - 02/19 0659    P.O. 1690 1700    I.V. (mL/kg) 204.2 (2.4)     IV Piggyback 150     Total Intake(mL/kg) 2044.2 (24.3) 1700 (20.2)    Urine (mL/kg/hr) 600 (0.3) 1200 (0.6)    Emesis/NG output 0 0    Stool 0 0    Total Output 600 1200    Net +1444.2 +500          Urine Occurrence 4 x     Stool Occurrence 0 x 1 x    Emesis Occurrence 0 x 0 x          Physical Exam   Constitutional: She appears well-developed and well-nourished. No distress.   HENT:   Head: Normocephalic and atraumatic.   Eyes: EOM are normal.   Neck: Normal range of motion.   Cardiovascular: Normal rate.   Pulmonary/Chest: Effort normal.   Abdominal: Soft. There is tenderness.   Appropriate incisional  tenderness.    Neurological: She is alert.   Skin: Skin is warm and dry.   Psychiatric: She has a normal mood and affect. Her behavior is normal.   Nursing note and vitals reviewed.      Significant Labs:  BMP (Last 3 Results):   Recent Labs   Lab 02/17/19  0549 02/18/19  0645 02/19/19  0438   * 68* 83    137 138   K 3.6 3.5 3.5    103 105   CO2 26 26 27   BUN 6 5* 7   CREATININE 0.7 0.6 0.6   CALCIUM 8.3* 8.8 8.9   MG 1.8 1.9 1.8     CBC (Last 3 Results):   Recent Labs   Lab 02/17/19  0549 02/18/19  0645 02/19/19  0438   WBC 12.48 15.14* 14.12*   RBC 3.95* 4.14 4.04   HGB 11.9* 12.4 11.9*   HCT 37.0 38.9 36.6*    240 256   MCV 94 94 91   MCH 30.1 30.0 29.5   MCHC 32.2 31.9* 32.5       Significant Diagnostics:  I have reviewed all pertinent imaging results/findings within the past 24 hours.    Assessment/Plan:     Colonic mass    S/P robotic LAR on 2/15. Doing well with minimal pain medication. Tolerating LRD. Having flatus and BM.     -Only abnormal lab is increased WBC to 14, which is down from yesterday, her vital signs are normal and she is progressing well since surgery.  -oral pain meds as needed  -LRD  -saline lock  -dvt ppx  -oob today    Will Discharge Today           Rudy Marshall MD  Colorectal Surgery  [facility]

## 2019-02-20 NOTE — PLAN OF CARE
PCP: Juan Lamb MD    Pharmacy:   [facility] [address] [address]  Phone: [phone] Fax: [phone]    Payor: BLUE CROSS BLUE SHIELD / Plan: [place] PPO / Product Type: PPO /      02/19/19 1005   Discharge Assessment   Assessment Type Discharge Planning Assessment   Confirmed/corrected address and phone number on facesheet? Yes   Assessment information obtained from? Patient   Communicated expected length of stay with patient/caregiver yes   Prior to hospitilization cognitive status: Alert/Oriented   Prior to hospitalization functional status: Independent   Current cognitive status: Alert/Oriented   Lives With spouse   Able to Return to Prior Arrangements yes   Patient's perception of discharge disposition home or selfcare   Readmission Within the Last 30 Days no previous admission in last 30 days   Patient currently being followed by outpatient case management? No   Patient currently receives any other outside agency services? No   Equipment Currently Used at Home none   Do you have any problems affording any of your prescribed medications? No   Is the patient taking medications as prescribed? yes   Does the patient have transportation home? Yes   Transportation Anticipated family or friend will provide   Dialysis Name and Scheduled days n/a   Does the patient receive services at the Coumadin Clinic? No   Discharge Plan A Home;Home with family   Discharge Plan B Home;Home with family;Home Health   DME Needed Upon Discharge  none   Patient/Family in Agreement with Plan yes

## 2019-02-20 NOTE — PLAN OF CARE
CM met with patient to discuss today's discharge to home. Patient in agreement with discharge plan. Patient denies need for additional assistance at home. Patient's , Jose Roberto ( [phone]) is at bedside, will provide patient with transportation home. CM informed patient of follow up appt, pt verbalized understanding. CM informed staff nurse of pt's discharge plan.    Future Appointments   Date Time Provider Department Center   2/28/2019  2:00 PM Zachery Augustin MD [facility]        02/19/19 1015   Final Note   Assessment Type Final Discharge Note   Anticipated Discharge Disposition Home   What phone number can be called within the next 1-3 days to see how you are doing after discharge? [phone]   Hospital Follow Up  Appt(s) scheduled? Yes   Discharge plans and expectations educations in teach back method with documentation complete? Yes

## 2019-02-28 ENCOUNTER — OFFICE VISIT (OUTPATIENT)
Dept: SURGERY | Facility: CLINIC | Age: 54
End: 2019-02-28
Payer: COMMERCIAL

## 2019-02-28 VITALS
BODY MASS INDEX: 33.47 KG/M2 | HEIGHT: 62 IN | SYSTOLIC BLOOD PRESSURE: 126 MMHG | HEART RATE: 82 BPM | DIASTOLIC BLOOD PRESSURE: 78 MMHG | WEIGHT: 181.88 LBS | RESPIRATION RATE: 14 BRPM

## 2019-02-28 DIAGNOSIS — D37.5: Primary | ICD-10-CM

## 2019-02-28 PROCEDURE — 99024 PR POST-OP FOLLOW-UP VISIT: ICD-10-PCS | Mod: S$GLB,,, | Performed by: COLON & RECTAL SURGERY

## 2019-02-28 PROCEDURE — 99999 PR PBB SHADOW E&M-EST. PATIENT-LVL III: CPT | Mod: PBBFAC,,, | Performed by: COLON & RECTAL SURGERY

## 2019-02-28 PROCEDURE — 99024 POSTOP FOLLOW-UP VISIT: CPT | Mod: S$GLB,,, | Performed by: COLON & RECTAL SURGERY

## 2019-02-28 PROCEDURE — 99999 PR PBB SHADOW E&M-EST. PATIENT-LVL III: ICD-10-PCS | Mod: PBBFAC,,, | Performed by: COLON & RECTAL SURGERY

## 2019-02-28 NOTE — LETTER
March 4, 2019      Juan Lamb MD  [address]  [facility] [address]           [place]al [facility]  [address] [facility] [address]  Phone: [phone]  Fax: [phone]          Patient: Irene Galo   MR Number: [id number]   YOB: 1965   Date of Visit: 2/28/2019       Dear Dr. Lamb:    Thank you for referring Irene Galo to me for evaluation. Attached you will find relevant portions of my assessment and plan of care.    If you have questions, please do not hesitate to call me. I look forward to following Irene Galo along with you.    Sincerely,    Zachery Augustin MD    Enclosure  CC:  No Recipients    If you would like to receive this communication electronically, please contact [email or web address] or [phone] to request more information on [employer] access.    For providers and/or their staff who would like to refer a patient to [facility], please contact us through our one-stop-shop provider referral line, [facility], at [phone].    If you feel you have received this communication in error or would no longer like to receive these types of communications, please e-mail [email or web address]

## 2019-03-04 NOTE — PROGRESS NOTES
CRS Post-operative visit    Visit Info:     Procedure:  1.  Robotic rectosigmoid resection with primary anastomosis.  2.  Robotic splenic flexure mobilization.    Date of Procedure: February 15, 2019    Indication: 54 yo F, underwent asymptomatic screening colonoscopy by me 1/10/19 - found to have a circumferential, partially obstructing mass in the rectosigmoid, not able to be traversed with scope, inferior edge at 18-19 cm from anal verge.     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 Although definitive malignancy is not                  identified within biopsy fragments, malignancy cannot be excluded within the lesion from which the              biopsies were obtained. Correlation with endoscopic findings is necessary for full interpretation.     CT C/A/P 1/15/19: (Images personally reviewed.)  -Focal region of thickening of the rectosigmoid colon measuring approximately 1.9 cm in length, which could possibly correspond to the abnormality seen on recent colonoscopy.  There are some adjacent slightly prominent lymph nodes superior to this region of thickening along the course of the inferior mesenteric vessels, measuring approximately 6.5 mm.  Correlation with pathology results is recommended.  -Diverticulosis coli without diverticulitis.  Small hiatal hernia.  -Suspected uterine fibroid.     CEA 3.3    No abdominal pain, unexplained weight loss, anorexia, recent change in bowel habits, or other constitutional symptoms.   No blood in her stool or rectal bleeding with BM's.  No prior colonoscopy.     Date of Discharge: February 19, 2019    Current Status:  Doing well postop.  No pain aside from mild pain at the right lower quadrant extraction site.  No fevers or chills.   Her appetite is improving and she is eating with no nausea or vomiting.  Her bowel movements are getting more formed.    Pathology:   Still pending    Physical Exam:  General: White female in NAD   Neuro: aaox4   Respiratory: resps even unlabored  Abdomen:  Soft, non distended, appropriately tender, well-healed lap trocar incisions and right lower quadrant extraction site incision  Extremities: Warm dry and intact    Assessment:  Rectosigmoid mass, status post robotic resection    Plan:  Await pathology.  Increase activity as tolerated.  Advance diet, increase fiber intake.  RTO 4 weeks  Tentative RTW 04/01/2019    Zachery Augustin MD, FACS, FASCRS  Senior Staff Surgeon  Department of Colon & Rectal Surgery

## 2019-03-11 ENCOUNTER — TELEPHONE (OUTPATIENT)
Dept: OBSTETRICS AND GYNECOLOGY | Facility: CLINIC | Age: 54
End: 2019-03-11

## 2019-03-14 NOTE — PHYSICIAN QUERY
PT Name: Irene Galo  MR #: [id number]    Physician Query Form - Pathology Findings Clarification     CDS/: Chasity Zamora RN, CDS               Contact information: [email or web address]  This form is a permanent document in the medical record.     Query Date: March 14, 2019      By submitting this query, we are merely seeking further clarification of documentation.  Please utilize your independent clinical judgment when addressing the question(s) below.      The medical record contains the following:     Findings Supporting Clinical Information Location in Medical Record   Diagnosis Diagnosis   Colonic mass [K63.9]  DC Summary 2/19/19   Pathology Findings      Invasive moderately differentiated adenocarcinoma of colon with extension through muscularis propria , see  Tumor Synoptic.  Associated high-grade dysplasia arising in a tubular adenoma.  Serosal adhesions.  No definitive perineural invasion  Focal areas suspicious for lymphovascular invasion.  One of thirteen (1/13) lymph nodes POSITIVE for metastatic adenocarcinoma.  Six (6) pericolic/perirectal tumor deposits present.  Pathologic staging: pT3 N1a Path Report  Collected: 2/15/19  Reported: 3/12/19     Please document the clinical significance of the Pathologists findings of __Invasive moderately differentiated adenocarcinoma of colon_and One of thirteen (1/13) lymph nodes POSITIVE for metastatic adenocarcinoma       [ x  ] I agree with the Pathology Findings   [   ] I do not agree with the Pathology Findings   [   ] Other/Clarification of Findings:   [   ] Clinically Insignificant   [  ] Clinically Undetermined       Please document in your progress notes daily for the duration of treatment until resolved and include in your discharge summary.

## 2019-03-18 ENCOUNTER — TELEPHONE (OUTPATIENT)
Dept: HEMATOLOGY/ONCOLOGY | Facility: CLINIC | Age: 54
End: 2019-03-18

## 2019-03-19 ENCOUNTER — OFFICE VISIT (OUTPATIENT)
Dept: OBSTETRICS AND GYNECOLOGY | Facility: CLINIC | Age: 54
End: 2019-03-19
Payer: COMMERCIAL

## 2019-03-19 VITALS
DIASTOLIC BLOOD PRESSURE: 78 MMHG | WEIGHT: 180.75 LBS | BODY MASS INDEX: 33.26 KG/M2 | RESPIRATION RATE: 10 BRPM | SYSTOLIC BLOOD PRESSURE: 120 MMHG | HEIGHT: 62 IN | HEART RATE: 88 BPM

## 2019-03-19 DIAGNOSIS — Z30.41 SURVEILLANCE OF CONTRACEPTIVE PILL: ICD-10-CM

## 2019-03-19 DIAGNOSIS — Z01.419 WELL WOMAN EXAM WITH ROUTINE GYNECOLOGICAL EXAM: Primary | ICD-10-CM

## 2019-03-19 DIAGNOSIS — C18.9 MALIGNANT NEOPLASM OF COLON, UNSPECIFIED PART OF COLON: ICD-10-CM

## 2019-03-19 DIAGNOSIS — Z12.39 SCREENING FOR BREAST CANCER: ICD-10-CM

## 2019-03-19 DIAGNOSIS — Z12.4 CERVICAL CANCER SCREENING: ICD-10-CM

## 2019-03-19 DIAGNOSIS — D25.9 UTERINE LEIOMYOMA, UNSPECIFIED LOCATION: ICD-10-CM

## 2019-03-19 PROCEDURE — 99999 PR PBB SHADOW E&M-EST. PATIENT-LVL III: CPT | Mod: PBBFAC,,, | Performed by: OBSTETRICS & GYNECOLOGY

## 2019-03-19 PROCEDURE — 99396 PR PREVENTIVE VISIT,EST,40-64: ICD-10-PCS | Mod: S$GLB,,, | Performed by: OBSTETRICS & GYNECOLOGY

## 2019-03-19 PROCEDURE — 88175 CYTOPATH C/V AUTO FLUID REDO: CPT

## 2019-03-19 PROCEDURE — 87624 HPV HI-RISK TYP POOLED RSLT: CPT

## 2019-03-19 PROCEDURE — 99396 PREV VISIT EST AGE 40-64: CPT | Mod: S$GLB,,, | Performed by: OBSTETRICS & GYNECOLOGY

## 2019-03-19 PROCEDURE — 99999 PR PBB SHADOW E&M-EST. PATIENT-LVL III: ICD-10-PCS | Mod: PBBFAC,,, | Performed by: OBSTETRICS & GYNECOLOGY

## 2019-03-19 RX ORDER — NORETHINDRONE ACETATE AND ETHINYL ESTRADIOL, ETHINYL ESTRADIOL AND FERROUS FUMARATE 1MG-10(24)
1 KIT ORAL DAILY
Qty: 84 TABLET | Refills: 4 | Status: SHIPPED | OUTPATIENT
Start: 2019-03-19 | End: 2020-01-10

## 2019-03-19 NOTE — PROGRESS NOTES
"  Subjective:    Patient ID: Irene Galo is a 53 y.o. y.o. female.     Chief Complaint: Annual Well Woman Exam     History of Present Illness:  Irene presents today for Annual Well Woman exam. She describes her menses as regular every month without intermenstrual spotting.She denies pelvic pain.  She denies breast tenderness, masses, nipple discharge. She denies difficulty with urination or bowel movements. She denies menopausal symptoms such as hotflashes, vaginal dryness, and night sweats. She denies bloating, early satiety, or weight changes. She is sexually active. Contraception is by oral contraceptives (estrogen/progesterone), vasectomy.    Patient recently diagnosed with colon cancer and needs to start chemotherapy.     The following portions of the patient's history were reviewed and updated as appropriate: allergies, current medications, past family history, past medical history, past social history, past surgical history and problem list.      Menstrual History:   Patient's last menstrual period was 2019..     OB History      Para Term  AB Living    4 4 4     5    SAB TAB Ectopic Multiple Live Births          1            The following portions of the patient's history were reviewed and updated as appropriate: allergies, current medications, past family history, past medical history, past social history, past surgical history and problem list.        ROS:     Review of Systems    Objective:    Vital Signs:  Vitals:    19 1443   BP: 120/78   Pulse: 88   Resp: 10   Weight: 82 kg (180 lb 12.4 oz)   Height: 5' 2" (1.575 m)         Physical Exam:  General:  alert, cooperative, appears stated age   Skin:  Skin color, texture, turgor normal. No rashes or lesions   HEENT:  conjunctivae/corneas clear. PERRL.   Neck: supple, trachea midline, no adenopathy or thyromegally   Respiratory:  clear to auscultation bilaterally   Heart:  regular rate and rhythm, S1, S2 normal, no murmur, " click, rub or gallop   Breasts:  no discharge, erythema, or tenderness   Abdomen:  normal findings: bowel sounds normal, no masses palpable, no organomegaly and soft, non-tender   Pelvis: External genitalia: normal general appearance  Urinary system: urethral meatus normal, bladder nontender  Vaginal: normal mucosa without prolapse or lesions  Cervix: normal appearance  Uterus: normal size, shape, position  Adnexa: normal size, nontender bilaterally   Extremities: Normal ROM; no edema, no cyanosis   Neurologial: Normal strength and tone. No focal numbness or weakness. Reflexes 2+ and equal.   Psychiatric: normal mood, speech, dress, and thought processes         Assessment:       Healthy female exam.     1. Well woman exam with routine gynecological exam    2. Cervical cancer screening    3. Screening for breast cancer    4. Malignant neoplasm of colon, unspecified part of colon    5. Uterine leiomyoma, unspecified location    6. Surveillance of contraceptive pill          Plan:       Thin prep Pap smear.    HPV cotesting  MMG ordered  Refill OCPs  RTC in 1 year    COUNSELING:  Irene was counseled on STD pevention, use and side-effects of various contraceptive measures, A.C.O.G. Pap guidelines and recommendations for yearly pelvic exams in addition to recommendations for monthly self breast exams; to see her PCP for other health maintenance.

## 2019-03-22 ENCOUNTER — HOSPITAL ENCOUNTER (OUTPATIENT)
Dept: RADIOLOGY | Facility: HOSPITAL | Age: 54
Discharge: HOME | End: 2019-03-22
Attending: INTERNAL MEDICINE
Payer: COMMERCIAL

## 2019-03-22 ENCOUNTER — INITIAL CONSULT (OUTPATIENT)
Dept: HEMATOLOGY/ONCOLOGY | Facility: CLINIC | Age: 54
End: 2019-03-22
Payer: COMMERCIAL

## 2019-03-22 ENCOUNTER — TELEPHONE (OUTPATIENT)
Dept: HEMATOLOGY/ONCOLOGY | Facility: CLINIC | Age: 54
End: 2019-03-22

## 2019-03-22 VITALS
BODY MASS INDEX: 33.07 KG/M2 | SYSTOLIC BLOOD PRESSURE: 139 MMHG | DIASTOLIC BLOOD PRESSURE: 80 MMHG | TEMPERATURE: 98 F | OXYGEN SATURATION: 98 % | HEART RATE: 66 BPM | WEIGHT: 179.69 LBS | RESPIRATION RATE: 16 BRPM | HEIGHT: 62 IN

## 2019-03-22 DIAGNOSIS — C77.9 SECONDARY ADENOCARCINOMA OF LYMPH NODE: ICD-10-CM

## 2019-03-22 DIAGNOSIS — C20 RECTAL CANCER: Primary | ICD-10-CM

## 2019-03-22 DIAGNOSIS — C18.7 MALIGNANT NEOPLASM OF SIGMOID COLON: ICD-10-CM

## 2019-03-22 DIAGNOSIS — C18.7 MALIGNANT NEOPLASM OF SIGMOID COLON: Primary | ICD-10-CM

## 2019-03-22 LAB
HPV HR 12 DNA CVX QL NAA+PROBE: NEGATIVE
HPV16 AG SPEC QL: NEGATIVE
HPV18 DNA SPEC QL NAA+PROBE: NEGATIVE

## 2019-03-22 PROCEDURE — 99245 PR OFFICE CONSULTATION,LEVEL V: ICD-10-PCS | Mod: S$GLB,,, | Performed by: INTERNAL MEDICINE

## 2019-03-22 PROCEDURE — 71260 CT THORAX DX C+: CPT | Mod: TC

## 2019-03-22 PROCEDURE — 99999 PR PBB SHADOW E&M-EST. PATIENT-LVL V: ICD-10-PCS | Mod: PBBFAC,,, | Performed by: INTERNAL MEDICINE

## 2019-03-22 PROCEDURE — 99245 OFF/OP CONSLTJ NEW/EST HI 55: CPT | Mod: S$GLB,,, | Performed by: INTERNAL MEDICINE

## 2019-03-22 PROCEDURE — 25500020 PHARM REV CODE 255: Performed by: INTERNAL MEDICINE

## 2019-03-22 PROCEDURE — 99999 PR PBB SHADOW E&M-EST. PATIENT-LVL V: CPT | Mod: PBBFAC,,, | Performed by: INTERNAL MEDICINE

## 2019-03-22 PROCEDURE — 74177 CT ABD & PELVIS W/CONTRAST: CPT | Mod: TC

## 2019-03-22 PROCEDURE — 74177 CT ABD & PELVIS W/CONTRAST: CPT | Mod: 26,,, | Performed by: RADIOLOGY

## 2019-03-22 PROCEDURE — 74177 CT ABDOMEN PELVIS WITH CONTRAST: ICD-10-PCS | Mod: 26,,, | Performed by: RADIOLOGY

## 2019-03-22 PROCEDURE — 71260 CT CHEST WITH CONTRAST: ICD-10-PCS | Mod: 26,,, | Performed by: RADIOLOGY

## 2019-03-22 PROCEDURE — 71260 CT THORAX DX C+: CPT | Mod: 26,,, | Performed by: RADIOLOGY

## 2019-03-22 RX ORDER — PROMETHAZINE HYDROCHLORIDE 25 MG/1
25 TABLET ORAL EVERY 6 HOURS PRN
Qty: 60 TABLET | Refills: 7 | Status: SHIPPED | OUTPATIENT
Start: 2019-03-22 | End: 2019-03-29

## 2019-03-22 RX ORDER — ONDANSETRON 8 MG/1
8 TABLET, ORALLY DISINTEGRATING ORAL EVERY 6 HOURS PRN
Qty: 60 TABLET | Refills: 4 | Status: SHIPPED | OUTPATIENT
Start: 2019-03-22 | End: 2020-03-21

## 2019-03-22 RX ADMIN — IOHEXOL 15 ML: 350 INJECTION, SOLUTION INTRAVENOUS at 01:03

## 2019-03-22 RX ADMIN — IOHEXOL 75 ML: 350 INJECTION, SOLUTION INTRAVENOUS at 01:03

## 2019-03-22 NOTE — PATIENT INSTRUCTIONS
Fluorouracil, 5-FU injection  What is this medicine?  FLUOROURACIL, 5-FU (flure oh YOOR a humble) is a chemotherapy drug. It slows the growth of cancer cells. This medicine is used to treat many types of cancer like breast cancer, colon or rectal cancer, pancreatic cancer, and stomach cancer.  How should I use this medicine?  This drug is given as an infusion or injection into a vein. It is administered in a hospital or clinic by a specially trained health care professional.  Talk to your pediatrician regarding the use of this medicine in children. Special care may be needed.  What side effects may I notice from receiving this medicine?  Side effects that you should report to your doctor or health care professional as soon as possible:  · allergic reactions like skin rash, itching or hives, swelling of the face, lips, or tongue  · low blood counts - this medicine may decrease the number of white blood cells, red blood cells and platelets. You may be at increased risk for infections and bleeding.  · signs of infection - fever or chills, cough, sore throat, pain or difficulty passing urine  · signs of decreased platelets or bleeding - bruising, pinpoint red spots on the skin, black, tarry stools, blood in the urine  · signs of decreased red blood cells - unusually weak or tired, fainting spells, lightheadedness  · breathing problems  · changes in vision  · chest pain  · mouth sores  · nausea and vomiting  · pain, swelling, redness at site where injected  · pain, tingling, numbness in the hands or feet  · redness, swelling, or sores on hands or feet  · stomach pain  · unusual bleeding  Side effects that usually do not require medical attention (report to your doctor or health care professional if they continue or are bothersome):  · changes in finger or toe nails  · diarrhea  · dry or itchy skin  · hair loss  · headache  · loss of appetite  · sensitivity of eyes to the light  · stomach upset  · unusually teary  eyes  What may interact with this medicine?  · allopurinol  · cimetidine  · dapsone  · digoxin  · hydroxyurea  · leucovorin  · levamisole  · medicines for seizures like ethotoin, fosphenytoin, phenytoin  · medicines to increase blood counts like filgrastim, pegfilgrastim, sargramostim  · medicines that treat or prevent blood clots like warfarin, enoxaparin, and dalteparin  · methotrexate  · metronidazole  · pyrimethamine  · some other chemotherapy drugs like busulfan, cisplatin, estramustine, vinblastine  · trimethoprim  · trimetrexate  · vaccines  Talk to your doctor or health care professional before taking any of these medicines:  · acetaminophen  · aspirin  · ibuprofen  · ketoprofen  · naproxen  What if I miss a dose?  It is important not to miss your dose. Call your doctor or health care professional if you are unable to keep an appointment.  Where should I keep my medicine?  This drug is given in a hospital or clinic and will not be stored at home.  What should I tell my health care provider before I take this medicine?  They need to know if you have any of these conditions:  · blood disorders  · dihydropyrimidine dehydrogenase (DPD) deficiency  · infection (especially a virus infection such as chickenpox, cold sores, or herpes)  · kidney disease  · liver disease  · malnourished, poor nutrition  · recent or ongoing radiation therapy  · an unusual or allergic reaction to fluorouracil, other chemotherapy, other medicines, foods, dyes, or preservatives  · pregnant or trying to get pregnant  · breast-feeding  What should I watch for while using this medicine?  Visit your doctor for checks on your progress. This drug may make you feel generally unwell. This is not uncommon, as chemotherapy can affect healthy cells as well as cancer cells. Report any side effects. Continue your course of treatment even though you feel ill unless your doctor tells you to stop.  In some cases, you may be given additional medicines to  help with side effects. Follow all directions for their use.  Call your doctor or health care professional for advice if you get a fever, chills or sore throat, or other symptoms of a cold or flu. Do not treat yourself. This drug decreases your body's ability to fight infections. Try to avoid being around people who are sick.  This medicine may increase your risk to bruise or bleed. Call your doctor or health care professional if you notice any unusual bleeding.  Be careful brushing and flossing your teeth or using a toothpick because you may get an infection or bleed more easily. If you have any dental work done, tell your dentist you are receiving this medicine.  Avoid taking products that contain aspirin, acetaminophen, ibuprofen, naproxen, or ketoprofen unless instructed by your doctor. These medicines may hide a fever.  Do not become pregnant while taking this medicine. Women should inform their doctor if they wish to become pregnant or think they might be pregnant. There is a potential for serious side effects to an unborn child. Talk to your health care professional or pharmacist for more information. Do not breast-feed an infant while taking this medicine.  Men should inform their doctor if they wish to father a child. This medicine may lower sperm counts.  Do not treat diarrhea with over the counter products. Contact your doctor if you have diarrhea that lasts more than 2 days or if it is severe and watery.  This medicine can make you more sensitive to the sun. Keep out of the sun. If you cannot avoid being in the sun, wear protective clothing and use sunscreen. Do not use sun lamps or tanning beds/booths.  NOTE:This sheet is a summary. It may not cover all possible information. If you have questions about this medicine, talk to your doctor, pharmacist, or health care provider. Copyright© 2017 [employer]        Leucovorin injection  What is this medicine?  LEUCOVORIN (loo koe VOR in) is used to prevent  or treat the harmful effects of some medicines. This medicine is used to treat anemia caused by a low amount of folic acid in the body. It is also used with 5-fluorouracil (5-FU) to treat colon cancer.  How should I use this medicine?  This medicine is for injection into a muscle or into a vein. It is given by a health care professional in a hospital or clinic setting.  Talk to your pediatrician regarding the use of this medicine in children. Special care may be needed.  What side effects may I notice from receiving this medicine?  Side effects that you should report to your doctor or health care professional as soon as possible:  · allergic reactions like skin rash, itching or hives, swelling of the face, lips, or tongue  · breathing problems  · fever, infection  · mouth sores  · unusual bleeding or bruising  · unusually weak or tired  Side effects that usually do not require medical attention (report to your doctor or health care professional if they continue or are bothersome):  · constipation or diarrhea  · loss of appetite  · nausea, vomiting  What may interact with this medicine?  · capecitabine  · fluorouracil  · phenobarbital  · phenytoin  · primidone  · trimethoprim-sulfamethoxazole  What if I miss a dose?  This does not apply.  Where should I keep my medicine?  This drug is given in a hospital or clinic and will not be stored at home.  What should I tell my health care provider before I take this medicine?  They need to know if you have any of these conditions:  · anemia from low levels of vitamin B-12 in the blood  · an unusual or allergic reaction to leucovorin, folic acid, other medicines, foods, dyes, or preservatives  · pregnant or trying to get pregnant  · breast-feeding  What should I watch for while using this medicine?  Your condition will be monitored carefully while you are receiving this medicine.  This medicine may increase the side effects of 5-fluorouracil, 5-FU. Tell your doctor or health  care professional if you have diarrhea or mouth sores that do not get better or that get worse.  NOTE:This sheet is a summary. It may not cover all possible information. If you have questions about this medicine, talk to your doctor, pharmacist, or health care provider. Copyright© 2017 [employer]        Oxaliplatin Injection  What is this medicine?  OXALIPLATIN (ox AL i STEPHANIE tin) is a chemotherapy drug. It targets fast dividing cells, like cancer cells, and causes these cells to die. This medicine is used to treat cancers of the colon and rectum, and many other cancers.  How should I use this medicine?  This drug is given as an infusion into a vein. It is administered in a hospital or clinic by a specially trained health care professional.  Talk to your pediatrician regarding the use of this medicine in children. Special care may be needed.  What side effects may I notice from receiving this medicine?  Side effects that you should report to your doctor or health care professional as soon as possible:  · allergic reactions like skin rash, itching or hives, swelling of the face, lips, or tongue  · low blood counts - This drug may decrease the number of white blood cells, red blood cells and platelets. You may be at increased risk for infections and bleeding.  · signs of infection - fever or chills, cough, sore throat, pain or difficulty passing urine  · signs of decreased platelets or bleeding - bruising, pinpoint red spots on the skin, black, tarry stools, nosebleeds  · signs of decreased red blood cells - unusually weak or tired, fainting spells, lightheadedness  · breathing problems  · chest pain, pressure  · cough  · diarrhea  · jaw tightness  · mouth sores  · nausea and vomiting  · pain, swelling, redness or irritation at the injection site  · pain, tingling, numbness in the hands or feet  · problems with balance, talking, walking  · redness, blistering, peeling or loosening of the skin, including inside the  mouth  · trouble passing urine or change in the amount of urine  Side effects that usually do not require medical attention (report to your doctor or health care professional if they continue or are bothersome):  · changes in vision  · constipation  · hair loss  · loss of appetite  · metallic taste in the mouth or changes in taste  · stomach pain  What may interact with this medicine?  · medicines to increase blood counts like filgrastim, pegfilgrastim, sargramostim  · probenecid  · some antibiotics like amikacin, gentamicin, neomycin, polymyxin B, streptomycin, tobramycin  · zalcitabine  Talk to your doctor or health care professional before taking any of these medicines:  · acetaminophen  · aspirin  · ibuprofen  · ketoprofen  · naproxen  What if I miss a dose?  It is important not to miss a dose. Call your doctor or health care professional if you are unable to keep an appointment.  Where should I keep my medicine?  This drug is given in a hospital or clinic and will not be stored at home.  What should I tell my health care provider before I take this medicine?  They need to know if you have any of these conditions:  · kidney disease  · an unusual or allergic reaction to oxaliplatin, other chemotherapy, other medicines, foods, dyes, or preservatives  · pregnant or trying to get pregnant  · breast-feeding  What should I watch for while using this medicine?  Your condition will be monitored carefully while you are receiving this medicine. You will need important blood work done while you are taking this medicine.  This medicine can make you more sensitive to cold. Do not drink cold drinks or use ice. Cover exposed skin before coming in contact with cold temperatures or cold objects. When out in cold weather wear warm clothing and cover your mouth and nose to warm the air that goes into your lungs. Tell your doctor if you get sensitive to the cold.  This drug may make you feel generally unwell. This is not uncommon,  as chemotherapy can affect healthy cells as well as cancer cells. Report any side effects. Continue your course of treatment even though you feel ill unless your doctor tells you to stop.  In some cases, you may be given additional medicines to help with side effects. Follow all directions for their use.  Call your doctor or health care professional for advice if you get a fever, chills or sore throat, or other symptoms of a cold or flu. Do not treat yourself. This drug decreases your body's ability to fight infections. Try to avoid being around people who are sick.  This medicine may increase your risk to bruise or bleed. Call your doctor or health care professional if you notice any unusual bleeding.  Be careful brushing and flossing your teeth or using a toothpick because you may get an infection or bleed more easily. If you have any dental work done, tell your dentist you are receiving this medicine.  Avoid taking products that contain aspirin, acetaminophen, ibuprofen, naproxen, or ketoprofen unless instructed by your doctor. These medicines may hide a fever.  Do not become pregnant while taking this medicine. Women should inform their doctor if they wish to become pregnant or think they might be pregnant. There is a potential for serious side effects to an unborn child. Talk to your health care professional or pharmacist for more information. Do not breast-feed an infant while taking this medicine.  Call your doctor or health care professional if you get diarrhea. Do not treat yourself.  NOTE:This sheet is a summary. It may not cover all possible information. If you have questions about this medicine, talk to your doctor, pharmacist, or health care provider. Copyright© 2017 [employer]      Discharge Instructions for Chemotherapy  Your healthcare provider prescribed a type of medicine therapy for you called chemotherapy. Healthcare providers prescribe chemotherapy for many different types of illnesses,  including cancer. There are many types of chemotherapy. This sheet provides general guidelines on how you can take care of yourself after your chemotherapy.  Mouth care  Dont be discouraged if you get mouth sores, even if you are following all your healthcare providers instructions. Many people get mouth sores as a side effect of chemotherapy. Heres what you can do to prevent mouth sores:  · Keep your mouth clean. Brush your teeth with a soft-bristle toothbrush after every meal.  · Ask if you should use a toothpaste with fluoride, or a mixture of 1 teaspoon of salt in 8-ounces of water to brush your teeth.   · Use an oral swab or special soft toothbrush if your gums bleed during regular brushing.  · Don't use dental floss if it causes your gums to bleed.  · Use any mouthwashes given to you as directed.  · If you cant tolerate regular methods, use salt and baking soda to clean your mouth. Mix 1 teaspoon of salt and 1 teaspoon of baking soda into an 8-ounce glass of warm water. Swish and spit.  · If you wear dentures, you may be told to wear them only when you eat, ask your healthcare provider. Clean dentures twice a day and soak in antimicrobial solution when you aren't wearing them. Rinse your mouth after each meal.   · Watch your mouth and tongue for white patches. This may be a sign of a type of yeast infection (thrush), a common side effect of chemotherapy. Be sure to tell your healthcare provider about these patches. Medicine can be prescribed to treat it.  Other home care  Here's what else you can do:  · Try to exercise. Exercise keeps you strong and keeps your heart and lungs active. Walking and yoga are good types of exercise.   · Keep clean. During chemotherapy, your body cant fight infection very well. Take short baths or showers.  ¨ Wash your hands before you eat and after going to the bathroom.  ¨ Use moisturizing soap. Chemotherapy can make your skin dry.  ¨ Apply moisturizing lotion several times a  day to help relieve dry skin.  ¨ Dont take very hot or very cold showers or baths.  · Dont be surprised if your chemotherapy causes slight burns to your skin--usually on the hands and feet. Some medicines used in high doses cause this to happen. Ask for a special cream to help relieve the burn and protect your skin.  · Avoid people who are sick with illnesses and diseases you could catch, such as colds, flu, measles, or chicken pox as well as people who have recently had vaccinations for these illnesses.   · Let your healthcare provider know if your throat is sore. You may have an infection that needs treatment.  · Remember, many patients feel sick and lose their appetites during treatment. Eat small meals several times a day to keep your strength up:  ¨ Choose bland foods with little taste or smell if you are reacting strongly to food.  ¨ Be sure to cook all food thoroughly. This kills bacteria and helps you avoid infection.  ¨ Eat foods that are soft. Soft foods are less likely to cause stomach irritation.  ¨ Try to eat a variety of foods for a well-balanced diet. Drink plenty of fluids and eat foods with fiber to avoid constipation.      When to call your healthcare provider  Call your healthcare provider right away if you have any of the following:  · Unexplained bleeding  · Trouble concentrating  · Ongoing fatigue  · Shortness of breath, wheezing, trouble breathing, or bad cough  · Rapid, irregular heartbeat, or chest pain  · Dizziness, lightheadedness  · Constant feeling of being cold  · Hives or a cut or rash that swells, turns red, feels hot or painful, or begins to ooze  · Burning when you urinate  · Fever of 100.4°F (38°C) or higher, or chills   Date Last Reviewed: 5/1/2016  © 4267-5952 [employer]. [address] [place] [address], [place] [address]. All rights reserved. This information is not intended as a substitute for professional medical care. Always follow your healthcare professional's  instructions.        Mouth Care During Chemotherapy     Brush gently with an extra soft toothbrush and mild toothpaste.     Mouth sores (stomatitis) and dry mouth are common side effects of chemotherapy and radiation therapy. These side effects occur because these treatments affect normal cells as well as cancer cells. Using the tips on this handout may help you feel better.   Remedies that help  · Rinse with 1/2 teaspoon baking soda and 1/4 teaspoon salt mixed in 1 cup of warm water. This helps keep your mouth free of germs.  · Use products that coat and protect the mouth and throat. Or use medications that coat and soothe mouth sores themselves.  · Numb your mouth and throat with special sprays or lozenges to make eating easier.  Prevent mouth sores  · Buy an extra soft toothbrush and mild toothpaste.  · Gently brush your teeth and gums.  · Have your dentist treat any dental problems before your therapy begins.  Moisten a dry mouth  · Drink plenty of water. Take frequent sips or suck on ice chips.  · Suck on sugar-free candy and lozenges. Chew sugar-free gum.  · Use products that moisten the mouth if your doctor prescribes them.  · Apply lip balm to help prevent dry lips.  · Avoid mouthwash that contains alcohol.  Choose foods less likely to irritate  Try foods that are:  · Soft and go down smoothly, such as a milkshake or food puréed with a   · Served cold or at room temperature  · Cooked until tender and cut into small pieces  Avoid foods that are:  · Sharp or crunchy  · Hot, salty, or spicy  · Acidic, such as citrus juices  When to seek medical advice  · You develop mouth sores  · Mouth pain keeps you from eating or resting   Date Last Reviewed: 1/5/2016  © 1846-9470 [employer]. [address] [place] [address], [place] [address]. All rights reserved. This information is not intended as a substitute for professional medical care. Always follow your healthcare professional's  instructions.        Nutrition During Chemotherapy     Drink plenty of liquids, such as water.     During chemotherapy, the energy provided by a healthy diet can help you rebuild normal cells. It can also help you keep up your strength and fight infection. As a result, you may feel better and be more able to cope with side effects. Ask your doctor about your nutrition needs.  Drink plenty of fluids  · Fluids help the body produce urine and decrease constipation. They help prevent kidney and bladder problems. They also help replace fluids lost from vomiting and diarrhea.  · Try water, unsweetened juices, and other flavored drinks without caffeine. They flush toxins from the body.  Get enough calories  · Calories are fuel. The body uses this fuel to perform all of its functions, including healing.  · Its OK to be lean, but be sure you are not underweight. If you are, try eating more calories.  · Eat calorie-dense foods such as avocados, peanut butter, eggs, and ice cream.  · If you need extra calories, add butter, gravy, and sauces to foods (if tolerated).  · If you don't need the extra calories, try to limit foods that are fried, greasy, or high in fat or added sugar.  Eat protein, fruits, and vegetables  · Protein builds muscle, bone, skin, and blood. It helps your body heal and fight infection. It also helps boost your energy level.  · Good protein choices include yogurt, eggs, chicken, lean meats, beans, and peanut butter.  · Fruits and vegetables are full of important vitamins, minerals, and fiber to help your body function properly.  · Try to eat a variety of vegetables, fruits, whole grains, and beans.  · Ask your doctor about instant protein powder or other supplements.  Eating right during treatment  Side effects may make it a little harder to eat well on some days. The following tips will help you continue to get the nutrition you need:  · Be open to new foods and recipes.  · Eat small portions often and  slowly.  · Have a healthy snack instead of a meal if you are not very hungry.  · Try eating in a new setting.  · Physical activity, such as walking, can help increase your appetite. Try to be active for at least 30 minutes each day.  · Boost your diet by getting the vitamins and minerals you need from fruits, vegetables, and whole grains.  · If you live alone and are not up to cooking, ask your healthcare provider about Meals on Wheels or other outreach programs.  · Sometimes, it is best to follow your appetitie. Eat when you are hungry, but when you ar enot, forcing yourself to eat can make you feel bad, nauseated, or even cause you to vomit.   Date Last Reviewed: 1/6/2016 © 2000-2017 [employer]. [address] [place] [address], [place] [address]. All rights reserved. This information is not intended as a substitute for professional medical care. Always follow your healthcare professional's instructions.        Chemotherapy: Common Questions About Activity During Treatment     Working from home may be an option.   You may have questions about how chemotherapy could affect the things you take for granted in everyday life. Here are some answers to common questions, and some of the adjustments you may need to make.  Will I still be able to work?  Many people do still work during chemotherapy. If you find you have less energy, you may need to talk with your employer about adjusting your schedule:  · Work at home or reduce the number of hours you work.  · Plan time off that fits best with your treatment cycle.  Should I exercise?  Ask your healthcare provider about starting an exercise program. It may help you sleep better and sometimes even helps control your appetite. It is also good for your sense of well-being:  · Exercise when you feel most energetic.  · Keep the pace moderate. Even small amounts of exercise can help. Instead of jogging, walk or ride a stationary bike.  Will I be affected  sexually?  Chemotherapy can cause sexual changes in men and women:  · You may notice changes in your desire to have sex. Hugging and cuddling may seem more important now.  · Chemotherapy can cause short-term or permanent infertility. Talk to your healthcare provider if you are planning on having children. Men may want to bank, or freeze, sperm.  · Most chemotherapy drugs can cause birth defects if taken during pregnancy. Talk to your healthcare provider about whether you need to use birth control throughout treatment.  Date Last Reviewed: 12/27/2015 © 2000-2017 [employer]. [address] [place] [address] [address]97. All rights reserved. This information is not intended as a substitute for professional medical care. Always follow your healthcare professional's instructions.      Take Zofran or phenergan every 6 hours as needed for nausea and or vomiting.  Call if more than 3 loose stools per day. Take imodium as needed for diarrhea.  Use baking soda and salt mixed with water (50%) mouth wash (gurgle and spit) atleast 3 times a day.   Call if you have mouth sores, fever more than 100.4 lasting over 1 hour or a single temperature over 101 F      Imodium  Diarrhea: take 2 tablets after first loose stool, followed by 1 tablet after each loose stool, up to 8 tablets/day

## 2019-03-22 NOTE — PLAN OF CARE
START ON PATHWAY REGIMEN - Colorectal    Clinical Trial: Trial 2018.073: [id number] Randomized Phase III Trial of Standard   Chemotherapy Alone or Combined with Atezolizumab as Adjuvant Therapy for   Patients with Stage III Colon Cancer and Deficient DNA Mismatch Repair    Patient Characteristics:  Colon Adjuvant, Stage III, Low Risk (T1-3, N1)  Current evidence of distant metastases<= No  AJCC T Category: T3  AJCC N Category: N1  AJCC M Category: M0  AJCC 8 Stage Grouping: IIIB  Intent of Therapy:  Curative Intent, Discussed with Patient

## 2019-03-22 NOTE — LETTER
March 22, 2019      Zachery Augustin MD  [address]  [facility] [address]           [facility] Hematology Oncology  [address]  [facility] [address]  Phone: [phone]          Patient: Irene Galo   MR Number: [id number]   YOB: 1965   Date of Visit: 3/22/2019       Dear Dr. Zachery Augustin:    Thank you for referring Irene Galo to me for evaluation. Attached you will find relevant portions of my assessment and plan of care.    If you have questions, please do not hesitate to call me. I look forward to following Irene Galo along with you.    Sincerely,    Sheila Welch MD    Enclosure  CC:  No Recipients    If you would like to receive this communication electronically, please contact [email or web address] or [phone] to request more information on [employer] Link access.    For providers and/or their staff who would like to refer a patient to [facility], please contact us through our one-stop-shop provider referral line, [facility], at [phone].    If you feel you have received this communication in error or would no longer like to receive these types of communications, please e-mail [email or web address]

## 2019-03-22 NOTE — PROGRESS NOTES
REASON FOR VISIT:  New diagnosis of colon cancer.    REFERRING PHYSICIAN:  Zachery Augustin M.D. with Colon and Rectal Surgery.    HISTORY OF PRESENT ILLNESS:  Ms. Irene Galo is a 53-year-old female who   underwent screening colonoscopy and was noted to have a circumferential   partially obstructing mass in the rectosigmoid, which could not be traversed   with the scope.  Inferior edge was 18 to 19 cm from the anal verge.  This was   done on 01/10/2019.  Pathology from that revealed moderate dysplasia.    Malignancy could not be excluded.  She underwent a CT scan on 01/15/2019 that   revealed a focal area of thickening in the rectosigmoid colon measuring 1.9 cm   in length, which could possibly correspond to the abnormality seen on recent   colonoscopy.  There is some  slight prominent lymph node superior to this   region of thickening along the course of the inferior mesenteric vessels   measuring approximately 6.5 mm.  CEA was 3.3.  She underwent sigmoid colectomy   on 02/15/2019.  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  She   comes in to discuss adjuvant treatment.  She has recovered well from her chemo.    She has no particular complaints at this time.  She is accompanied to the   clinic today with her .    REVIEW OF SYSTEMS:  CONSTITUTIONAL:  Negative for appetite change, fatigue and unexpected weight   change.  HEENT:  Negative for mouth sores.  EYES:  Negative for visual disturbance.  RESPIRATORY:  Negative for cough and shortness of breath.  CARDIOVASCULAR:  Negative for chest pain.  GASTROINTESTINAL:  Negative for diarrhea.  GENITOURINARY:  Negative for frequency.  MUSCULOSKELETAL:  Negative for back pain.  SKIN:  Negative for rash.  NEUROLOGIC:  Negative for headaches.  HEMATOLOGIC:  Negative for adenopathy.  PSYCHIATRIC AND BEHAVIOR:  Not nervous or anxious.    COMORBID MEDICAL CONDITIONS:  Include GERD, uterine fibroids.    PAST SURGICAL HISTORY:  Stapedectomy, robotic-assisted  laparoscopic resection of   the colon.    FAMILY HISTORY:  Father  with lung cancer, paternal grandmother with   breast cancer at age 70.    SOCIAL HISTORY:  Does not smoke.  Drinks alcohol occasionally.    PHYSICAL EXAMINATION:  VITAL SIGNS:  Reviewed in EPIC.  GENERAL:  The patient is oriented to person, place and time, appears moderately   built, moderately nourished, in no acute distress.  HEENT:  Right and left ears are normal.  Oropharynx is clear and moist.  No   pharyngeal exudate.  Teeth, gums and lips are normal.  No sinus tenderness.    Palate and tongue, posterior pharynx are normal.  EYES:  Conjunctivae and lids are normal.  NECK:  Supple.  No JVD.  No thyromegaly.  CARDIOVASCULAR:  Normal rate, regular rhythm.  Normal heart sounds.  Intact   distal pulses.  No murmurs heard.  No edema or tenderness in the extremities.  PULMONARY AND CHEST:  Bilateral breath sounds heard.  No rales, rhonchi or   wheezes.  ABDOMEN:  Soft, nontender, nondistended.  Well-healed surgical scars noted.  MUSCULOSKELETAL:  Normal range of motion.  Gait is normal.  No clubbing or   cyanosis.  LYMPHADENOPATHY:  No submental, submandibular, cervical, supraclavicular lymph   nodes noted.  NEUROLOGIC:  Alert and oriented to person, place and time.  Normal sense, normal   reflexes.  No sensory deficit.  Gait is normal.  SKIN:  Warm, dry and intact.  No bruising, no lesions, no rashes.  Nail show no   clubbing.  PSYCHIATRIC:  Normal mood and affect.  Speech, behavior, judgment, thought   content, cognition and memory are normal.    LABORATORY DATA:  From 2019 reveals a CBC with hemoglobin of 11.9,   hematocrit 36.6, WBC 14.12.  BMP is within normal limits.    IMAGING AND PATHOLOGY:  As discussed above.    ASSESSMENT AND PLAN:  Ms. Irene Galo is a 53-year-old female with a new   diagnosis of rectosigmoid colon cancer, pathologic T3 N1 M0.  She requires   adjuvant chemotherapy.  We discussed FOLFOX.  We briefly evaluated  her for the   [id number] clinical trial with FOLFOX and Atezolizumab; however, that is only for   purely colon cancer since she does not qualify.  So, she will proceed with   standard chemotherapy.  We will avoid an obtained CT scans today for completion   of staging and port placement and she will begin treatment as soon as the above   is completed.  The rationale for this was discussed with the patient and her   accompanying .  Distress score is 0 and no intervention is indicated.        SPS/HN  dd: 03/22/2019 15:01:07 (CDT)  td: 03/23/2019 03:21:39 (CDT)  Doc ID   #[id number]  Job ID #[id number]    CC:        Distress Score    Distress Score: 0        Practical Problems Physical Problems   : No Appearance: No   Housing: No Bathing / Dressing: No   Insurance / Financial: No Breathing: No    Transportation: No  Changes in Urination: No    Work / School: No  Constipation: No   Treatment Decisions: No  Diarrhea: No     Eating: No    Family Problems Fatigue: No    Dealing with Children: No Feeling Swollen: No    Dealing with Partner: No Fevers: No    Ability to Have Children: No  Getting Around: No    Family Health Issues: No  Indigestion: No     Memory / Concentration: No   Emotional Problems Mouth Sores: No    Depression: No  Nausea: No    Fears: No  Nose Dry / Congested: No    Nervousness: No  Pain: No    Sadness: No Sexual: No    Worry: No Skin Dry / Itchy: No    Loss of Interest in Usual Activities: No Sleep: No     Substance Abuse: No    Spiritual/Religions Concerns Tingling in Hands / Feet: No   Spritual / Taoist Concerns: No         Other Problems            Dictated: [id number]

## 2019-03-22 NOTE — TELEPHONE ENCOUNTER
----- Message from Sheila Welch MD sent at 3/22/2019 11:51 AM CDT -----  Schedule general surgery ASAP need chest PORT early next week as she has to start FOLFOX ASAP    Needs CT chest, Abdomen/pelvis ASAP in [place] today    Schedule CBC,CMP, and see me to start FOLFOX next week as she is 6 weeks from Surgery. DC pump on day 3 in [place]

## 2019-03-22 NOTE — Clinical Note
Schedule general surgery ASAP need chest PORT early next week as she has to start FOLFOX ASAPNeeds CT chest, Abdomen/pelvis ASAP in [place] todaySchedule CBC,CMP, and see me to start FOLFOX next week as she is 6 weeks from Surgery. DC pump on day 3 in [place]

## 2019-03-22 NOTE — PLAN OF CARE
START ON PATHWAY REGIMEN - Colorectal    COS67        Oxaliplatin (Eloxatin(R))       Leucovorin       5-Fluorouracil       5-Fluorouracil           Additional Orders: **Note: order sheet contains two q14 day cycles**    **Always confirm dose/schedule in your pharmacy ordering system**        Patient Characteristics:  Colon Adjuvant, Stage III, Low Risk (T1-3, N1)  Current evidence of distant metastases<= No  AJCC T Category: T3  AJCC N Category: N1  AJCC M Category: M0  AJCC 8 Stage Grouping: IIIB  Intent of Therapy:  Curative Intent, Discussed with Patient

## 2019-03-25 ENCOUNTER — HOSPITAL ENCOUNTER (OUTPATIENT)
Dept: RADIOLOGY | Facility: HOSPITAL | Age: 54
Discharge: HOME | End: 2019-03-25
Attending: OBSTETRICS & GYNECOLOGY
Payer: COMMERCIAL

## 2019-03-25 ENCOUNTER — OFFICE VISIT (OUTPATIENT)
Dept: SURGERY | Facility: CLINIC | Age: 54
End: 2019-03-25
Payer: COMMERCIAL

## 2019-03-25 VITALS
HEIGHT: 62 IN | WEIGHT: 178 LBS | DIASTOLIC BLOOD PRESSURE: 76 MMHG | HEART RATE: 68 BPM | BODY MASS INDEX: 32.76 KG/M2 | SYSTOLIC BLOOD PRESSURE: 142 MMHG

## 2019-03-25 VITALS — WEIGHT: 179 LBS | BODY MASS INDEX: 32.94 KG/M2 | HEIGHT: 62 IN

## 2019-03-25 DIAGNOSIS — Z12.39 SCREENING FOR BREAST CANCER: ICD-10-CM

## 2019-03-25 DIAGNOSIS — C18.7 MALIGNANT NEOPLASM OF SIGMOID COLON: Primary | ICD-10-CM

## 2019-03-25 PROCEDURE — 3008F PR BODY MASS INDEX (BMI) DOCUMENTED: ICD-10-PCS | Mod: CPTII,S$GLB,, | Performed by: SURGERY

## 2019-03-25 PROCEDURE — 99204 OFFICE O/P NEW MOD 45 MIN: CPT | Mod: S$GLB,,, | Performed by: SURGERY

## 2019-03-25 PROCEDURE — 99999 PR PBB SHADOW E&M-EST. PATIENT-LVL IV: ICD-10-PCS | Mod: PBBFAC,,, | Performed by: SURGERY

## 2019-03-25 PROCEDURE — 77067 SCR MAMMO BI INCL CAD: CPT | Mod: TC

## 2019-03-25 PROCEDURE — 77063 MAMMO DIGITAL SCREENING BILAT WITH TOMOSYNTHESIS_CAD: ICD-10-PCS | Mod: 26,,, | Performed by: RADIOLOGY

## 2019-03-25 PROCEDURE — 99204 PR OFFICE/OUTPT VISIT, NEW, LEVL IV, 45-59 MIN: ICD-10-PCS | Mod: S$GLB,,, | Performed by: SURGERY

## 2019-03-25 PROCEDURE — 77067 SCR MAMMO BI INCL CAD: CPT | Mod: 26,,, | Performed by: RADIOLOGY

## 2019-03-25 PROCEDURE — 77063 BREAST TOMOSYNTHESIS BI: CPT | Mod: 26,,, | Performed by: RADIOLOGY

## 2019-03-25 PROCEDURE — 3008F BODY MASS INDEX DOCD: CPT | Mod: CPTII,S$GLB,, | Performed by: SURGERY

## 2019-03-25 PROCEDURE — 77067 MAMMO DIGITAL SCREENING BILAT WITH TOMOSYNTHESIS_CAD: ICD-10-PCS | Mod: 26,,, | Performed by: RADIOLOGY

## 2019-03-25 PROCEDURE — 99999 PR PBB SHADOW E&M-EST. PATIENT-LVL IV: CPT | Mod: PBBFAC,,, | Performed by: SURGERY

## 2019-03-25 NOTE — LETTER
March 25, 2019      Sheila Welch MD  [address]  [place] [address]           [facility] - General Surgery  [address]  [place] [address]  Phone: [phone]          Patient: Irene Galo   MR Number: [id number]   YOB: 1965   Date of Visit: 3/25/2019       Dear Dr. Sheila Welch:    Thank you for referring Irene Galo to me for evaluation. Attached you will find relevant portions of my assessment and plan of care.    If you have questions, please do not hesitate to call me. I look forward to following Irene Galo along with you.    Sincerely,    Vladimir Bernal Jr., MD    Enclosure  CC:  No Recipients    If you would like to receive this communication electronically, please contact [email or web address] or [phone] to request more information on [employer] Link access.    For providers and/or their staff who would like to refer a patient to [facility], please contact us through our one-stop-shop provider referral line, [facility], at [phone].    If you feel you have received this communication in error or would no longer like to receive these types of communications, please e-mail [email or web address]

## 2019-03-25 NOTE — PROGRESS NOTES
History & Physical  Surgery      SUBJECTIVE:     Chief Complaint/Reason for Admission: Port Placement    History of Present Illness: Irene Galo is a 53 y.o. female with hx of pT3 N1a moderately differentiated adenocarcinoma of the colon presents to clinic for evaluation for port placement. She is to undergo FOLFOX therapy per her oncologist. She has never had a central line placed. No issues with easy bleeding or bruising.       Current Outpatient Medications on File Prior to Visit   Medication Sig    cetirizine (ZYRTEC) 10 MG tablet Take 10 mg by mouth every evening.     famotidine (PEPCID) 20 MG tablet Take 20 mg by mouth daily as needed for Heartburn (acid reflux).    LO LOESTRIN FE 1 mg-10 mcg (24)/10 mcg (2) Tab Take 1 tablet by mouth once daily.    multivitamin with minerals tablet Take 1 tablet by mouth once daily.    ondansetron (ZOFRAN-ODT) 8 MG TbDL Take 1 tablet (8 mg total) by mouth every 6 (six) hours as needed.    promethazine (PHENERGAN) 25 MG tablet Take 1 tablet (25 mg total) by mouth every 6 (six) hours as needed for Nausea.     No current facility-administered medications on file prior to visit.        Review of patient's allergies indicates:   Allergen Reactions    No known drug allergies        Past Medical History:   Diagnosis Date    Allergy     Bilateral hearing loss     Colon cancer     GERD (gastroesophageal reflux disease)     Uterine fibroid      Past Surgical History:   Procedure Laterality Date    COLONOSCOPY WITH BIOPSY N/A 1/10/2019    [facility]    lasix  2000    both eyes    MOBILIZATION, SPLENIC FLEXURE  2/15/2019    Performed by Z[facility]y Augustin MD at [facility] FLR    STAPEDECTOMY  2009    STAPEDECTOMY Right 6/6/2013    Performed by Felix Azevedo MD at [facility] FLR    XI ROBOTIC COLECTOMY, SIGMOID N/A 2/15/2019    [facility]     Family History   Problem Relation Age of Onset     Diverticulitis Father     Lung cancer Father     Breast cancer Paternal Grandmother 70    Heart disease Brother 45        Tobacco smoker     Diabetes Brother     Diabetes Brother      Social History     Tobacco Use    Smoking status: Never Smoker    Smokeless tobacco: Never Used   Substance Use Topics    Alcohol use: Yes     Comment: 1 glass of wine , 2-3 times a week    Drug use: No        Review of Systems   Constitutional: Positive for appetite change (decreased) and unexpected weight change. Negative for fatigue.   HENT: Negative for congestion, sore throat and trouble swallowing.    Respiratory: Negative for shortness of breath and wheezing.    Cardiovascular: Negative for chest pain and palpitations.   Gastrointestinal: Positive for abdominal distention (mild). Negative for abdominal pain, constipation and diarrhea.   Genitourinary: Negative for difficulty urinating and frequency.   Musculoskeletal: Negative for arthralgias and myalgias.   Skin: Negative for color change and wound.   Neurological: Negative for light-headedness and headaches.   Hematological: Negative for adenopathy. Does not bruise/bleed easily.   Psychiatric/Behavioral: Negative for confusion and hallucinations.     OBJECTIVE:     Vital Signs (Most Recent)  Pulse: 68 (03/25/19 1316)  BP: (!) 142/76 (03/25/19 1316)    Physical Exam   Constitutional: She is oriented to person, place, and time. She appears well-developed and well-nourished. No distress.   HENT:   Head: Normocephalic and atraumatic.   Eyes: Conjunctivae and EOM are normal.   Neck: Normal range of motion. Neck supple.   Cardiovascular: Normal rate, regular rhythm and intact distal pulses.   Pulmonary/Chest: Effort normal and breath sounds normal. No respiratory distress.   Abdominal: Soft. She exhibits no distension. There is no tenderness.   Musculoskeletal: Normal range of motion. She exhibits no edema.   Neurological: She is alert and oriented to person, place, and  time.   Skin: Skin is warm and dry.   Psychiatric: She has a normal mood and affect. Thought content normal.         ASSESSMENT/PLAN:     A/P:  Colon cancer    1. TO OR for port placement  2. Consent obtained

## 2019-03-25 NOTE — H&P (VIEW-ONLY)
History & Physical  Surgery      SUBJECTIVE:     Chief Complaint/Reason for Admission: Port Placement    History of Present Illness: Irene Galo is a 53 y.o. female with hx of pT3 N1a moderately differentiated adenocarcinoma of the colon presents to clinic for evaluation for port placement. She is to undergo FOLFOX therapy per her oncologist. She has never had a central line placed. No issues with easy bleeding or bruising.       Current Outpatient Medications on File Prior to Visit   Medication Sig    cetirizine (ZYRTEC) 10 MG tablet Take 10 mg by mouth every evening.     famotidine (PEPCID) 20 MG tablet Take 20 mg by mouth daily as needed for Heartburn (acid reflux).    LO LOESTRIN FE 1 mg-10 mcg (24)/10 mcg (2) Tab Take 1 tablet by mouth once daily.    multivitamin with minerals tablet Take 1 tablet by mouth once daily.    ondansetron (ZOFRAN-ODT) 8 MG TbDL Take 1 tablet (8 mg total) by mouth every 6 (six) hours as needed.    promethazine (PHENERGAN) 25 MG tablet Take 1 tablet (25 mg total) by mouth every 6 (six) hours as needed for Nausea.     No current facility-administered medications on file prior to visit.        Review of patient's allergies indicates:   Allergen Reactions    No known drug allergies        Past Medical History:   Diagnosis Date    Allergy     Bilateral hearing loss     Colon cancer     GERD (gastroesophageal reflux disease)     Uterine fibroid      Past Surgical History:   Procedure Laterality Date    COLONOSCOPY WITH BIOPSY N/A 1/10/2019    [facility] ENDO    lasix  2000    both eyes    MOBILIZATION, SPLENIC FLEXURE  2/15/2019    Performed [facility]    STAPEDECTOMY  2009    STAPEDECTOMY Right 6/6/2013    Performed by Felix [facility]edo MD at [facility]LR    XI ROBOTIC COLECTOMY, SIGMOID N/A 2/15/2019    [facility]     Family History   Problem Relation Age of Onset     Diverticulitis Father     Lung cancer Father     Breast cancer Paternal Grandmother 70    Heart disease Brother 45        Tobacco smoker     Diabetes Brother     Diabetes Brother      Social History     Tobacco Use    Smoking status: Never Smoker    Smokeless tobacco: Never Used   Substance Use Topics    Alcohol use: Yes     Comment: 1 glass of wine , 2-3 times a week    Drug use: No        Review of Systems   Constitutional: Positive for appetite change (decreased) and unexpected weight change. Negative for fatigue.   HENT: Negative for congestion, sore throat and trouble swallowing.    Respiratory: Negative for shortness of breath and wheezing.    Cardiovascular: Negative for chest pain and palpitations.   Gastrointestinal: Positive for abdominal distention (mild). Negative for abdominal pain, constipation and diarrhea.   Genitourinary: Negative for difficulty urinating and frequency.   Musculoskeletal: Negative for arthralgias and myalgias.   Skin: Negative for color change and wound.   Neurological: Negative for light-headedness and headaches.   Hematological: Negative for adenopathy. Does not bruise/bleed easily.   Psychiatric/Behavioral: Negative for confusion and hallucinations.     OBJECTIVE:     Vital Signs (Most Recent)  Pulse: 68 (03/25/19 1316)  BP: (!) 142/76 (03/25/19 1316)    Physical Exam   Constitutional: She is oriented to person, place, and time. She appears well-developed and well-nourished. No distress.   HENT:   Head: Normocephalic and atraumatic.   Eyes: Conjunctivae and EOM are normal.   Neck: Normal range of motion. Neck supple.   Cardiovascular: Normal rate, regular rhythm and intact distal pulses.   Pulmonary/Chest: Effort normal and breath sounds normal. No respiratory distress.   Abdominal: Soft. She exhibits no distension. There is no tenderness.   Musculoskeletal: Normal range of motion. She exhibits no edema.   Neurological: She is alert and oriented to person, place, and  time.   Skin: Skin is warm and dry.   Psychiatric: She has a normal mood and affect. Thought content normal.         ASSESSMENT/PLAN:     A/P:  Colon cancer    1. TO OR for port placement  2. Consent obtained

## 2019-03-27 ENCOUNTER — TELEPHONE (OUTPATIENT)
Dept: SURGERY | Facility: CLINIC | Age: 54
End: 2019-03-27

## 2019-03-28 ENCOUNTER — ANESTHESIA EVENT (OUTPATIENT)
Dept: SURGERY | Facility: HOSPITAL | Age: 54
End: 2019-03-28
Payer: COMMERCIAL

## 2019-03-28 ENCOUNTER — HOSPITAL ENCOUNTER (OUTPATIENT)
Facility: HOSPITAL | Age: 54
Discharge: HOME | End: 2019-03-28
Attending: SURGERY | Admitting: SURGERY
Payer: COMMERCIAL

## 2019-03-28 ENCOUNTER — ANESTHESIA (OUTPATIENT)
Dept: SURGERY | Facility: HOSPITAL | Age: 54
End: 2019-03-28
Payer: COMMERCIAL

## 2019-03-28 VITALS
OXYGEN SATURATION: 99 % | HEART RATE: 59 BPM | BODY MASS INDEX: 32.76 KG/M2 | HEIGHT: 62 IN | RESPIRATION RATE: 18 BRPM | WEIGHT: 178 LBS | SYSTOLIC BLOOD PRESSURE: 120 MMHG | DIASTOLIC BLOOD PRESSURE: 84 MMHG | TEMPERATURE: 98 F

## 2019-03-28 DIAGNOSIS — C18.9 COLON CANCER: ICD-10-CM

## 2019-03-28 DIAGNOSIS — C18.7 MALIGNANT NEOPLASM OF SIGMOID COLON: Primary | ICD-10-CM

## 2019-03-28 PROCEDURE — 37000009 HC ANESTHESIA EA ADD 15 MINS: Performed by: SURGERY

## 2019-03-28 PROCEDURE — 36561 INSERT TUNNELED CV CATH: CPT | Mod: LT,,, | Performed by: SURGERY

## 2019-03-28 PROCEDURE — 63600175 PHARM REV CODE 636 W HCPCS: Performed by: SURGERY

## 2019-03-28 PROCEDURE — 37000008 HC ANESTHESIA 1ST 15 MINUTES: Performed by: SURGERY

## 2019-03-28 PROCEDURE — 63600175 PHARM REV CODE 636 W HCPCS: Performed by: NURSE ANESTHETIST, CERTIFIED REGISTERED

## 2019-03-28 PROCEDURE — 77001 CHG FLUOROGUIDE CNTRL VEN ACCESS,PLACE,REPLACE,REMOVE: ICD-10-PCS | Mod: 26,,, | Performed by: SURGERY

## 2019-03-28 PROCEDURE — 25000003 PHARM REV CODE 250: Performed by: SURGERY

## 2019-03-28 PROCEDURE — C1788 PORT, INDWELLING, IMP: HCPCS | Performed by: SURGERY

## 2019-03-28 PROCEDURE — 25000003 PHARM REV CODE 250: Performed by: NURSE ANESTHETIST, CERTIFIED REGISTERED

## 2019-03-28 PROCEDURE — 36000707: Performed by: SURGERY

## 2019-03-28 PROCEDURE — D9220A PRA ANESTHESIA: ICD-10-PCS | Mod: CRNA,,, | Performed by: NURSE ANESTHETIST, CERTIFIED REGISTERED

## 2019-03-28 PROCEDURE — 36000706: Performed by: SURGERY

## 2019-03-28 PROCEDURE — 71000015 HC POSTOP RECOV 1ST HR: Performed by: SURGERY

## 2019-03-28 PROCEDURE — 36561 PR INSERT TUNNELED CV CATH WITH PORT: ICD-10-PCS | Mod: LT,,, | Performed by: SURGERY

## 2019-03-28 PROCEDURE — D9220A PRA ANESTHESIA: Mod: CRNA,,, | Performed by: NURSE ANESTHETIST, CERTIFIED REGISTERED

## 2019-03-28 PROCEDURE — D9220A PRA ANESTHESIA: ICD-10-PCS | Mod: ANES,,, | Performed by: ANESTHESIOLOGY

## 2019-03-28 PROCEDURE — 71000016 HC POSTOP RECOV ADDL HR: Performed by: SURGERY

## 2019-03-28 PROCEDURE — D9220A PRA ANESTHESIA: Mod: ANES,,, | Performed by: ANESTHESIOLOGY

## 2019-03-28 PROCEDURE — 77001 FLUOROGUIDE FOR VEIN DEVICE: CPT | Mod: 26,,, | Performed by: SURGERY

## 2019-03-28 DEVICE — KIT POWERPORT SINGLE 8FR: Type: IMPLANTABLE DEVICE | Site: CHEST | Status: FUNCTIONAL

## 2019-03-28 RX ORDER — LORAZEPAM 2 MG/ML
0.25 INJECTION INTRAMUSCULAR ONCE AS NEEDED
Status: DISCONTINUED | OUTPATIENT
Start: 2019-03-28 | End: 2019-03-28 | Stop reason: HOSPADM

## 2019-03-28 RX ORDER — MIDAZOLAM HYDROCHLORIDE 1 MG/ML
INJECTION INTRAMUSCULAR; INTRAVENOUS
Status: DISCONTINUED | OUTPATIENT
Start: 2019-03-28 | End: 2019-03-28

## 2019-03-28 RX ORDER — ACETAMINOPHEN 325 MG/1
650 TABLET ORAL EVERY 8 HOURS PRN
Status: DISCONTINUED | OUTPATIENT
Start: 2019-03-28 | End: 2019-03-28 | Stop reason: HOSPADM

## 2019-03-28 RX ORDER — HYDROCODONE BITARTRATE AND ACETAMINOPHEN 5; 325 MG/1; MG/1
1 TABLET ORAL EVERY 4 HOURS PRN
Status: DISCONTINUED | OUTPATIENT
Start: 2019-03-28 | End: 2019-03-28 | Stop reason: HOSPADM

## 2019-03-28 RX ORDER — PROPOFOL 10 MG/ML
VIAL (ML) INTRAVENOUS CONTINUOUS PRN
Status: DISCONTINUED | OUTPATIENT
Start: 2019-03-28 | End: 2019-03-28

## 2019-03-28 RX ORDER — FENTANYL CITRATE 50 UG/ML
INJECTION, SOLUTION INTRAMUSCULAR; INTRAVENOUS
Status: DISCONTINUED | OUTPATIENT
Start: 2019-03-28 | End: 2019-03-28

## 2019-03-28 RX ORDER — PROPOFOL 10 MG/ML
VIAL (ML) INTRAVENOUS
Status: DISCONTINUED | OUTPATIENT
Start: 2019-03-28 | End: 2019-03-28

## 2019-03-28 RX ORDER — ONDANSETRON 8 MG/1
8 TABLET, ORALLY DISINTEGRATING ORAL EVERY 8 HOURS PRN
Status: DISCONTINUED | OUTPATIENT
Start: 2019-03-28 | End: 2019-03-28 | Stop reason: HOSPADM

## 2019-03-28 RX ORDER — HYDROCODONE BITARTRATE AND ACETAMINOPHEN 10; 325 MG/1; MG/1
1 TABLET ORAL EVERY 4 HOURS PRN
Status: DISCONTINUED | OUTPATIENT
Start: 2019-03-28 | End: 2019-03-28 | Stop reason: HOSPADM

## 2019-03-28 RX ORDER — HYDROMORPHONE HYDROCHLORIDE 1 MG/ML
0.2 INJECTION, SOLUTION INTRAMUSCULAR; INTRAVENOUS; SUBCUTANEOUS EVERY 5 MIN PRN
Status: DISCONTINUED | OUTPATIENT
Start: 2019-03-28 | End: 2019-03-28 | Stop reason: HOSPADM

## 2019-03-28 RX ORDER — SODIUM CHLORIDE 9 MG/ML
INJECTION, SOLUTION INTRAVENOUS CONTINUOUS
Status: DISCONTINUED | OUTPATIENT
Start: 2019-03-28 | End: 2019-03-28 | Stop reason: HOSPADM

## 2019-03-28 RX ORDER — LIDOCAINE HYDROCHLORIDE 10 MG/ML
1 INJECTION, SOLUTION EPIDURAL; INFILTRATION; INTRACAUDAL; PERINEURAL ONCE
Status: COMPLETED | OUTPATIENT
Start: 2019-03-28 | End: 2019-03-28

## 2019-03-28 RX ORDER — ONDANSETRON HYDROCHLORIDE 2 MG/ML
INJECTION, SOLUTION INTRAMUSCULAR; INTRAVENOUS
Status: DISCONTINUED | OUTPATIENT
Start: 2019-03-28 | End: 2019-03-28

## 2019-03-28 RX ORDER — DEXAMETHASONE SODIUM PHOSPHATE 4 MG/ML
INJECTION, SOLUTION INTRA-ARTICULAR; INTRALESIONAL; INTRAMUSCULAR; INTRAVENOUS; SOFT TISSUE
Status: DISCONTINUED | OUTPATIENT
Start: 2019-03-28 | End: 2019-03-28

## 2019-03-28 RX ORDER — ACETAMINOPHEN 325 MG/1
650 TABLET ORAL EVERY 4 HOURS PRN
Status: DISCONTINUED | OUTPATIENT
Start: 2019-03-28 | End: 2019-03-28 | Stop reason: HOSPADM

## 2019-03-28 RX ORDER — SODIUM CHLORIDE 0.9 % (FLUSH) 0.9 %
3 SYRINGE (ML) INJECTION
Status: DISCONTINUED | OUTPATIENT
Start: 2019-03-28 | End: 2019-03-28 | Stop reason: HOSPADM

## 2019-03-28 RX ORDER — HYDROCODONE BITARTRATE AND ACETAMINOPHEN 5; 325 MG/1; MG/1
1 TABLET ORAL EVERY 6 HOURS PRN
Qty: 21 TABLET | Refills: 0 | Status: SHIPPED | OUTPATIENT
Start: 2019-03-28 | End: 2019-10-28 | Stop reason: ALTCHOICE

## 2019-03-28 RX ORDER — HEPARIN SODIUM (PORCINE) LOCK FLUSH IV SOLN 100 UNIT/ML 100 UNIT/ML
SOLUTION INTRAVENOUS
Status: DISCONTINUED | OUTPATIENT
Start: 2019-03-28 | End: 2019-03-28 | Stop reason: HOSPADM

## 2019-03-28 RX ORDER — ONDANSETRON 2 MG/ML
4 INJECTION INTRAMUSCULAR; INTRAVENOUS EVERY 12 HOURS PRN
Status: DISCONTINUED | OUTPATIENT
Start: 2019-03-28 | End: 2019-03-28 | Stop reason: HOSPADM

## 2019-03-28 RX ORDER — BUPIVACAINE HYDROCHLORIDE 2.5 MG/ML
INJECTION, SOLUTION EPIDURAL; INFILTRATION; INTRACAUDAL
Status: DISCONTINUED | OUTPATIENT
Start: 2019-03-28 | End: 2019-03-28 | Stop reason: HOSPADM

## 2019-03-28 RX ORDER — LIDOCAINE HYDROCHLORIDE AND EPINEPHRINE 10; 10 MG/ML; UG/ML
INJECTION, SOLUTION INFILTRATION; PERINEURAL
Status: DISCONTINUED | OUTPATIENT
Start: 2019-03-28 | End: 2019-03-28 | Stop reason: HOSPADM

## 2019-03-28 RX ORDER — LIDOCAINE HCL/PF 100 MG/5ML
SYRINGE (ML) INTRAVENOUS
Status: DISCONTINUED | OUTPATIENT
Start: 2019-03-28 | End: 2019-03-28

## 2019-03-28 RX ADMIN — LIDOCAINE HYDROCHLORIDE 10 MG: 10 INJECTION, SOLUTION EPIDURAL; INFILTRATION; INTRACAUDAL; PERINEURAL at 06:03

## 2019-03-28 RX ADMIN — SODIUM CHLORIDE, SODIUM GLUCONATE, SODIUM ACETATE, POTASSIUM CHLORIDE, MAGNESIUM CHLORIDE, SODIUM PHOSPHATE, DIBASIC, AND POTASSIUM PHOSPHATE: .53; .5; .37; .037; .03; .012; .00082 INJECTION, SOLUTION INTRAVENOUS at 07:03

## 2019-03-28 RX ADMIN — MIDAZOLAM HYDROCHLORIDE 2 MG: 1 INJECTION, SOLUTION INTRAMUSCULAR; INTRAVENOUS at 06:03

## 2019-03-28 RX ADMIN — ONDANSETRON 4 MG: 2 INJECTION, SOLUTION INTRAMUSCULAR; INTRAVENOUS at 07:03

## 2019-03-28 RX ADMIN — LIDOCAINE HYDROCHLORIDE 40 MG: 20 INJECTION, SOLUTION INTRAVENOUS at 07:03

## 2019-03-28 RX ADMIN — DEXTROSE 2 G: 50 INJECTION, SOLUTION INTRAVENOUS at 07:03

## 2019-03-28 RX ADMIN — PROPOFOL 150 MCG/KG/MIN: 10 INJECTION, EMULSION INTRAVENOUS at 07:03

## 2019-03-28 RX ADMIN — PROPOFOL 30 MG: 10 INJECTION, EMULSION INTRAVENOUS at 07:03

## 2019-03-28 RX ADMIN — FENTANYL CITRATE 25 MCG: 50 INJECTION, SOLUTION INTRAMUSCULAR; INTRAVENOUS at 07:03

## 2019-03-28 RX ADMIN — SODIUM CHLORIDE: 0.9 INJECTION, SOLUTION INTRAVENOUS at 06:03

## 2019-03-28 RX ADMIN — DEXAMETHASONE SODIUM PHOSPHATE 4 MG: 4 INJECTION, SOLUTION INTRAMUSCULAR; INTRAVENOUS at 07:03

## 2019-03-28 NOTE — BRIEF OP NOTE
[facility]  Brief Operative Note     SUMMARY     Surgery Date: 3/28/2019     Surgeon(s) and Role:     * Vladimir Bernal Jr., MD - Primary     * Tee Liz MD - Resident - Assisting        Pre-op Diagnosis:  Malignant neoplasm of sigmoid colon [C18.7]    Post-op Diagnosis:  Post-Op Diagnosis Codes:     * Malignant neoplasm of sigmoid colon [C18.7]    Procedure(s) (LRB):  [id number] (N/A)    Anesthesia: Local MAC    Description of the findings of the procedure: L subclavian port placement via fluoroscopy    Findings/Key Components: L subclavian port placement with tip in the SVC superior to the right atrium confirmed via fluoroscopy.    Estimated Blood Loss: 2 cc         Specimens:   Specimen (12h ago, onward)    None          Discharge Note    SUMMARY     Admit Date: 3/28/2019    Discharge Date and Time:  03/28/2019     Hospital Course (synopsis of major diagnoses, care, treatment, and services provided during the course of the hospital stay): Patient was admitted for port a cath placement. She tolerated the procedure well and was admitted to PACU in stable condition. Post operatively CXR was taken. Patient met all milestones and was discharged.    Final Diagnosis: Post-Op Diagnosis Codes:     * Malignant neoplasm of sigmoid colon [C18.7]    Disposition: Home or Self Care    Follow Up/Patient Instructions:     Medications:  Reconciled Home Medications:      Medication List      START taking these medications    HYDROcodone-acetaminophen 5-325 mg per tablet  Commonly known as:  NORCO  Take 1 tablet by mouth every 6 (six) hours as needed for Pain.        CONTINUE taking these medications    cetirizine 10 MG tablet  Commonly known as:  ZYRTEC  Take 10 mg by mouth every evening.     famotidine 20 MG tablet  Commonly known as:  PEPCID  Take 20 mg by mouth daily as needed for Heartburn (acid reflux).     LO LOESTRIN FE 1 mg-10 mcg (24)/10 mcg (2) Tab  Generic drug:   norethindrone-e.estradiol-iron  Take 1 tablet by mouth once daily.     multivitamin with minerals tablet  Take 1 tablet by mouth once daily.     ondansetron 8 MG Tbdl  Commonly known as:  ZOFRAN-ODT  Take 1 tablet (8 mg total) by mouth every 6 (six) hours as needed.     promethazine 25 MG tablet  Commonly known as:  PHENERGAN  Take 1 tablet (25 mg total) by mouth every 6 (six) hours as needed for Nausea.          Discharge Procedure Orders   Diet general     Call MD for:  extreme fatigue     Call MD for:  persistent dizziness or light-headedness     Call MD for:  hives     Call MD for:  redness, tenderness, or signs of infection (pain, swelling, redness, odor or green/yellow discharge around incision site)     Call MD for:  difficulty breathing, headache or visual disturbances     Call MD for:  severe uncontrolled pain     Call MD for:  persistent nausea and vomiting     Call MD for:  temperature >100.4     Lifting restrictions   Order Comments: Do not lift more than 10 lbs for 2 weeks.  Do not vacuum or rake etc.     Remove dressing in 48 hours   Order Comments: OK to shower in 48 hours. Use dial soap. Pad area dry. Do not soak in a tub or swim in a pool until cleared to do so in clinic. Steri strips will fall off on their own. Do not remove steri strips yourself.     Follow-up Information     Vladimir Bernal Jr, MD In 2 weeks.    Specialties:  General Surgery, Bariatrics  Why:  post op  Contact information:  [address]  [facility] [address]  [phone]

## 2019-03-28 NOTE — DISCHARGE INSTRUCTIONS
Vascular Access Port Implantation   Port implantation is surgery to place (implant) a port under the skin. For vascular access, it is placed into a vein. The port allows medicines or nutrition to be sent right into your bloodstream. Blood can also be taken or given through the port. During the procedure, a long, thin tube called a catheter is threaded into one of your large veins. The tube is then attached to the port. This usually sits under the skin of your chest and causes a small bump. To use the port, a special needle is passed through your skin and into the port. The needle can stay in your skin for up to 7 days, if needed. A port can stay in place for weeks or months or longer.    Why is a vascular access port needed?  A vascular access port may allow healthcare providers to give you:  · Chemotherapy or other cancer-fighting drugs  · IV treatments, such as antibiotics or nutrition  · Hemodialysis (for kidney failure)  The port may also be used to draw blood.  Before the procedure  Follow any instructions you are given on how to prepare.  Tell your provider about any medicines you are taking. This includes:  · All prescription medicines  · Over-the-counter medicines such as aspirin or ibuprofen  · Herbs, vitamins, and other supplements  Also be sure your provider knows:  · If you are pregnant or think you may be pregnant  · If you are allergic to any medicines or substances, especially local anesthetics or iodine  · Your full medical history, including why you will need the port  · If you plan on doing any contact sports  During the procedure  · Before the procedure, an IV may be put into a vein in your arm or hand. This gives you fluids and medicines. You may be given medicine through the IV to help you relax during the procedure. This is called sedation. But some surgeons place ports using general anesthesia.  · The chest is used most often for the port. In some cases, your belly (abdomen) or arm will be  used instead.  · The skin over the insertion area is numbed with local anesthetic.  · Ultrasound or X-rays are used to help the healthcare provider guide the catheter into the proper location during the procedure.  · A cut (incision) is made in the skin where the port will be placed. A small pocket for the port is formed under the skin.  · A second small incision is made in the skin near the first incision. A tunnel under the skin is created. The catheter is put through the tunnel and into the blood vessel.  · The skin is closed over the port. It is held shut with stitches (sutures) or surgical glue or tape. The second small incision is also closed.  · A chest X-ray may be done to make sure the port is placed properly.  After the procedure  You may be taken to a recovery room where youll recover from the sedation. Nurses will check on you as you rest. If you have pain, nurses can give you medicine. If you are not staying in the hospital overnight, you will be sent home a few hours after the procedure is done. A healthcare provider will tell you when you can go home. An adult family member or friend will need to drive you home.  Recovering at home  · Take pain medicine as directed by your healthcare provider.  · Take it easy for 24 hours after the procedure. Avoid physical activity and heavy lifting until your healthcare provider says its OK.  · Keep the port clean and dry. Ask when you can shower again. You will need to keep the port dry by covering it when you shower.  · Care for the insertion site as you are directed.  · Dont swim, bathe, or do other activities that cause water to cover the insertion site.  · To keep the port from getting blocked with blood clots, flush it as often as directed. You should be shown the proper way to flush the port before you go home. It is important to follow these directions.     Risks and possible complications of implantation  · Bleeding  · Infection of the insertion  site  · Damage to a blood vessel  · Nerve injury or irritation  · Collapsed lung (for chest port placements)  · Skin breakdown over the port  Risks and possible complications of having a port  · Blocked  port or catheter  · Leakage or breakage of the port or catheter  · The port moves out of position  · Blood clot  · Skin or bloodstream infection  · Skin breakdown over the port      When to seek medical care  Call your healthcare provider right away if you have any of the following:  · A fever of 100.4°F (38.0°C) or higher  · You can't access or use the port properly  · You can't flush the port or get a blood return  · The skin near the port is red, warm, swollen, or broken  · You have shoulder pain on the side where the port is located  · You feel a heart flutter or racing heart   · Swollen arm, if the port is placed in your arm   Date Last Reviewed: 7/1/2016  © 9309-6678 [employer]. [address] [place] [address], [place] [address]. All rights reserved. This information is not intended as a substitute for professional medical care. Always follow your healthcare professional's instructions.

## 2019-03-28 NOTE — ANESTHESIA PREPROCEDURE EVALUATION
03/28/2019  Irene Galo is a 53 y.o., female.    Anesthesia Evaluation    I have reviewed the Patient Summary Reports.        Review of Systems  Anesthesia Hx:  No problems with previous Anesthesia    Social:  Non-Smoker    Hematology/Oncology:  Hematology Normal      Current/Recent Cancer. (Colon cancer)   EENT/Dental:EENT/Dental Normal   Cardiovascular:  Cardiovascular Normal     Pulmonary:  Pulmonary Normal    Renal/:  Renal/ Normal     Hepatic/GI:   GERD    Musculoskeletal:  Musculoskeletal Normal    Neurological:  Neurology Normal    Endocrine:  Endocrine Normal    Dermatological:  Skin Normal    Psych:  Psychiatric Normal           Physical Exam  General:  Well nourished    Airway/Jaw/Neck:  Airway Findings: Mouth Opening: Normal Tongue: Normal  General Airway Assessment: Adult  Mallampati: II  Improves to II with phonation.  TM Distance: Normal, at least 6 cm  Jaw/Neck Findings:  Neck ROM: Normal ROM      Dental:  Dental Findings: In tact   Chest/Lungs:  Chest/Lungs Findings: Clear to auscultation, Normal Respiratory Rate     Heart/Vascular:  Heart Findings: Rate: Normal  Rhythm: Regular Rhythm  Sounds: Normal             Anesthesia Plan  Type of Anesthesia, risks & benefits discussed:  Anesthesia Type:  general  Patient's Preference: General  Intra-op Monitoring Plan:   Intra-op Monitoring Plan Comments:   Post Op Pain Control Plan:   Post Op Pain Control Plan Comments:   Induction:   IV  Beta Blocker:  Patient is not currently on a Beta-Blocker (No further documentation required).       Informed Consent: Patient understands risks and agrees with Anesthesia plan.  Questions answered. Anesthesia consent signed with patient.  ASA Score: 3     Day of Surgery Review of History & Physical: I have interviewed and examined the patient. I have reviewed the patient's H&P dated:  There are no  significant changes.          Ready For Surgery From Anesthesia Perspective.

## 2019-03-28 NOTE — TRANSFER OF CARE
"Anesthesia Transfer of Care Note    Patient: Irene Galo    Procedure(s) Performed: Procedure(s) (LRB):  [id number]-CATH (N/A)    Patient location: [facility]    Anesthesia Type: general    Transport from OR: Transported from OR on 6-10 L/min O2 by face mask with adequate spontaneous ventilation    Post pain: adequate analgesia    Post assessment: no apparent anesthetic complications and tolerated procedure well    Post vital signs: stable    Level of consciousness: awake    Nausea/Vomiting: no nausea/vomiting    Complications: none    Transfer of care protocol was followed      Last vitals:   Visit Vitals  BP (!) 153/87 (BP Location: Left arm, Patient Position: Lying)   Pulse 70   Temp 37 °C (98.6 °F) (Temporal)   Resp 18   Ht 5' 2" (1.575 m)   Wt 80.7 kg (178 lb)   LMP 02/28/2019   SpO2 100%   Breastfeeding? No   BMI 32.56 kg/m²     "

## 2019-03-29 NOTE — OP NOTE
Operative Note       Surgery Date: 3/28/2019     Staff surgeon: Surgeon(s) and Role:     * Vladimir Bernal Jr., MD - Primary     * Tee Liz MD - Resident - Assisting    Surgeon: Tee Liz    Pre-op Diagnosis:  Malignant neoplasm of sigmoid colon [C18.7]    Post-op Diagnosis:  Malignant neoplasm of sigmoid colon [C18.7]    Procedure:  Procedure(s) (LRB):  [id number] (N/A)    Anesthesia: Local MAC    Findings: Left Subclavian port placement confirmed in the SVC via fluoroscopy    Specimen:   Specimens (From admission, onward)    None           Estimated Blood Loss:2 cc    Indication for procedure: Chemotherapy    Procedure in detail:     After informed consent was assured in the pre-op holding area, the patient was brought to the OR and MAC anesthesia was induced.  The patient was prepped and draped in the usual sterile fashion inferiorly from just above the horizontal line between the nipples superiorly to the angle of the left mandible and from left axilla. The mediport was selected for this patient. The insertion site was identified using appropriate landmarks. The insertion site was anesthetized using local down to the periosteum. The introducer needle was inserted midway between the jugular notch and the lateral tip of the clavicle and two centimeters below the clavicle.  Aspiration and advancement yielded dark red blood consistent with subclavian cannulation. The wire was inserted through the needle to appropriate depth and C-arm was brought in draped to confirm wire below the diaphragm, consistent with venous cannulation through the subclavian to the SVC, through the right atrium, to the IVC. The c-arm was taken back from the OR table.  2 cm below the puncture site, a 4 cm incision was made horizontally with the #15 blade and an appropriately-sized pocket was developed using blunt dissection and Bovie electrocautery.  The mediport was assembled and sutured into the pocket using  2 3-0 vicryl sutures. The catheter was then measured and cut to the appropriate length using the C-arm and superimposition of the catheter on the external chest with live fluoroscopy showing the superimposed catheter tip projecting over the SVC. A skin nick was made using #11 blade scalpel at the insertion site of the guidewire.The rigid tunneler was then used to pass the catheter through a short, deep subcutaneous tract to the puncture exit site, The introducer sheath was fully inserted under fluoroscopic guidance and the wire withdrawn. Then the catheter was passed down the break-away introducer sheath.  then good flow was confirmed by accessing the port with mata needle and syringe and aspirating to see blood return, then forcefully flushing sterile saline through the port and catheter with appropriately low resistance.  An appropriate volume of heparinized saline, 3 cc, was then injected into the port via mata needle.  C arm was then brought back in to confirm the port was in appropriate position with no kinks in the catheter.  The skin over the port was then closed using interrupted 3-0 vicryl sutures and then running 4-0 subcuticular monocryl.  A single interrupted 4-0 monocryl suture was used to close the puncture site. Mastisol and steri strips were applied over the incision and then covered with telfa and tegaderm.    The procedure was tolerated well and the patient was sent to recovery in good condition.  Post-central venous cannulation chest x-ray showed no pneumothorax             Disposition: PACU - hemodynamically stable.           Condition: Good

## 2019-03-30 NOTE — ANESTHESIA POSTPROCEDURE EVALUATION
Anesthesia Post Evaluation    Patient: Irene Galo    Procedure(s) Performed: Procedure(s) (LRB):  [id number] (N/A)    Final Anesthesia Type: general  Patient location during evaluation: PACU  Patient participation: Yes- Able to Participate  Level of consciousness: awake and alert  Post-procedure vital signs: reviewed and stable  Pain management: adequate  Airway patency: patent  PONV status at discharge: No PONV  Anesthetic complications: no      Cardiovascular status: blood pressure returned to baseline and stable  Respiratory status: unassisted  Hydration status: euvolemic  Follow-up not needed.          Vitals Value Taken Time   /84 3/28/2019  9:17 AM   Temp 36.5 °C (97.7 °F) 3/28/2019  9:15 AM   Pulse 62 3/28/2019  9:27 AM   Resp 18 3/28/2019  9:15 AM   SpO2 98 % 3/28/2019  9:27 AM   Vitals shown include unvalidated device data.      No case tracking events are documented in the log.      Pain/Bon Score: No data recorded

## 2019-04-02 ENCOUNTER — HOSPITAL ENCOUNTER (OUTPATIENT)
Dept: RADIOLOGY | Facility: HOSPITAL | Age: 54
Discharge: HOME | End: 2019-04-02
Attending: INTERNAL MEDICINE
Payer: COMMERCIAL

## 2019-04-02 ENCOUNTER — LAB VISIT (OUTPATIENT)
Dept: LAB | Facility: HOSPITAL | Age: 54
End: 2019-04-02
Attending: INTERNAL MEDICINE
Payer: COMMERCIAL

## 2019-04-02 ENCOUNTER — INFUSION (OUTPATIENT)
Dept: INFUSION THERAPY | Facility: HOSPITAL | Age: 54
End: 2019-04-02
Attending: INTERNAL MEDICINE
Payer: COMMERCIAL

## 2019-04-02 ENCOUNTER — OFFICE VISIT (OUTPATIENT)
Dept: HEMATOLOGY/ONCOLOGY | Facility: CLINIC | Age: 54
End: 2019-04-02
Payer: COMMERCIAL

## 2019-04-02 ENCOUNTER — HOSPITAL ENCOUNTER (OUTPATIENT)
Dept: CARDIOLOGY | Facility: HOSPITAL | Age: 54
Discharge: HOME | End: 2019-04-02
Attending: INTERNAL MEDICINE
Payer: COMMERCIAL

## 2019-04-02 VITALS
OXYGEN SATURATION: 97 % | DIASTOLIC BLOOD PRESSURE: 81 MMHG | SYSTOLIC BLOOD PRESSURE: 118 MMHG | HEART RATE: 74 BPM | TEMPERATURE: 98 F | RESPIRATION RATE: 18 BRPM

## 2019-04-02 VITALS
TEMPERATURE: 99 F | BODY MASS INDEX: 32.19 KG/M2 | HEART RATE: 74 BPM | HEIGHT: 63 IN | OXYGEN SATURATION: 96 % | DIASTOLIC BLOOD PRESSURE: 81 MMHG | SYSTOLIC BLOOD PRESSURE: 138 MMHG | WEIGHT: 181.69 LBS

## 2019-04-02 DIAGNOSIS — C77.9 SECONDARY ADENOCARCINOMA OF LYMPH NODE: ICD-10-CM

## 2019-04-02 DIAGNOSIS — C18.7 MALIGNANT NEOPLASM OF SIGMOID COLON: Primary | ICD-10-CM

## 2019-04-02 DIAGNOSIS — R07.89 CHEST DISCOMFORT: ICD-10-CM

## 2019-04-02 DIAGNOSIS — C18.7 MALIGNANT NEOPLASM OF SIGMOID COLON: ICD-10-CM

## 2019-04-02 DIAGNOSIS — Z09 CHEMOTHERAPY FOLLOW-UP EXAMINATION: ICD-10-CM

## 2019-04-02 LAB
ALBUMIN SERPL BCP-MCNC: 3.6 G/DL (ref 3.5–5.2)
ALP SERPL-CCNC: 59 U/L (ref 55–135)
ALT SERPL W/O P-5'-P-CCNC: 13 U/L (ref 10–44)
ANION GAP SERPL CALC-SCNC: 7 MMOL/L (ref 8–16)
AST SERPL-CCNC: 14 U/L (ref 10–40)
BILIRUB SERPL-MCNC: 0.4 MG/DL (ref 0.1–1)
BUN SERPL-MCNC: 10 MG/DL (ref 6–20)
CALCIUM SERPL-MCNC: 9.9 MG/DL (ref 8.7–10.5)
CHLORIDE SERPL-SCNC: 106 MMOL/L (ref 95–110)
CO2 SERPL-SCNC: 27 MMOL/L (ref 23–29)
CREAT SERPL-MCNC: 0.8 MG/DL (ref 0.5–1.4)
ERYTHROCYTE [DISTWIDTH] IN BLOOD BY AUTOMATED COUNT: 14 % (ref 11.5–14.5)
EST. GFR  (AFRICAN AMERICAN): >60 ML/MIN/1.73 M^2
EST. GFR  (NON AFRICAN AMERICAN): >60 ML/MIN/1.73 M^2
GLUCOSE SERPL-MCNC: 78 MG/DL (ref 70–110)
HCT VFR BLD AUTO: 41.2 % (ref 37–48.5)
HGB BLD-MCNC: 13.4 G/DL (ref 12–16)
MCH RBC QN AUTO: 29.9 PG (ref 27–31)
MCHC RBC AUTO-ENTMCNC: 32.5 G/DL (ref 32–36)
MCV RBC AUTO: 92 FL (ref 82–98)
NEUTROPHILS # BLD AUTO: 10 K/UL (ref 1.8–7.7)
PLATELET # BLD AUTO: 270 K/UL (ref 150–350)
PMV BLD AUTO: 11.4 FL (ref 9.2–12.9)
POTASSIUM SERPL-SCNC: 4.5 MMOL/L (ref 3.5–5.1)
PROT SERPL-MCNC: 7.3 G/DL (ref 6–8.4)
RBC # BLD AUTO: 4.48 M/UL (ref 4–5.4)
SODIUM SERPL-SCNC: 140 MMOL/L (ref 136–145)
WBC # BLD AUTO: 13.27 K/UL (ref 3.9–12.7)

## 2019-04-02 PROCEDURE — 71275 CT ANGIOGRAPHY CHEST: CPT | Mod: TC

## 2019-04-02 PROCEDURE — 71275 CTA CHEST NON CORONARY: ICD-10-PCS | Mod: 26,,, | Performed by: RADIOLOGY

## 2019-04-02 PROCEDURE — 96415 CHEMO IV INFUSION ADDL HR: CPT

## 2019-04-02 PROCEDURE — 96411 CHEMO IV PUSH ADDL DRUG: CPT

## 2019-04-02 PROCEDURE — 36415 COLL VENOUS BLD VENIPUNCTURE: CPT

## 2019-04-02 PROCEDURE — 93010 EKG 12-LEAD: ICD-10-PCS | Mod: ,,, | Performed by: INTERNAL MEDICINE

## 2019-04-02 PROCEDURE — 96413 CHEMO IV INFUSION 1 HR: CPT

## 2019-04-02 PROCEDURE — 96367 TX/PROPH/DG ADDL SEQ IV INF: CPT

## 2019-04-02 PROCEDURE — 80053 COMPREHEN METABOLIC PANEL: CPT

## 2019-04-02 PROCEDURE — 99999 PR PBB SHADOW E&M-EST. PATIENT-LVL III: ICD-10-PCS | Mod: PBBFAC,,, | Performed by: INTERNAL MEDICINE

## 2019-04-02 PROCEDURE — 93010 ELECTROCARDIOGRAM REPORT: CPT | Mod: ,,, | Performed by: INTERNAL MEDICINE

## 2019-04-02 PROCEDURE — 93005 ELECTROCARDIOGRAM TRACING: CPT

## 2019-04-02 PROCEDURE — 3008F PR BODY MASS INDEX (BMI) DOCUMENTED: ICD-10-PCS | Mod: CPTII,S$GLB,, | Performed by: INTERNAL MEDICINE

## 2019-04-02 PROCEDURE — 3008F BODY MASS INDEX DOCD: CPT | Mod: CPTII,S$GLB,, | Performed by: INTERNAL MEDICINE

## 2019-04-02 PROCEDURE — 85027 COMPLETE CBC AUTOMATED: CPT

## 2019-04-02 PROCEDURE — 25500020 PHARM REV CODE 255: Performed by: INTERNAL MEDICINE

## 2019-04-02 PROCEDURE — 63600175 PHARM REV CODE 636 W HCPCS: Performed by: INTERNAL MEDICINE

## 2019-04-02 PROCEDURE — 96368 THER/DIAG CONCURRENT INF: CPT

## 2019-04-02 PROCEDURE — 71275 CT ANGIOGRAPHY CHEST: CPT | Mod: 26,,, | Performed by: RADIOLOGY

## 2019-04-02 PROCEDURE — 99215 OFFICE O/P EST HI 40 MIN: CPT | Mod: S$GLB,,, | Performed by: INTERNAL MEDICINE

## 2019-04-02 PROCEDURE — 99999 PR PBB SHADOW E&M-EST. PATIENT-LVL III: CPT | Mod: PBBFAC,,, | Performed by: INTERNAL MEDICINE

## 2019-04-02 PROCEDURE — 99215 PR OFFICE/OUTPT VISIT, EST, LEVL V, 40-54 MIN: ICD-10-PCS | Mod: S$GLB,,, | Performed by: INTERNAL MEDICINE

## 2019-04-02 PROCEDURE — 25000003 PHARM REV CODE 250: Performed by: INTERNAL MEDICINE

## 2019-04-02 PROCEDURE — 96416 CHEMO PROLONG INFUSE W/PUMP: CPT

## 2019-04-02 RX ORDER — EPINEPHRINE 0.3 MG/.3ML
0.3 INJECTION SUBCUTANEOUS ONCE AS NEEDED
Status: CANCELLED | OUTPATIENT
Start: 2019-04-02

## 2019-04-02 RX ORDER — SODIUM CHLORIDE 0.9 % (FLUSH) 0.9 %
10 SYRINGE (ML) INJECTION
Status: CANCELLED | OUTPATIENT
Start: 2019-04-02

## 2019-04-02 RX ORDER — SODIUM CHLORIDE 0.9 % (FLUSH) 0.9 %
10 SYRINGE (ML) INJECTION
Status: CANCELLED | OUTPATIENT
Start: 2019-04-04

## 2019-04-02 RX ORDER — HEPARIN 100 UNIT/ML
500 SYRINGE INTRAVENOUS
Status: CANCELLED | OUTPATIENT
Start: 2019-04-02

## 2019-04-02 RX ORDER — DIPHENHYDRAMINE HYDROCHLORIDE 50 MG/ML
50 INJECTION INTRAMUSCULAR; INTRAVENOUS ONCE AS NEEDED
Status: CANCELLED | OUTPATIENT
Start: 2019-04-02

## 2019-04-02 RX ORDER — HEPARIN 100 UNIT/ML
500 SYRINGE INTRAVENOUS
Status: CANCELLED | OUTPATIENT
Start: 2019-04-04

## 2019-04-02 RX ORDER — FLUOROURACIL 50 MG/ML
400 INJECTION, SOLUTION INTRAVENOUS
Status: CANCELLED | OUTPATIENT
Start: 2019-04-02

## 2019-04-02 RX ORDER — FLUOROURACIL 50 MG/ML
400 INJECTION, SOLUTION INTRAVENOUS
Status: COMPLETED | OUTPATIENT
Start: 2019-04-02 | End: 2019-04-02

## 2019-04-02 RX ADMIN — LEUCOVORIN CALCIUM 755 MG: 100 INJECTION, POWDER, LYOPHILIZED, FOR SOLUTION INTRAMUSCULAR; INTRAVENOUS at 02:04

## 2019-04-02 RX ADMIN — IOHEXOL 75 ML: 350 INJECTION, SOLUTION INTRAVENOUS at 12:04

## 2019-04-02 RX ADMIN — FLUOROURACIL 755 MG: 50 INJECTION, SOLUTION INTRAVENOUS at 04:04

## 2019-04-02 RX ADMIN — DEXAMETHASONE SODIUM PHOSPHATE: 4 INJECTION, SOLUTION INTRAMUSCULAR; INTRAVENOUS at 01:04

## 2019-04-02 RX ADMIN — FLUOROURACIL 4535 MG: 50 INJECTION, SOLUTION INTRAVENOUS at 04:04

## 2019-04-02 RX ADMIN — OXALIPLATIN 161 MG: 5 INJECTION, SOLUTION INTRAVENOUS at 02:04

## 2019-04-02 NOTE — Clinical Note
Schedule CBC,CMP and see me in 2 weeks and for FOLFOX. DC pump on day 3Schedule above on main campus

## 2019-04-02 NOTE — PROGRESS NOTES
Subjective:       Patient ID: Irene Galo is a 53 y.o. female.    Chief Complaint: Follow-up  ONCOLOGIC HISTORY: Ms. Irene Galo is a 53-year-old female who underwent screening colonoscopy and was noted to have a circumferential partially obstructing mass in the rectosigmoid, which could not be traversed with the scope.  Inferior edge was 18 to 19 cm from the anal verge.  This was done on 01/10/2019.  Pathology from that revealed moderate dysplasia.  Malignancy could not be excluded.  She underwent a CT scan on 01/15/2019 that revealed a focal area of thickening in the rectosigmoid colon measuring 1.9 cm in length, which could possibly correspond to the abnormality seen on recent colonoscopy.  There is some  slight prominent lymph node superior to this region of thickening along the course of the inferior mesenteric vessels measuring approximately 6.5 mm.  CEA was 3.3.  She underwent sigmoid colectomy on 02/15/2019.  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     HPIShe comes in to start adjuvant chemo with FOLFOX  She has her PORT placed on Thursday and notes chest discomfort since 2 days after PORT was placed. Symptoms last for 1 hour, can happen when she takes a deep breath but also occur if she does not do that as well.   Happens about 2-3 times/day    Review of Systems   Constitutional: Negative for appetite change, fatigue and unexpected weight change.   HENT: Negative for mouth sores.    Eyes: Negative for visual disturbance.   Respiratory: Negative for cough and shortness of breath.    Cardiovascular: Positive for chest pain.   Gastrointestinal: Negative for abdominal pain and diarrhea.   Genitourinary: Negative for frequency.   Musculoskeletal: Negative for back pain.   Skin: Negative for rash.   Neurological: Negative for headaches.   Hematological: Negative for adenopathy.   Psychiatric/Behavioral: The patient is not nervous/anxious.    All other systems reviewed and are negative.       Objective:      Physical Exam      LABS:  WBC   Date Value Ref Range Status   04/02/2019 13.27 (H) 3.90 - 12.70 K/uL Final     Hemoglobin   Date Value Ref Range Status   04/02/2019 13.4 12.0 - 16.0 g/dL Final     Hematocrit   Date Value Ref Range Status   04/02/2019 41.2 37.0 - 48.5 % Final     Platelets   Date Value Ref Range Status   04/02/2019 270 150 - 350 K/uL Final     Gran # (ANC)   Date Value Ref Range Status   04/02/2019 10.0 (H) 1.8 - 7.7 K/uL Final     Comment:     The ANC is based on a white cell differential from an   automated cell counter. It has not been microscopically   reviewed for the presence of abnormal cells. Clinical   correlation is required.         Chemistry        Component Value Date/Time     04/02/2019 1026    K 4.5 04/02/2019 1026     04/02/2019 1026    CO2 27 04/02/2019 1026    BUN 10 04/02/2019 1026    CREATININE 0.8 04/02/2019 1026    GLU 78 04/02/2019 1026        Component Value Date/Time    CALCIUM 9.9 04/02/2019 1026    ALKPHOS 59 04/02/2019 1026    AST 14 04/02/2019 1026    ALT 13 04/02/2019 1026    BILITOT 0.4 04/02/2019 1026    ESTGFRAFRICA >60 04/02/2019 1026    EGFRNONAA >60 04/02/2019 1026          Assessment:       1. Malignant neoplasm of sigmoid colon    2. Secondary adenocarcinoma of lymph node    3. Chemotherapy follow-up examination    4. Chest discomfort        Plan:        1,2,3. She is doing well and will proceed with cycle 1 of FOLFOX today and will return in 2 weeks for next cycle.  Patient was consented for chemotherapy today 4/2/2019 .   An extensive discussion was had which included a thorough discussion of the risk and benefits of treatment and alternatives.  Risks, including but not limited to, possible hair loss, bone marrow damage (anemia, thrombocytopenia, immune suppression, neutropenia), damage to body organs (brain, heart, liver, kidney, lungs, nervous system, skin, and others), allergic reactions, sterility, nausea/vomiting,  constipation/diarrhea, sores in the mouth, secondary cancers, local damage at possible injection sites, and rarely death were all discussed.  The patient agrees with the plan, and all questions have been answered to their satisfaction.  Consent was signed the patient, provider, and a third party witness.    4. CTA chest today is negative for PE, will also EKG today      Above care plan was discussed with patient and accompanying  and all questions were addressed to their satisfaction

## 2019-04-02 NOTE — PLAN OF CARE
"Problem: Adult Inpatient Plan of Care  Goal: Plan of Care Review  Outcome: Ongoing (interventions implemented as appropriate)  Arrived to unit. Pt had CTA, awaiting results. Dr Welch reviewed, no blood clot. Will proceed with chemo. Pt informed and verbalized understanding. Consent completed. Reinforced possible side effects of Folfox. Reinforced cold precautions.  Verbalized understanding. Tolerated Leucovorin and Oxaliplatin. At end of chemo, pt reports "numbness and tingling to tongue. No resp distress noted. Pt sitting in cold area of unit. At completion of 5FU push. Pt states numbness and tingling resolved. Pt watched video from INFU on care for pump. INFU consent obtained. 5 FU per pump initiated. Pt has home spill kit. Pt instructed to got to Pt reg to sign in for EKG. Pt discharged with , ambulatory. No complaints voiced.      "

## 2019-04-03 ENCOUNTER — PATIENT MESSAGE (OUTPATIENT)
Dept: HEMATOLOGY/ONCOLOGY | Facility: CLINIC | Age: 54
End: 2019-04-03

## 2019-04-04 ENCOUNTER — INFUSION (OUTPATIENT)
Dept: INFUSION THERAPY | Facility: HOSPITAL | Age: 54
End: 2019-04-04
Attending: INTERNAL MEDICINE
Payer: COMMERCIAL

## 2019-04-04 VITALS
DIASTOLIC BLOOD PRESSURE: 87 MMHG | HEART RATE: 71 BPM | SYSTOLIC BLOOD PRESSURE: 142 MMHG | RESPIRATION RATE: 18 BRPM | TEMPERATURE: 98 F

## 2019-04-04 DIAGNOSIS — C18.7 MALIGNANT NEOPLASM OF SIGMOID COLON: Primary | ICD-10-CM

## 2019-04-04 PROCEDURE — 25000003 PHARM REV CODE 250: Performed by: INTERNAL MEDICINE

## 2019-04-04 PROCEDURE — 63600175 PHARM REV CODE 636 W HCPCS: Performed by: INTERNAL MEDICINE

## 2019-04-04 PROCEDURE — A4216 STERILE WATER/SALINE, 10 ML: HCPCS | Performed by: INTERNAL MEDICINE

## 2019-04-04 PROCEDURE — 96523 IRRIG DRUG DELIVERY DEVICE: CPT

## 2019-04-04 RX ORDER — HEPARIN 100 UNIT/ML
500 SYRINGE INTRAVENOUS
Status: DISCONTINUED | OUTPATIENT
Start: 2019-04-04 | End: 2019-04-04 | Stop reason: HOSPADM

## 2019-04-04 RX ORDER — SODIUM CHLORIDE 0.9 % (FLUSH) 0.9 %
10 SYRINGE (ML) INJECTION
Status: DISCONTINUED | OUTPATIENT
Start: 2019-04-04 | End: 2019-04-04 | Stop reason: HOSPADM

## 2019-04-04 RX ADMIN — Medication 10 ML: at 03:04

## 2019-04-04 RX ADMIN — HEPARIN 500 UNITS: 100 SYRINGE at 03:04

## 2019-04-04 NOTE — NURSING
Patient in clinic for CADD pump d/c. Medication visualized to be completed. PAC flushed, heparin locked, and de-accessed. Patient denies any pain or discomfort. VS stable. AVS provided to patient.

## 2019-04-08 ENCOUNTER — OFFICE VISIT (OUTPATIENT)
Dept: SURGERY | Facility: CLINIC | Age: 54
End: 2019-04-08
Payer: COMMERCIAL

## 2019-04-08 VITALS
DIASTOLIC BLOOD PRESSURE: 86 MMHG | SYSTOLIC BLOOD PRESSURE: 145 MMHG | HEIGHT: 63 IN | WEIGHT: 178 LBS | BODY MASS INDEX: 31.54 KG/M2 | HEART RATE: 71 BPM

## 2019-04-08 DIAGNOSIS — Z09 POSTOP CHECK: Primary | ICD-10-CM

## 2019-04-08 PROCEDURE — 99999 PR PBB SHADOW E&M-EST. PATIENT-LVL III: CPT | Mod: PBBFAC,,, | Performed by: SURGERY

## 2019-04-08 PROCEDURE — 99024 PR POST-OP FOLLOW-UP VISIT: ICD-10-PCS | Mod: S$GLB,,, | Performed by: SURGERY

## 2019-04-08 PROCEDURE — 99999 PR PBB SHADOW E&M-EST. PATIENT-LVL III: ICD-10-PCS | Mod: PBBFAC,,, | Performed by: SURGERY

## 2019-04-08 PROCEDURE — 99024 POSTOP FOLLOW-UP VISIT: CPT | Mod: S$GLB,,, | Performed by: SURGERY

## 2019-04-08 NOTE — PROGRESS NOTES
HPI:  The patient is status post-port placement.  Doing well.  No issues with accessing port.  Discomfort improving.    PHYSICAL EXAM:  Physical Exam     In NAD  Incision c/d/i    ASSESSMENT:    The patient is doing well after surgery.     PLAN:    Follow up PRN.  Activity: regular duty        Vladimir Bernal MD

## 2019-04-15 ENCOUNTER — PATIENT MESSAGE (OUTPATIENT)
Dept: SURGERY | Facility: CLINIC | Age: 54
End: 2019-04-15

## 2019-04-15 ENCOUNTER — PATIENT MESSAGE (OUTPATIENT)
Dept: HEMATOLOGY/ONCOLOGY | Facility: CLINIC | Age: 54
End: 2019-04-15

## 2019-04-15 DIAGNOSIS — C18.9 MALIGNANT NEOPLASM OF COLON, UNSPECIFIED PART OF COLON: Primary | ICD-10-CM

## 2019-04-15 RX ORDER — LIDOCAINE AND PRILOCAINE 25; 25 MG/G; MG/G
CREAM TOPICAL
Qty: 30 G | Refills: 6 | Status: SHIPPED | OUTPATIENT
Start: 2019-04-15 | End: 2019-10-28 | Stop reason: ALTCHOICE

## 2019-04-16 ENCOUNTER — LAB VISIT (OUTPATIENT)
Dept: LAB | Facility: HOSPITAL | Age: 54
End: 2019-04-16
Attending: INTERNAL MEDICINE
Payer: COMMERCIAL

## 2019-04-16 ENCOUNTER — OFFICE VISIT (OUTPATIENT)
Dept: HEMATOLOGY/ONCOLOGY | Facility: CLINIC | Age: 54
End: 2019-04-16
Payer: COMMERCIAL

## 2019-04-16 ENCOUNTER — INFUSION (OUTPATIENT)
Dept: INFUSION THERAPY | Facility: HOSPITAL | Age: 54
End: 2019-04-16
Attending: INTERNAL MEDICINE
Payer: COMMERCIAL

## 2019-04-16 VITALS
RESPIRATION RATE: 17 BRPM | SYSTOLIC BLOOD PRESSURE: 151 MMHG | OXYGEN SATURATION: 98 % | BODY MASS INDEX: 32.27 KG/M2 | HEIGHT: 63 IN | DIASTOLIC BLOOD PRESSURE: 86 MMHG | TEMPERATURE: 98 F | WEIGHT: 182.13 LBS | HEART RATE: 68 BPM

## 2019-04-16 VITALS
TEMPERATURE: 98 F | HEART RATE: 65 BPM | DIASTOLIC BLOOD PRESSURE: 82 MMHG | RESPIRATION RATE: 18 BRPM | SYSTOLIC BLOOD PRESSURE: 145 MMHG

## 2019-04-16 DIAGNOSIS — C18.7 MALIGNANT NEOPLASM OF SIGMOID COLON: Primary | ICD-10-CM

## 2019-04-16 DIAGNOSIS — C77.9 SECONDARY ADENOCARCINOMA OF LYMPH NODE: ICD-10-CM

## 2019-04-16 DIAGNOSIS — C18.7 MALIGNANT NEOPLASM OF SIGMOID COLON: ICD-10-CM

## 2019-04-16 LAB
ALBUMIN SERPL BCP-MCNC: 3.2 G/DL (ref 3.5–5.2)
ALP SERPL-CCNC: 74 U/L (ref 55–135)
ALT SERPL W/O P-5'-P-CCNC: 15 U/L (ref 10–44)
ANION GAP SERPL CALC-SCNC: 6 MMOL/L (ref 8–16)
AST SERPL-CCNC: 17 U/L (ref 10–40)
BILIRUB SERPL-MCNC: 0.3 MG/DL (ref 0.1–1)
BUN SERPL-MCNC: 9 MG/DL (ref 6–20)
CALCIUM SERPL-MCNC: 9.1 MG/DL (ref 8.7–10.5)
CHLORIDE SERPL-SCNC: 107 MMOL/L (ref 95–110)
CO2 SERPL-SCNC: 26 MMOL/L (ref 23–29)
CREAT SERPL-MCNC: 0.7 MG/DL (ref 0.5–1.4)
ERYTHROCYTE [DISTWIDTH] IN BLOOD BY AUTOMATED COUNT: 13.6 % (ref 11.5–14.5)
EST. GFR  (AFRICAN AMERICAN): >60 ML/MIN/1.73 M^2
EST. GFR  (NON AFRICAN AMERICAN): >60 ML/MIN/1.73 M^2
GLUCOSE SERPL-MCNC: 85 MG/DL (ref 70–110)
HCT VFR BLD AUTO: 38.2 % (ref 37–48.5)
HGB BLD-MCNC: 12.5 G/DL (ref 12–16)
IMM GRANULOCYTES # BLD AUTO: 0.01 K/UL (ref 0–0.04)
MCH RBC QN AUTO: 29.5 PG (ref 27–31)
MCHC RBC AUTO-ENTMCNC: 32.7 G/DL (ref 32–36)
MCV RBC AUTO: 90 FL (ref 82–98)
NEUTROPHILS # BLD AUTO: 2.4 K/UL (ref 1.8–7.7)
PLATELET # BLD AUTO: 256 K/UL (ref 150–350)
PMV BLD AUTO: 10.8 FL (ref 9.2–12.9)
POTASSIUM SERPL-SCNC: 4.1 MMOL/L (ref 3.5–5.1)
PROT SERPL-MCNC: 6.7 G/DL (ref 6–8.4)
RBC # BLD AUTO: 4.24 M/UL (ref 4–5.4)
SODIUM SERPL-SCNC: 139 MMOL/L (ref 136–145)
WBC # BLD AUTO: 5.57 K/UL (ref 3.9–12.7)

## 2019-04-16 PROCEDURE — 96368 THER/DIAG CONCURRENT INF: CPT

## 2019-04-16 PROCEDURE — 3008F PR BODY MASS INDEX (BMI) DOCUMENTED: ICD-10-PCS | Mod: CPTII,S$GLB,, | Performed by: INTERNAL MEDICINE

## 2019-04-16 PROCEDURE — 85027 COMPLETE CBC AUTOMATED: CPT

## 2019-04-16 PROCEDURE — 80053 COMPREHEN METABOLIC PANEL: CPT

## 2019-04-16 PROCEDURE — 96413 CHEMO IV INFUSION 1 HR: CPT

## 2019-04-16 PROCEDURE — 96367 TX/PROPH/DG ADDL SEQ IV INF: CPT

## 2019-04-16 PROCEDURE — 3008F BODY MASS INDEX DOCD: CPT | Mod: CPTII,S$GLB,, | Performed by: INTERNAL MEDICINE

## 2019-04-16 PROCEDURE — 99215 OFFICE O/P EST HI 40 MIN: CPT | Mod: S$GLB,,, | Performed by: INTERNAL MEDICINE

## 2019-04-16 PROCEDURE — 99215 PR OFFICE/OUTPT VISIT, EST, LEVL V, 40-54 MIN: ICD-10-PCS | Mod: S$GLB,,, | Performed by: INTERNAL MEDICINE

## 2019-04-16 PROCEDURE — 63600175 PHARM REV CODE 636 W HCPCS: Performed by: INTERNAL MEDICINE

## 2019-04-16 PROCEDURE — 96415 CHEMO IV INFUSION ADDL HR: CPT

## 2019-04-16 PROCEDURE — 36415 COLL VENOUS BLD VENIPUNCTURE: CPT

## 2019-04-16 PROCEDURE — 99999 PR PBB SHADOW E&M-EST. PATIENT-LVL III: ICD-10-PCS | Mod: PBBFAC,,, | Performed by: INTERNAL MEDICINE

## 2019-04-16 PROCEDURE — 96416 CHEMO PROLONG INFUSE W/PUMP: CPT

## 2019-04-16 PROCEDURE — 96411 CHEMO IV PUSH ADDL DRUG: CPT

## 2019-04-16 PROCEDURE — 25000003 PHARM REV CODE 250: Performed by: INTERNAL MEDICINE

## 2019-04-16 PROCEDURE — 99999 PR PBB SHADOW E&M-EST. PATIENT-LVL III: CPT | Mod: PBBFAC,,, | Performed by: INTERNAL MEDICINE

## 2019-04-16 RX ORDER — HEPARIN 100 UNIT/ML
500 SYRINGE INTRAVENOUS
Status: CANCELLED | OUTPATIENT
Start: 2019-04-18

## 2019-04-16 RX ORDER — EPINEPHRINE 0.3 MG/.3ML
0.3 INJECTION SUBCUTANEOUS ONCE AS NEEDED
Status: DISCONTINUED | OUTPATIENT
Start: 2019-04-16 | End: 2019-04-16 | Stop reason: HOSPADM

## 2019-04-16 RX ORDER — DIPHENHYDRAMINE HYDROCHLORIDE 50 MG/ML
50 INJECTION INTRAMUSCULAR; INTRAVENOUS ONCE AS NEEDED
Status: CANCELLED | OUTPATIENT
Start: 2019-04-16

## 2019-04-16 RX ORDER — EPINEPHRINE 0.3 MG/.3ML
0.3 INJECTION SUBCUTANEOUS ONCE AS NEEDED
Status: CANCELLED | OUTPATIENT
Start: 2019-04-16

## 2019-04-16 RX ORDER — SODIUM CHLORIDE 0.9 % (FLUSH) 0.9 %
10 SYRINGE (ML) INJECTION
Status: CANCELLED | OUTPATIENT
Start: 2019-04-16

## 2019-04-16 RX ORDER — HEPARIN 100 UNIT/ML
500 SYRINGE INTRAVENOUS
Status: CANCELLED | OUTPATIENT
Start: 2019-04-16

## 2019-04-16 RX ORDER — FLUOROURACIL 50 MG/ML
400 INJECTION, SOLUTION INTRAVENOUS
Status: CANCELLED | OUTPATIENT
Start: 2019-04-16

## 2019-04-16 RX ORDER — SODIUM CHLORIDE 0.9 % (FLUSH) 0.9 %
10 SYRINGE (ML) INJECTION
Status: CANCELLED | OUTPATIENT
Start: 2019-04-18

## 2019-04-16 RX ORDER — HEPARIN 100 UNIT/ML
500 SYRINGE INTRAVENOUS
Status: DISCONTINUED | OUTPATIENT
Start: 2019-04-16 | End: 2019-04-16 | Stop reason: HOSPADM

## 2019-04-16 RX ORDER — DIPHENHYDRAMINE HYDROCHLORIDE 50 MG/ML
50 INJECTION INTRAMUSCULAR; INTRAVENOUS ONCE AS NEEDED
Status: DISCONTINUED | OUTPATIENT
Start: 2019-04-16 | End: 2019-04-16 | Stop reason: HOSPADM

## 2019-04-16 RX ORDER — FLUOROURACIL 50 MG/ML
400 INJECTION, SOLUTION INTRAVENOUS
Status: COMPLETED | OUTPATIENT
Start: 2019-04-16 | End: 2019-04-16

## 2019-04-16 RX ORDER — SODIUM CHLORIDE 0.9 % (FLUSH) 0.9 %
10 SYRINGE (ML) INJECTION
Status: DISCONTINUED | OUTPATIENT
Start: 2019-04-16 | End: 2019-04-16 | Stop reason: HOSPADM

## 2019-04-16 RX ADMIN — DEXTROSE: 50 INJECTION, SOLUTION INTRAVENOUS at 02:04

## 2019-04-16 RX ADMIN — OXALIPLATIN 161 MG: 5 INJECTION, SOLUTION, CONCENTRATE INTRAVENOUS at 03:04

## 2019-04-16 RX ADMIN — FLUOROURACIL 755 MG: 50 INJECTION, SOLUTION INTRAVENOUS at 05:04

## 2019-04-16 RX ADMIN — DEXAMETHASONE SODIUM PHOSPHATE: 4 INJECTION, SOLUTION INTRAMUSCULAR; INTRAVENOUS at 02:04

## 2019-04-16 RX ADMIN — FLUOROURACIL 4535 MG: 50 INJECTION, SOLUTION INTRAVENOUS at 05:04

## 2019-04-16 RX ADMIN — LEUCOVORIN CALCIUM 755 MG: 50 INJECTION, POWDER, LYOPHILIZED, FOR SOLUTION INTRAMUSCULAR; INTRAVENOUS at 03:04

## 2019-04-16 NOTE — PROGRESS NOTES
Subjective:       Patient ID: Irene Galo is a 53 y.o. female.    Chief Complaint: gnant neoplasm of sigmoid colon; Fatigue; and Gastroesophageal Reflux  ONCOLOGIC HISTORY: Ms. Irene Galo is a 53-year-old female who underwent screening colonoscopy and was noted to have a circumferential partially obstructing mass in the rectosigmoid, which could not be traversed with the scope.  Inferior edge was 18 to 19 cm from the anal verge.  This was done on 01/10/2019.  Pathology from that revealed moderate dysplasia.  Malignancy could not be excluded.  She underwent a CT scan on 01/15/2019 that revealed a focal area of thickening in the rectosigmoid colon measuring 1.9 cm in length, which could possibly correspond to the abnormality seen on recent colonoscopy.  There is some  slight prominent lymph node superior to this region of thickening along the course of the inferior mesenteric vessels measuring approximately 6.5 mm.  CEA was 3.3.  She underwent sigmoid colectomy on 02/15/2019.  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        HPI She comes in to receive cycle 2 of adjuvant chemo with FOLFOX  She noted decreased appetite and fatigue for 1 day after getting off pump and then did well.    She denies any nausea, vomiting, diarrhea, constipation, abdominal pain, weight loss or loss of appetite, chest pain, shortness of breath, leg swelling, fatigue, pain, headache, dizziness, or mood changes. Her ECOG PS is zero. She is accompanied by her           Review of Systems   Constitutional: Negative for appetite change and unexpected weight change.   Eyes: Negative for visual disturbance.   Respiratory: Negative for cough and shortness of breath.    Cardiovascular: Negative for chest pain.   Gastrointestinal: Negative for abdominal pain and diarrhea.   Genitourinary: Negative for frequency.   Musculoskeletal: Negative for back pain.   Skin: Negative for rash.   Neurological: Negative for headaches.    Hematological: Negative for adenopathy.   Psychiatric/Behavioral: The patient is not nervous/anxious.        Objective:      Physical Exam   Constitutional: She is oriented to person, place, and time. She appears well-developed and well-nourished.   HENT:   Mouth/Throat: No oropharyngeal exudate.   Cardiovascular: Normal rate and normal heart sounds.   Pulmonary/Chest: Effort normal and breath sounds normal. She has no wheezes.   Abdominal: Soft. Bowel sounds are normal. There is no tenderness.   Musculoskeletal: She exhibits no edema or tenderness.   Lymphadenopathy:     She has no cervical adenopathy.   Neurological: She is alert and oriented to person, place, and time. Coordination normal.   Skin: Skin is warm and dry. No rash noted.   Psychiatric: She has a normal mood and affect. Judgment and thought content normal.   Vitals reviewed.        LABS:  WBC   Date Value Ref Range Status   04/16/2019 5.57 3.90 - 12.70 K/uL Final     Hemoglobin   Date Value Ref Range Status   04/16/2019 12.5 12.0 - 16.0 g/dL Final     Hematocrit   Date Value Ref Range Status   04/16/2019 38.2 37.0 - 48.5 % Final     Platelets   Date Value Ref Range Status   04/16/2019 256 150 - 350 K/uL Final     Gran # (ANC)   Date Value Ref Range Status   04/16/2019 2.4 1.8 - 7.7 K/uL Final     Comment:     The ANC is based on a white cell differential from an   automated cell counter. It has not been microscopically   reviewed for the presence of abnormal cells. Clinical   correlation is required.         Chemistry        Component Value Date/Time     04/16/2019 1210    K 4.1 04/16/2019 1210     04/16/2019 1210    CO2 26 04/16/2019 1210    BUN 9 04/16/2019 1210    CREATININE 0.7 04/16/2019 1210    GLU 85 04/16/2019 1210        Component Value Date/Time    CALCIUM 9.1 04/16/2019 1210    ALKPHOS 74 04/16/2019 1210    AST 17 04/16/2019 1210    ALT 15 04/16/2019 1210    BILITOT 0.3 04/16/2019 1210    ESTGFRAFRICA >60.0 04/16/2019 1210     EGFRNONAA >60.0 04/16/2019 1210          Assessment:       1. Malignant neoplasm of sigmoid colon    2. Secondary adenocarcinoma of lymph node        Plan:        1,2. She is doing well and will proceed with FOLFOX and will return in 2 weeks for next cycle    Above care plan was discussed with patient and accompanying  and all questions were addressed to their satisfaction

## 2019-04-16 NOTE — PLAN OF CARE
Problem: Adult Inpatient Plan of Care  Goal: Plan of Care Review  Outcome: Ongoing (interventions implemented as appropriate)  1740:  Patient tolerated FOLFOX well, NAD noted, denies any new issues.  5-FU CIV over 46 hours initiated, dose and setting verified by 2 RN's, all connections secured.  Anticipate 5-FU completion at 1535 on Thursday, patient verbalized understanding.  Released in stable condition, ambulated off unit accompanied by her .

## 2019-04-18 ENCOUNTER — INFUSION (OUTPATIENT)
Dept: INFUSION THERAPY | Facility: HOSPITAL | Age: 54
End: 2019-04-18
Attending: INTERNAL MEDICINE
Payer: COMMERCIAL

## 2019-04-18 ENCOUNTER — OFFICE VISIT (OUTPATIENT)
Dept: SURGERY | Facility: CLINIC | Age: 54
End: 2019-04-18
Payer: COMMERCIAL

## 2019-04-18 VITALS
SYSTOLIC BLOOD PRESSURE: 132 MMHG | DIASTOLIC BLOOD PRESSURE: 73 MMHG | RESPIRATION RATE: 18 BRPM | HEART RATE: 64 BPM | TEMPERATURE: 98 F

## 2019-04-18 VITALS
BODY MASS INDEX: 34.32 KG/M2 | RESPIRATION RATE: 16 BRPM | WEIGHT: 186.5 LBS | SYSTOLIC BLOOD PRESSURE: 140 MMHG | DIASTOLIC BLOOD PRESSURE: 68 MMHG | HEART RATE: 66 BPM | HEIGHT: 62 IN

## 2019-04-18 DIAGNOSIS — C18.7 MALIGNANT NEOPLASM OF SIGMOID COLON: Primary | ICD-10-CM

## 2019-04-18 PROCEDURE — 99024 PR POST-OP FOLLOW-UP VISIT: ICD-10-PCS | Mod: S$GLB,,, | Performed by: COLON & RECTAL SURGERY

## 2019-04-18 PROCEDURE — 99024 POSTOP FOLLOW-UP VISIT: CPT | Mod: S$GLB,,, | Performed by: COLON & RECTAL SURGERY

## 2019-04-18 PROCEDURE — 96523 IRRIG DRUG DELIVERY DEVICE: CPT

## 2019-04-18 PROCEDURE — 99999 PR PBB SHADOW E&M-EST. PATIENT-LVL III: ICD-10-PCS | Mod: PBBFAC,,, | Performed by: COLON & RECTAL SURGERY

## 2019-04-18 PROCEDURE — 63600175 PHARM REV CODE 636 W HCPCS: Performed by: INTERNAL MEDICINE

## 2019-04-18 PROCEDURE — A4216 STERILE WATER/SALINE, 10 ML: HCPCS | Performed by: INTERNAL MEDICINE

## 2019-04-18 PROCEDURE — 99999 PR PBB SHADOW E&M-EST. PATIENT-LVL III: CPT | Mod: PBBFAC,,, | Performed by: COLON & RECTAL SURGERY

## 2019-04-18 PROCEDURE — 25000003 PHARM REV CODE 250: Performed by: INTERNAL MEDICINE

## 2019-04-18 RX ORDER — PROMETHAZINE HYDROCHLORIDE 25 MG/1
25 TABLET ORAL EVERY 4 HOURS
COMMUNITY
End: 2021-06-16

## 2019-04-18 RX ORDER — SODIUM CHLORIDE 0.9 % (FLUSH) 0.9 %
10 SYRINGE (ML) INJECTION
Status: DISCONTINUED | OUTPATIENT
Start: 2019-04-18 | End: 2019-04-18 | Stop reason: HOSPADM

## 2019-04-18 RX ORDER — HEPARIN 100 UNIT/ML
500 SYRINGE INTRAVENOUS
Status: DISCONTINUED | OUTPATIENT
Start: 2019-04-18 | End: 2019-04-18 | Stop reason: HOSPADM

## 2019-04-18 RX ADMIN — HEPARIN 500 UNITS: 100 SYRINGE at 03:04

## 2019-04-18 RX ADMIN — Medication 10 ML: at 03:04

## 2019-04-18 NOTE — NURSING
Pt arrived for d/c pump.  Medication completed.  Port flushes easily with blood return.  Port de accessed.  Pt discharged to home.

## 2019-04-18 NOTE — PROGRESS NOTES
CRS Post-operative visit    Visit Info:     Procedure:  1.  Robotic rectosigmoid resection with primary anastomosis.  2.  Robotic splenic flexure mobilization.    Date of Procedure: February 15, 2019    Indication: 52 yo F, underwent asymptomatic screening colonoscopy by me 1/10/19 - found to have a circumferential, partially obstructing mass in the rectosigmoid, not able to be traversed with scope, inferior edge at 18-19 cm from anal verge.     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 Although definitive malignancy is not                  identified within biopsy fragments, malignancy cannot be excluded within the lesion from which the              biopsies were obtained. Correlation with endoscopic findings is necessary for full interpretation.     CT C/A/P 1/15/19: (Images personally reviewed.)  -Focal region of thickening of the rectosigmoid colon measuring approximately 1.9 cm in length, which could possibly correspond to the abnormality seen on recent colonoscopy.  There are some adjacent slightly prominent lymph nodes superior to this region of thickening along the course of the inferior mesenteric vessels, measuring approximately 6.5 mm.  Correlation with pathology results is recommended.  -Diverticulosis coli without diverticulitis.  Small hiatal hernia.  -Suspected uterine fibroid.     CEA 3.3    Date of Discharge: February 19, 2019    Current Status:  Doing well postop.  No longer having any post-op pain.  No fevers or chills.  Her appetite is good when she is not getting chemo, no nausea or vomiting.  Her bowel movements are formed- has BM every other day.    Finishing 2nd cycle of FOLFOX, seems to be tolerating it fairly well.    Pathology:   1. COLON,  RECTOSIGMOID, RECTOSIGMOID RESECTION:  Invasive moderately differentiated adenocarcinoma of colon with extension through muscularis propria , see  Tumor Synoptic.  Associated high-grade dysplasia arising in a tubular adenoma.  Serosal adhesions.  No definitive perineural invasion  Focal areas suspicious for lymphovascular invasion.  One of thirteen (1/13) lymph nodes POSITIVE for metastatic adenocarcinoma.  Six (6) pericolic/perirectal tumor deposits present.  Pathologic staging: pT3 N1a    2. COLON, PROXIMAL DONUT, EXCISION:  Benign colonic mucosa.  Negative for adenoma or malignancy.    3. COLON, DISTAL DONUT, EXCISION:  Benign colonic mucosa.  Negative for adenoma or malignancy.    Physical Exam:  General: White female in NAD   Neuro: aaox4   Respiratory: resps even unlabored  Left chest port   Abdomen:  Soft, non distended, non tender, well-healed lap trocar incisions and right lower quadrant extraction site incision  Extremities: Warm dry and intact    Assessment:  Stage III (T3N1M0) Rectosigmoid cancer, status post robotic resection    Plan:  Activity and diet as tolerated.  Continue adjuvant chemotherapy per Oncology.  RTO 3 months  She will need a colonoscopy once she has finished chemotherapy, as the scope was not able to be advanced beyond her rectosigmoid tumor at the initial colonoscopy due to its near obstructing nature.    Zachery Augustin MD, FACS, FASCRS  Senior Staff Surgeon  Department of Colon & Rectal Surgery

## 2019-04-18 NOTE — LETTER
April 18, 2019      Juan Lamb MD  [address] [facility] [facility] [address]  [facility] [address]           [facility] [place]  [address] [facility] [address]  Phone: [phone]  Fax: [phone]          Patient: Irene Galo   MR Number: [id number]   YOB: 1965   Date of Visit: 4/18/2019       Dear Dr. Lamb:    Thank you for referring Irene Galo to me for evaluation. Attached you will find relevant portions of my assessment and plan of care.    If you have questions, please do not hesitate to call me. I look forward to following Irene Galo along with you.    Sincerely,    Zachery Augustin MD    Enclosure  CC:  Sheila Welch MD    If you would like to receive this communication electronically, please contact [email or web address] or [phone] to request more information on [employer] access.    For providers and/or their staff who would like to refer a patient to [facility], please contact us through our one-stop-shop provider referral line, [facility], at [phone].    If you feel you have received this communication in error or would no longer like to receive these types of communications, please e-mail [email or web address]

## 2019-04-30 ENCOUNTER — LAB VISIT (OUTPATIENT)
Dept: LAB | Facility: HOSPITAL | Age: 54
End: 2019-04-30
Attending: INTERNAL MEDICINE
Payer: COMMERCIAL

## 2019-04-30 DIAGNOSIS — C18.9 MALIGNANT NEOPLASM OF COLON, UNSPECIFIED PART OF COLON: Primary | ICD-10-CM

## 2019-04-30 DIAGNOSIS — C18.7 MALIGNANT NEOPLASM OF SIGMOID COLON: ICD-10-CM

## 2019-04-30 LAB
ALBUMIN SERPL BCP-MCNC: 3.3 G/DL (ref 3.5–5.2)
ALP SERPL-CCNC: 74 U/L (ref 55–135)
ALT SERPL W/O P-5'-P-CCNC: 21 U/L (ref 10–44)
ANION GAP SERPL CALC-SCNC: 7 MMOL/L (ref 8–16)
AST SERPL-CCNC: 20 U/L (ref 10–40)
BILIRUB SERPL-MCNC: 0.4 MG/DL (ref 0.1–1)
BUN SERPL-MCNC: 10 MG/DL (ref 6–20)
CALCIUM SERPL-MCNC: 9.3 MG/DL (ref 8.7–10.5)
CHLORIDE SERPL-SCNC: 108 MMOL/L (ref 95–110)
CO2 SERPL-SCNC: 27 MMOL/L (ref 23–29)
CREAT SERPL-MCNC: 0.8 MG/DL (ref 0.5–1.4)
ERYTHROCYTE [DISTWIDTH] IN BLOOD BY AUTOMATED COUNT: 14.6 % (ref 11.5–14.5)
EST. GFR  (AFRICAN AMERICAN): >60 ML/MIN/1.73 M^2
EST. GFR  (NON AFRICAN AMERICAN): >60 ML/MIN/1.73 M^2
GLUCOSE SERPL-MCNC: 97 MG/DL (ref 70–110)
HCT VFR BLD AUTO: 36.4 % (ref 37–48.5)
HGB BLD-MCNC: 11.9 G/DL (ref 12–16)
MCH RBC QN AUTO: 29.4 PG (ref 27–31)
MCHC RBC AUTO-ENTMCNC: 32.7 G/DL (ref 32–36)
MCV RBC AUTO: 90 FL (ref 82–98)
NEUTROPHILS # BLD AUTO: 1.3 K/UL (ref 1.8–7.7)
PLATELET # BLD AUTO: 171 K/UL (ref 150–350)
PMV BLD AUTO: 10.5 FL (ref 9.2–12.9)
POTASSIUM SERPL-SCNC: 3.9 MMOL/L (ref 3.5–5.1)
PROT SERPL-MCNC: 6.6 G/DL (ref 6–8.4)
RBC # BLD AUTO: 4.05 M/UL (ref 4–5.4)
SODIUM SERPL-SCNC: 142 MMOL/L (ref 136–145)
WBC # BLD AUTO: 4.71 K/UL (ref 3.9–12.7)

## 2019-04-30 PROCEDURE — 85027 COMPLETE CBC AUTOMATED: CPT

## 2019-04-30 PROCEDURE — 80053 COMPREHEN METABOLIC PANEL: CPT

## 2019-04-30 PROCEDURE — 36415 COLL VENOUS BLD VENIPUNCTURE: CPT

## 2019-05-01 ENCOUNTER — OFFICE VISIT (OUTPATIENT)
Dept: HEMATOLOGY/ONCOLOGY | Facility: CLINIC | Age: 54
End: 2019-05-01
Payer: COMMERCIAL

## 2019-05-01 ENCOUNTER — TELEPHONE (OUTPATIENT)
Dept: HEMATOLOGY/ONCOLOGY | Facility: CLINIC | Age: 54
End: 2019-05-01

## 2019-05-01 ENCOUNTER — INFUSION (OUTPATIENT)
Dept: INFUSION THERAPY | Facility: HOSPITAL | Age: 54
End: 2019-05-01
Attending: INTERNAL MEDICINE
Payer: COMMERCIAL

## 2019-05-01 VITALS
RESPIRATION RATE: 18 BRPM | TEMPERATURE: 98 F | HEIGHT: 65 IN | WEIGHT: 181.88 LBS | HEART RATE: 67 BPM | SYSTOLIC BLOOD PRESSURE: 154 MMHG | BODY MASS INDEX: 30.3 KG/M2 | DIASTOLIC BLOOD PRESSURE: 85 MMHG

## 2019-05-01 VITALS
WEIGHT: 181.88 LBS | OXYGEN SATURATION: 98 % | TEMPERATURE: 98 F | SYSTOLIC BLOOD PRESSURE: 136 MMHG | DIASTOLIC BLOOD PRESSURE: 76 MMHG | HEIGHT: 63 IN | HEART RATE: 63 BPM | RESPIRATION RATE: 18 BRPM | BODY MASS INDEX: 32.23 KG/M2

## 2019-05-01 DIAGNOSIS — C77.9 SECONDARY ADENOCARCINOMA OF LYMPH NODE: ICD-10-CM

## 2019-05-01 DIAGNOSIS — T45.1X5A CHEMOTHERAPY INDUCED NEUTROPENIA: ICD-10-CM

## 2019-05-01 DIAGNOSIS — C18.7 MALIGNANT NEOPLASM OF SIGMOID COLON: Primary | ICD-10-CM

## 2019-05-01 DIAGNOSIS — D70.1 CHEMOTHERAPY INDUCED NEUTROPENIA: ICD-10-CM

## 2019-05-01 PROCEDURE — 36593 DECLOT VASCULAR DEVICE: CPT

## 2019-05-01 PROCEDURE — 99999 PR PBB SHADOW E&M-EST. PATIENT-LVL III: ICD-10-PCS | Mod: PBBFAC,,, | Performed by: INTERNAL MEDICINE

## 2019-05-01 PROCEDURE — 3008F PR BODY MASS INDEX (BMI) DOCUMENTED: ICD-10-PCS | Mod: CPTII,S$GLB,, | Performed by: INTERNAL MEDICINE

## 2019-05-01 PROCEDURE — 99999 PR PBB SHADOW E&M-EST. PATIENT-LVL III: CPT | Mod: PBBFAC,,, | Performed by: INTERNAL MEDICINE

## 2019-05-01 PROCEDURE — 96415 CHEMO IV INFUSION ADDL HR: CPT

## 2019-05-01 PROCEDURE — 99215 OFFICE O/P EST HI 40 MIN: CPT | Mod: S$GLB,,, | Performed by: INTERNAL MEDICINE

## 2019-05-01 PROCEDURE — 96413 CHEMO IV INFUSION 1 HR: CPT

## 2019-05-01 PROCEDURE — 96411 CHEMO IV PUSH ADDL DRUG: CPT

## 2019-05-01 PROCEDURE — 3008F BODY MASS INDEX DOCD: CPT | Mod: CPTII,S$GLB,, | Performed by: INTERNAL MEDICINE

## 2019-05-01 PROCEDURE — 25000003 PHARM REV CODE 250: Performed by: INTERNAL MEDICINE

## 2019-05-01 PROCEDURE — 96368 THER/DIAG CONCURRENT INF: CPT

## 2019-05-01 PROCEDURE — 63600175 PHARM REV CODE 636 W HCPCS: Performed by: INTERNAL MEDICINE

## 2019-05-01 PROCEDURE — 96416 CHEMO PROLONG INFUSE W/PUMP: CPT

## 2019-05-01 PROCEDURE — 99215 PR OFFICE/OUTPT VISIT, EST, LEVL V, 40-54 MIN: ICD-10-PCS | Mod: S$GLB,,, | Performed by: INTERNAL MEDICINE

## 2019-05-01 PROCEDURE — 96367 TX/PROPH/DG ADDL SEQ IV INF: CPT

## 2019-05-01 RX ORDER — HEPARIN 100 UNIT/ML
500 SYRINGE INTRAVENOUS
Status: CANCELLED | OUTPATIENT
Start: 2019-05-02

## 2019-05-01 RX ORDER — DIPHENHYDRAMINE HYDROCHLORIDE 50 MG/ML
50 INJECTION INTRAMUSCULAR; INTRAVENOUS ONCE AS NEEDED
Status: CANCELLED | OUTPATIENT
Start: 2019-05-01

## 2019-05-01 RX ORDER — DIPHENHYDRAMINE HYDROCHLORIDE 50 MG/ML
50 INJECTION INTRAMUSCULAR; INTRAVENOUS ONCE AS NEEDED
Status: DISCONTINUED | OUTPATIENT
Start: 2019-05-01 | End: 2019-05-01 | Stop reason: HOSPADM

## 2019-05-01 RX ORDER — EPINEPHRINE 0.3 MG/.3ML
0.3 INJECTION SUBCUTANEOUS ONCE AS NEEDED
Status: DISCONTINUED | OUTPATIENT
Start: 2019-05-01 | End: 2019-05-01 | Stop reason: HOSPADM

## 2019-05-01 RX ORDER — HEPARIN 100 UNIT/ML
500 SYRINGE INTRAVENOUS
Status: CANCELLED | OUTPATIENT
Start: 2019-05-01

## 2019-05-01 RX ORDER — SODIUM CHLORIDE 0.9 % (FLUSH) 0.9 %
10 SYRINGE (ML) INJECTION
Status: CANCELLED | OUTPATIENT
Start: 2019-05-01

## 2019-05-01 RX ORDER — HEPARIN 100 UNIT/ML
500 SYRINGE INTRAVENOUS
Status: DISCONTINUED | OUTPATIENT
Start: 2019-05-01 | End: 2019-05-01 | Stop reason: HOSPADM

## 2019-05-01 RX ORDER — FLUOROURACIL 50 MG/ML
400 INJECTION, SOLUTION INTRAVENOUS
Status: COMPLETED | OUTPATIENT
Start: 2019-05-01 | End: 2019-05-01

## 2019-05-01 RX ORDER — EPINEPHRINE 0.3 MG/.3ML
0.3 INJECTION SUBCUTANEOUS ONCE AS NEEDED
Status: CANCELLED | OUTPATIENT
Start: 2019-05-01

## 2019-05-01 RX ORDER — FLUOROURACIL 50 MG/ML
400 INJECTION, SOLUTION INTRAVENOUS
Status: CANCELLED | OUTPATIENT
Start: 2019-05-01

## 2019-05-01 RX ORDER — SODIUM CHLORIDE 0.9 % (FLUSH) 0.9 %
10 SYRINGE (ML) INJECTION
Status: CANCELLED | OUTPATIENT
Start: 2019-05-02

## 2019-05-01 RX ORDER — SODIUM CHLORIDE 0.9 % (FLUSH) 0.9 %
10 SYRINGE (ML) INJECTION
Status: DISCONTINUED | OUTPATIENT
Start: 2019-05-01 | End: 2019-05-01 | Stop reason: HOSPADM

## 2019-05-01 RX ADMIN — DEXAMETHASONE SODIUM PHOSPHATE: 4 INJECTION, SOLUTION INTRAMUSCULAR; INTRAVENOUS at 10:05

## 2019-05-01 RX ADMIN — OXALIPLATIN 161 MG: 5 INJECTION, SOLUTION INTRAVENOUS at 10:05

## 2019-05-01 RX ADMIN — FLUOROURACIL 755 MG: 50 INJECTION, SOLUTION INTRAVENOUS at 12:05

## 2019-05-01 RX ADMIN — LEUCOVORIN CALCIUM 755 MG: 50 INJECTION, POWDER, LYOPHILIZED, FOR SOLUTION INTRAMUSCULAR; INTRAVENOUS at 10:05

## 2019-05-01 RX ADMIN — DEXTROSE: 50 INJECTION, SOLUTION INTRAVENOUS at 10:05

## 2019-05-01 RX ADMIN — FLUOROURACIL 4535 MG: 50 INJECTION, SOLUTION INTRAVENOUS at 01:05

## 2019-05-01 RX ADMIN — ALTEPLASE 2 MG: 2.2 INJECTION, POWDER, LYOPHILIZED, FOR SOLUTION INTRAVENOUS at 09:05

## 2019-05-01 NOTE — TELEPHONE ENCOUNTER
----- Message from Sheila Welch MD sent at 5/1/2019  8:44 AM CDT -----  Disregard next chemo follow-up    Schedule CBC,CMp and see me on 5/20/19 and for FOLFOX. DC pump on day 3. neulasta on day 3.    She qualifies for same day

## 2019-05-01 NOTE — PLAN OF CARE
Problem: Fatigue  Goal: Improved Activity Tolerance  Outcome: Ongoing (interventions implemented as appropriate)  Labs , hx, and medications reviewed. Assessment completed. Discussed plan of care with patient. Patient in agreement. VSS.  Chair reclined and warm blanket and snack offered. Will cont to monitor

## 2019-05-01 NOTE — TELEPHONE ENCOUNTER
----- Message from Sheila Welch MD sent at 5/1/2019  8:41 AM CDT -----  Needs neulasta on 5/3, needs auth    Schedule CBC,CMP on 5/18/19     Schedule to see me on 5/20/19 and for FOLFOX. DC pump on day 3. Neulasta on day 3

## 2019-05-01 NOTE — PLAN OF CARE
Problem: Adult Inpatient Plan of Care  Goal: Plan of Care Review  Outcome: Ongoing (interventions implemented as appropriate)  Pt tolerated C3D1 folfox without adverse effects. VSS. Provided OBI video & all questions answered.  Verbalized understanding of RTC date on Friday  At 11. DC with family ambulating independently.

## 2019-05-01 NOTE — Clinical Note
Disregard next chemo follow-upSchedule CBC,CMp and see me on 5/20/19 and for FOLFOX. DC pump on day 3. neulasta on day 3.She qualifies for same day

## 2019-05-01 NOTE — Clinical Note
Needs neulasta on 5/3, needs authSchedule CBC,CMP on 5/18/19 Schedule to see me on 5/20/19 and for FOLFOX. DC pump on day 3. Neulasta on day 3

## 2019-05-01 NOTE — PROGRESS NOTES
Subjective:       Patient ID: Irene Galo is a 53 y.o. female.    Chief Complaint: Malignant neoplasm of sigmoid colon  ONCOLOGIC HISTORY: Ms. Irene Galo is a 53-year-old female who underwent screening colonoscopy and was noted to have a circumferential partially obstructing mass in the rectosigmoid, which could not be traversed with the scope.  Inferior edge was 18 to 19 cm from the anal verge.  This was done on 01/10/2019.  Pathology from that revealed moderate dysplasia.  Malignancy could not be excluded.  She underwent a CT scan on 01/15/2019 that revealed a focal area of thickening in the rectosigmoid colon measuring 1.9 cm in length, which could possibly correspond to the abnormality seen on recent colonoscopy.  There is some  slight prominent lymph node superior to this region of thickening along the course of the inferior mesenteric vessels measuring approximately 6.5 mm.  CEA was 3.3.  She underwent sigmoid colectomy on 02/15/2019.  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           HPI She comes in to receive cycle 3 of adjuvant chemo with FOLFOX  She noted 1 days of fatigue and diarrhea and then resolved. She feels well now   She denies any nausea, vomiting, diarrhea, constipation, abdominal pain, weight loss or loss of appetite, chest pain, shortness of breath, leg swelling, fatigue, pain, headache, dizziness, or mood changes. Her ECOG PS is zero. She is by herself today          Review of Systems   Constitutional: Negative for appetite change and unexpected weight change.   Eyes: Negative for visual disturbance.   Respiratory: Negative for cough and shortness of breath.    Cardiovascular: Negative for chest pain.   Gastrointestinal: Positive for diarrhea. Negative for abdominal pain.   Genitourinary: Negative for frequency.   Musculoskeletal: Negative for back pain.   Skin: Negative for rash.   Neurological: Negative for headaches.   Hematological: Negative for adenopathy.    Psychiatric/Behavioral: The patient is not nervous/anxious.        PMFSH: all information reviewed and updated as relevant to today's visit  Objective:      Physical Exam   Constitutional: She is oriented to person, place, and time. She appears well-developed and well-nourished.   HENT:   Mouth/Throat: No oropharyngeal exudate.   Cardiovascular: Normal rate and normal heart sounds.   Pulmonary/Chest: Effort normal and breath sounds normal. She has no wheezes.   Abdominal: Soft. Bowel sounds are normal. There is no tenderness.   Musculoskeletal: She exhibits no edema or tenderness.   Lymphadenopathy:     She has no cervical adenopathy.   Neurological: She is alert and oriented to person, place, and time. Coordination normal.   Skin: Skin is warm and dry. No rash noted.   Psychiatric: She has a normal mood and affect. Judgment and thought content normal.   Vitals reviewed.      LABS:  WBC   Date Value Ref Range Status   04/30/2019 4.71 3.90 - 12.70 K/uL Final     Hemoglobin   Date Value Ref Range Status   04/30/2019 11.9 (L) 12.0 - 16.0 g/dL Final     Hematocrit   Date Value Ref Range Status   04/30/2019 36.4 (L) 37.0 - 48.5 % Final     Platelets   Date Value Ref Range Status   04/30/2019 171 150 - 350 K/uL Final     Gran # (ANC)   Date Value Ref Range Status   04/30/2019 1.3 (L) 1.8 - 7.7 K/uL Final     Comment:     The ANC is based on a white cell differential from an   automated cell counter. It has not been microscopically   reviewed for the presence of abnormal cells. Clinical   correlation is required.         Chemistry        Component Value Date/Time     04/30/2019 1430    K 3.9 04/30/2019 1430     04/30/2019 1430    CO2 27 04/30/2019 1430    BUN 10 04/30/2019 1430    CREATININE 0.8 04/30/2019 1430    GLU 97 04/30/2019 1430        Component Value Date/Time    CALCIUM 9.3 04/30/2019 1430    ALKPHOS 74 04/30/2019 1430    AST 20 04/30/2019 1430    ALT 21 04/30/2019 1430    BILITOT 0.4 04/30/2019  1430    ESTGFRAFRICA >60 04/30/2019 1430    EGFRNONAA >60 04/30/2019 1430          Assessment:       1. Malignant neoplasm of sigmoid colon    2. Secondary adenocarcinoma of lymph node    3. Chemotherapy induced neutropenia        Plan:        1,2,3. She is doing well clinically and will proceed with cycle 3 of FOLFOX and will return in 2 weeks for next cycle. Add neulasta on day 3 due to chemo induced neutropenia    Above care plan was discussed with patient and all questions were addressed to her satisfaction

## 2019-05-03 ENCOUNTER — INFUSION (OUTPATIENT)
Dept: INFUSION THERAPY | Facility: HOSPITAL | Age: 54
End: 2019-05-03
Attending: INTERNAL MEDICINE
Payer: COMMERCIAL

## 2019-05-03 VITALS
TEMPERATURE: 98 F | DIASTOLIC BLOOD PRESSURE: 89 MMHG | OXYGEN SATURATION: 99 % | SYSTOLIC BLOOD PRESSURE: 145 MMHG | RESPIRATION RATE: 18 BRPM | HEART RATE: 68 BPM

## 2019-05-03 DIAGNOSIS — C18.7 MALIGNANT NEOPLASM OF SIGMOID COLON: Primary | ICD-10-CM

## 2019-05-03 PROCEDURE — 25000003 PHARM REV CODE 250: Performed by: INTERNAL MEDICINE

## 2019-05-03 PROCEDURE — A4216 STERILE WATER/SALINE, 10 ML: HCPCS | Performed by: INTERNAL MEDICINE

## 2019-05-03 PROCEDURE — 96377 APPLICATON ON-BODY INJECTOR: CPT

## 2019-05-03 PROCEDURE — 63600175 PHARM REV CODE 636 W HCPCS: Performed by: INTERNAL MEDICINE

## 2019-05-03 PROCEDURE — 96523 IRRIG DRUG DELIVERY DEVICE: CPT

## 2019-05-03 RX ORDER — SODIUM CHLORIDE 0.9 % (FLUSH) 0.9 %
10 SYRINGE (ML) INJECTION
Status: DISCONTINUED | OUTPATIENT
Start: 2019-05-03 | End: 2019-05-03 | Stop reason: HOSPADM

## 2019-05-03 RX ORDER — HEPARIN 100 UNIT/ML
500 SYRINGE INTRAVENOUS
Status: DISCONTINUED | OUTPATIENT
Start: 2019-05-03 | End: 2019-05-03 | Stop reason: HOSPADM

## 2019-05-03 RX ADMIN — HEPARIN 500 UNITS: 100 SYRINGE at 11:05

## 2019-05-03 RX ADMIN — SODIUM CHLORIDE, PRESERVATIVE FREE 10 ML: 5 INJECTION INTRAVENOUS at 11:05

## 2019-05-03 RX ADMIN — PEGFILGRASTIM 6 MG: KIT SUBCUTANEOUS at 11:05

## 2019-05-03 NOTE — PLAN OF CARE
Problem: Adult Inpatient Plan of Care  Goal: Plan of Care Review  Outcome: Ongoing (interventions implemented as appropriate)  Pt admitted @ 11 am for 5FU pump DC and placement of Neulasta OBI. Port flushed , heparinized and removed. Neulasta  OBI placed on abdomen @ 11:13, and Pt educated on need for and it's function. Plan of care reviewed  and Pt instructed to contact MD and remove Neulasta device tomorrow at 3:30 pm, she verbalize understanding Pt discharged @ 11:20.

## 2019-05-20 ENCOUNTER — OFFICE VISIT (OUTPATIENT)
Dept: HEMATOLOGY/ONCOLOGY | Facility: CLINIC | Age: 54
End: 2019-05-20
Payer: COMMERCIAL

## 2019-05-20 ENCOUNTER — LAB VISIT (OUTPATIENT)
Dept: LAB | Facility: HOSPITAL | Age: 54
End: 2019-05-20
Attending: INTERNAL MEDICINE
Payer: COMMERCIAL

## 2019-05-20 ENCOUNTER — INFUSION (OUTPATIENT)
Dept: INFUSION THERAPY | Facility: HOSPITAL | Age: 54
End: 2019-05-20
Attending: INTERNAL MEDICINE
Payer: COMMERCIAL

## 2019-05-20 VITALS
HEART RATE: 64 BPM | WEIGHT: 181.44 LBS | BODY MASS INDEX: 32.15 KG/M2 | OXYGEN SATURATION: 99 % | RESPIRATION RATE: 16 BRPM | DIASTOLIC BLOOD PRESSURE: 74 MMHG | TEMPERATURE: 98 F | HEIGHT: 63 IN | SYSTOLIC BLOOD PRESSURE: 132 MMHG

## 2019-05-20 VITALS
HEART RATE: 66 BPM | SYSTOLIC BLOOD PRESSURE: 150 MMHG | TEMPERATURE: 98 F | OXYGEN SATURATION: 99 % | DIASTOLIC BLOOD PRESSURE: 78 MMHG | RESPIRATION RATE: 18 BRPM

## 2019-05-20 DIAGNOSIS — D70.1 CHEMOTHERAPY INDUCED NEUTROPENIA: ICD-10-CM

## 2019-05-20 DIAGNOSIS — C77.9 SECONDARY ADENOCARCINOMA OF LYMPH NODE: ICD-10-CM

## 2019-05-20 DIAGNOSIS — T45.1X5A CHEMOTHERAPY INDUCED NEUTROPENIA: ICD-10-CM

## 2019-05-20 DIAGNOSIS — C18.7 MALIGNANT NEOPLASM OF SIGMOID COLON: ICD-10-CM

## 2019-05-20 DIAGNOSIS — C18.7 MALIGNANT NEOPLASM OF SIGMOID COLON: Primary | ICD-10-CM

## 2019-05-20 LAB
ALBUMIN SERPL BCP-MCNC: 3.2 G/DL (ref 3.5–5.2)
ALP SERPL-CCNC: 88 U/L (ref 55–135)
ALT SERPL W/O P-5'-P-CCNC: 39 U/L (ref 10–44)
ANION GAP SERPL CALC-SCNC: 7 MMOL/L (ref 8–16)
AST SERPL-CCNC: 34 U/L (ref 10–40)
BILIRUB SERPL-MCNC: 0.5 MG/DL (ref 0.1–1)
BUN SERPL-MCNC: 13 MG/DL (ref 6–20)
CALCIUM SERPL-MCNC: 9.7 MG/DL (ref 8.7–10.5)
CHLORIDE SERPL-SCNC: 109 MMOL/L (ref 95–110)
CO2 SERPL-SCNC: 26 MMOL/L (ref 23–29)
CREAT SERPL-MCNC: 0.8 MG/DL (ref 0.5–1.4)
ERYTHROCYTE [DISTWIDTH] IN BLOOD BY AUTOMATED COUNT: 16.1 % (ref 11.5–14.5)
EST. GFR  (AFRICAN AMERICAN): >60 ML/MIN/1.73 M^2
EST. GFR  (NON AFRICAN AMERICAN): >60 ML/MIN/1.73 M^2
GLUCOSE SERPL-MCNC: 101 MG/DL (ref 70–110)
HCT VFR BLD AUTO: 40.1 % (ref 37–48.5)
HGB BLD-MCNC: 12.9 G/DL (ref 12–16)
IMM GRANULOCYTES # BLD AUTO: 0.04 K/UL (ref 0–0.04)
MCH RBC QN AUTO: 29.7 PG (ref 27–31)
MCHC RBC AUTO-ENTMCNC: 32.2 G/DL (ref 32–36)
MCV RBC AUTO: 92 FL (ref 82–98)
NEUTROPHILS # BLD AUTO: 4.9 K/UL (ref 1.8–7.7)
PLATELET # BLD AUTO: 156 K/UL (ref 150–350)
PMV BLD AUTO: 11.9 FL (ref 9.2–12.9)
POTASSIUM SERPL-SCNC: 3.7 MMOL/L (ref 3.5–5.1)
PROT SERPL-MCNC: 6.7 G/DL (ref 6–8.4)
RBC # BLD AUTO: 4.34 M/UL (ref 4–5.4)
SODIUM SERPL-SCNC: 142 MMOL/L (ref 136–145)
WBC # BLD AUTO: 8.51 K/UL (ref 3.9–12.7)

## 2019-05-20 PROCEDURE — 99999 PR PBB SHADOW E&M-EST. PATIENT-LVL III: ICD-10-PCS | Mod: PBBFAC,,, | Performed by: INTERNAL MEDICINE

## 2019-05-20 PROCEDURE — 85027 COMPLETE CBC AUTOMATED: CPT

## 2019-05-20 PROCEDURE — 96415 CHEMO IV INFUSION ADDL HR: CPT

## 2019-05-20 PROCEDURE — 99215 OFFICE O/P EST HI 40 MIN: CPT | Mod: S$GLB,,, | Performed by: INTERNAL MEDICINE

## 2019-05-20 PROCEDURE — 3008F BODY MASS INDEX DOCD: CPT | Mod: CPTII,S$GLB,, | Performed by: INTERNAL MEDICINE

## 2019-05-20 PROCEDURE — 96411 CHEMO IV PUSH ADDL DRUG: CPT

## 2019-05-20 PROCEDURE — 63600175 PHARM REV CODE 636 W HCPCS: Performed by: INTERNAL MEDICINE

## 2019-05-20 PROCEDURE — 80053 COMPREHEN METABOLIC PANEL: CPT

## 2019-05-20 PROCEDURE — 99215 PR OFFICE/OUTPT VISIT, EST, LEVL V, 40-54 MIN: ICD-10-PCS | Mod: S$GLB,,, | Performed by: INTERNAL MEDICINE

## 2019-05-20 PROCEDURE — 96413 CHEMO IV INFUSION 1 HR: CPT

## 2019-05-20 PROCEDURE — 25000003 PHARM REV CODE 250: Performed by: INTERNAL MEDICINE

## 2019-05-20 PROCEDURE — 3008F PR BODY MASS INDEX (BMI) DOCUMENTED: ICD-10-PCS | Mod: CPTII,S$GLB,, | Performed by: INTERNAL MEDICINE

## 2019-05-20 PROCEDURE — 96367 TX/PROPH/DG ADDL SEQ IV INF: CPT

## 2019-05-20 PROCEDURE — 96416 CHEMO PROLONG INFUSE W/PUMP: CPT

## 2019-05-20 PROCEDURE — 99999 PR PBB SHADOW E&M-EST. PATIENT-LVL III: CPT | Mod: PBBFAC,,, | Performed by: INTERNAL MEDICINE

## 2019-05-20 PROCEDURE — 36415 COLL VENOUS BLD VENIPUNCTURE: CPT

## 2019-05-20 PROCEDURE — 96368 THER/DIAG CONCURRENT INF: CPT

## 2019-05-20 RX ORDER — HEPARIN 100 UNIT/ML
500 SYRINGE INTRAVENOUS
Status: DISCONTINUED | OUTPATIENT
Start: 2019-05-20 | End: 2019-05-20 | Stop reason: HOSPADM

## 2019-05-20 RX ORDER — FLUOROURACIL 50 MG/ML
400 INJECTION, SOLUTION INTRAVENOUS
Status: COMPLETED | OUTPATIENT
Start: 2019-05-20 | End: 2019-05-20

## 2019-05-20 RX ORDER — DIPHENHYDRAMINE HYDROCHLORIDE 50 MG/ML
50 INJECTION INTRAMUSCULAR; INTRAVENOUS ONCE AS NEEDED
Status: DISCONTINUED | OUTPATIENT
Start: 2019-05-20 | End: 2019-05-20 | Stop reason: HOSPADM

## 2019-05-20 RX ORDER — SODIUM CHLORIDE 0.9 % (FLUSH) 0.9 %
10 SYRINGE (ML) INJECTION
Status: CANCELLED | OUTPATIENT
Start: 2019-05-20

## 2019-05-20 RX ORDER — SODIUM CHLORIDE 0.9 % (FLUSH) 0.9 %
10 SYRINGE (ML) INJECTION
Status: CANCELLED | OUTPATIENT
Start: 2019-05-22

## 2019-05-20 RX ORDER — DIPHENHYDRAMINE HYDROCHLORIDE 50 MG/ML
50 INJECTION INTRAMUSCULAR; INTRAVENOUS ONCE AS NEEDED
Status: CANCELLED | OUTPATIENT
Start: 2019-05-20

## 2019-05-20 RX ORDER — SODIUM CHLORIDE 0.9 % (FLUSH) 0.9 %
10 SYRINGE (ML) INJECTION
Status: DISCONTINUED | OUTPATIENT
Start: 2019-05-20 | End: 2019-05-20 | Stop reason: HOSPADM

## 2019-05-20 RX ORDER — HEPARIN 100 UNIT/ML
500 SYRINGE INTRAVENOUS
Status: CANCELLED | OUTPATIENT
Start: 2019-05-20

## 2019-05-20 RX ORDER — FLUOROURACIL 50 MG/ML
400 INJECTION, SOLUTION INTRAVENOUS
Status: CANCELLED | OUTPATIENT
Start: 2019-05-20

## 2019-05-20 RX ORDER — EPINEPHRINE 0.3 MG/.3ML
0.3 INJECTION SUBCUTANEOUS ONCE AS NEEDED
Status: CANCELLED | OUTPATIENT
Start: 2019-05-20

## 2019-05-20 RX ORDER — HEPARIN 100 UNIT/ML
500 SYRINGE INTRAVENOUS
Status: CANCELLED | OUTPATIENT
Start: 2019-05-22

## 2019-05-20 RX ORDER — EPINEPHRINE 0.3 MG/.3ML
0.3 INJECTION SUBCUTANEOUS ONCE AS NEEDED
Status: DISCONTINUED | OUTPATIENT
Start: 2019-05-20 | End: 2019-05-20 | Stop reason: HOSPADM

## 2019-05-20 RX ADMIN — FLUOROURACIL 4535 MG: 50 INJECTION, SOLUTION INTRAVENOUS at 11:05

## 2019-05-20 RX ADMIN — DEXTROSE MONOHYDRATE: 5 INJECTION, SOLUTION INTRAVENOUS at 09:05

## 2019-05-20 RX ADMIN — LEUCOVORIN CALCIUM 755 MG: 350 INJECTION, POWDER, LYOPHILIZED, FOR SOLUTION INTRAMUSCULAR; INTRAVENOUS at 09:05

## 2019-05-20 RX ADMIN — OXALIPLATIN 161 MG: 100 INJECTION, SOLUTION, CONCENTRATE INTRAVENOUS at 09:05

## 2019-05-20 RX ADMIN — DEXAMETHASONE SODIUM PHOSPHATE: 4 INJECTION, SOLUTION INTRAMUSCULAR; INTRAVENOUS at 08:05

## 2019-05-20 RX ADMIN — SODIUM CHLORIDE: 9 INJECTION, SOLUTION INTRAVENOUS at 08:05

## 2019-05-20 RX ADMIN — FLUOROURACIL 755 MG: 50 INJECTION, SOLUTION INTRAVENOUS at 11:05

## 2019-05-20 NOTE — PROGRESS NOTES
Subjective:       Patient ID: Irene Galo is a 53 y.o. female.    Chief Complaint: Colon Cancer  ONCOLOGIC HISTORY: Ms. Irene Galo is a 53-year-old female who underwent screening colonoscopy and was noted to have a circumferential partially obstructing mass in the rectosigmoid, which could not be traversed with the scope.  Inferior edge was 18 to 19 cm from the anal verge.  This was done on 01/10/2019.  Pathology from that revealed moderate dysplasia.  Malignancy could not be excluded.  She underwent a CT scan on 01/15/2019 that revealed a focal area of thickening in the rectosigmoid colon measuring 1.9 cm in length, which could possibly correspond to the abnormality seen on recent colonoscopy.  There is some  slight prominent lymph node superior to this region of thickening along the course of the inferior mesenteric vessels measuring approximately 6.5 mm.  CEA was 3.3.  She underwent sigmoid colectomy on 02/15/2019.  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            HPI She comes in to receive cycle 4 of adjuvant chemo with FOLFOX  She noted diarrhea X 4 episodes on mother's day and resolved with imodium. She notes cold sensitivity starting day of chemo and lasts for 4 days after     She denies any nausea, vomiting, diarrhea, constipation, abdominal pain, mouth sores, tingling and numbness in hands and feet, weight loss or loss of appetite, chest pain, shortness of breath, leg swelling, fatigue, pain, headache, dizziness, or mood changes. Her ECOG PS is zero         Review of Systems   Constitutional: Negative for appetite change and unexpected weight change.   Eyes: Negative for visual disturbance.   Respiratory: Negative for cough and shortness of breath.    Cardiovascular: Negative for chest pain.   Gastrointestinal: Positive for diarrhea. Negative for abdominal pain.   Genitourinary: Negative for frequency.   Musculoskeletal: Positive for back pain.   Skin: Negative for rash.    Neurological: Negative for headaches.   Hematological: Negative for adenopathy.   Psychiatric/Behavioral: The patient is not nervous/anxious.        Objective:      Physical Exam   Constitutional: She is oriented to person, place, and time. She appears well-developed and well-nourished.   HENT:   Mouth/Throat: No oropharyngeal exudate.   Cardiovascular: Normal rate and normal heart sounds.   Pulmonary/Chest: Effort normal and breath sounds normal. She has no wheezes.   Abdominal: Soft. Bowel sounds are normal. There is no tenderness.   Musculoskeletal: She exhibits no edema or tenderness.   Lymphadenopathy:     She has no cervical adenopathy.   Neurological: She is alert and oriented to person, place, and time. Coordination normal.   Skin: Skin is warm and dry. No rash noted.   Psychiatric: She has a normal mood and affect. Judgment and thought content normal.   Vitals reviewed.        LABS:  WBC   Date Value Ref Range Status   05/20/2019 8.51 3.90 - 12.70 K/uL Final     Hemoglobin   Date Value Ref Range Status   05/20/2019 12.9 12.0 - 16.0 g/dL Final     Hematocrit   Date Value Ref Range Status   05/20/2019 40.1 37.0 - 48.5 % Final     Platelets   Date Value Ref Range Status   05/20/2019 156 150 - 350 K/uL Final     Gran # (ANC)   Date Value Ref Range Status   05/20/2019 4.9 1.8 - 7.7 K/uL Final     Comment:     The ANC is based on a white cell differential from an   automated cell counter. It has not been microscopically   reviewed for the presence of abnormal cells. Clinical   correlation is required.         Chemistry        Component Value Date/Time     05/20/2019 0700    K 3.7 05/20/2019 0700     05/20/2019 0700    CO2 26 05/20/2019 0700    BUN 13 05/20/2019 0700    CREATININE 0.8 05/20/2019 0700     05/20/2019 0700        Component Value Date/Time    CALCIUM 9.7 05/20/2019 0700    ALKPHOS 88 05/20/2019 0700    AST 34 05/20/2019 0700    ALT 39 05/20/2019 0700    BILITOT 0.5 05/20/2019 0700     ESTGFRAFRICA >60.0 05/20/2019 0700    EGFRNONAA >60.0 05/20/2019 0700          Assessment:       1. Malignant neoplasm of sigmoid colon    2. Secondary adenocarcinoma of lymph node    3. Chemotherapy induced neutropenia        Plan:        1,2,3. She is doing well clinically and will proceed with cycle 4 of adjuvant chemo with FOLFOX and will return in 2 weeks for next cycle. Neulasta on day 3    Above care plan was discussed with patient and all questions were addressed to her satisfaction

## 2019-05-20 NOTE — PLAN OF CARE
Problem: Adult Inpatient Plan of Care  Goal: Plan of Care Review  Outcome: Ongoing (interventions implemented as appropriate)  Pt admitted for D1C4 Folfox. Side effects and self-care tips discussed, Pt tolerating tx well, only report fatigue. Labs reviewed and pt received treatment. Plan of care was reviewed with Pt and Pt was instructed to contact MD with any further questions or concerns, she verbalized understanding. 5FU pump begun @ 11:39, Pt told to RTC Wednesday 5/22 @ 9:30 for pump DC . Pt discharged @ 11:45

## 2019-05-20 NOTE — Clinical Note
Schedule CBC,CMP, and see me in 2 weeks and for FOLFOX Mondays). DC pump on day 3. Neulasta on day 3

## 2019-05-22 ENCOUNTER — INFUSION (OUTPATIENT)
Dept: INFUSION THERAPY | Facility: HOSPITAL | Age: 54
End: 2019-05-22
Attending: INTERNAL MEDICINE
Payer: COMMERCIAL

## 2019-05-22 DIAGNOSIS — C18.7 MALIGNANT NEOPLASM OF SIGMOID COLON: Primary | ICD-10-CM

## 2019-05-22 PROCEDURE — 63600175 PHARM REV CODE 636 W HCPCS: Performed by: INTERNAL MEDICINE

## 2019-05-22 PROCEDURE — 96377 APPLICATON ON-BODY INJECTOR: CPT

## 2019-05-22 PROCEDURE — 25000003 PHARM REV CODE 250: Performed by: INTERNAL MEDICINE

## 2019-05-22 PROCEDURE — A4216 STERILE WATER/SALINE, 10 ML: HCPCS | Performed by: INTERNAL MEDICINE

## 2019-05-22 RX ORDER — HEPARIN 100 UNIT/ML
500 SYRINGE INTRAVENOUS
Status: DISCONTINUED | OUTPATIENT
Start: 2019-05-22 | End: 2019-05-22 | Stop reason: HOSPADM

## 2019-05-22 RX ORDER — SODIUM CHLORIDE 0.9 % (FLUSH) 0.9 %
10 SYRINGE (ML) INJECTION
Status: DISCONTINUED | OUTPATIENT
Start: 2019-05-22 | End: 2019-05-22 | Stop reason: HOSPADM

## 2019-05-22 RX ADMIN — Medication 10 ML: at 10:05

## 2019-05-22 RX ADMIN — HEPARIN 500 UNITS: 100 SYRINGE at 10:05

## 2019-05-22 RX ADMIN — PEGFILGRASTIM 6 MG: KIT SUBCUTANEOUS at 10:05

## 2019-05-22 NOTE — NURSING
1000  Pt here for pump d/c, PAC flush, Neulasta OBI, no new complaints or concerns at present; ambulatory infusion completed, pt tolerated well; Neulasta OBI applied to [facility] at 1030, discussed operation, troubleshooting, home care, when med delivery will start (Thurs 5/23/19 at approx 1330); pt declined printed AVS, verbalized understanding of all discussed and when to report next

## 2019-06-03 ENCOUNTER — INFUSION (OUTPATIENT)
Dept: INFUSION THERAPY | Facility: HOSPITAL | Age: 54
End: 2019-06-03
Attending: INTERNAL MEDICINE
Payer: COMMERCIAL

## 2019-06-03 ENCOUNTER — LAB VISIT (OUTPATIENT)
Dept: LAB | Facility: HOSPITAL | Age: 54
End: 2019-06-03
Attending: INTERNAL MEDICINE
Payer: COMMERCIAL

## 2019-06-03 ENCOUNTER — OFFICE VISIT (OUTPATIENT)
Dept: HEMATOLOGY/ONCOLOGY | Facility: CLINIC | Age: 54
End: 2019-06-03
Payer: COMMERCIAL

## 2019-06-03 VITALS
OXYGEN SATURATION: 98 % | TEMPERATURE: 98 F | DIASTOLIC BLOOD PRESSURE: 85 MMHG | RESPIRATION RATE: 16 BRPM | HEART RATE: 63 BPM | BODY MASS INDEX: 31.92 KG/M2 | SYSTOLIC BLOOD PRESSURE: 147 MMHG | WEIGHT: 180.13 LBS | HEIGHT: 63 IN

## 2019-06-03 VITALS
TEMPERATURE: 98 F | SYSTOLIC BLOOD PRESSURE: 125 MMHG | DIASTOLIC BLOOD PRESSURE: 76 MMHG | HEART RATE: 63 BPM | RESPIRATION RATE: 18 BRPM

## 2019-06-03 DIAGNOSIS — C18.7 MALIGNANT NEOPLASM OF SIGMOID COLON: Primary | ICD-10-CM

## 2019-06-03 DIAGNOSIS — I99.8: ICD-10-CM

## 2019-06-03 DIAGNOSIS — C18.7 MALIGNANT NEOPLASM OF SIGMOID COLON: ICD-10-CM

## 2019-06-03 DIAGNOSIS — C77.9 SECONDARY ADENOCARCINOMA OF LYMPH NODE: ICD-10-CM

## 2019-06-03 LAB
ALBUMIN SERPL BCP-MCNC: 3.2 G/DL (ref 3.5–5.2)
ALP SERPL-CCNC: 101 U/L (ref 55–135)
ALT SERPL W/O P-5'-P-CCNC: 18 U/L (ref 10–44)
ANION GAP SERPL CALC-SCNC: 7 MMOL/L (ref 8–16)
AST SERPL-CCNC: 23 U/L (ref 10–40)
BILIRUB SERPL-MCNC: 0.3 MG/DL (ref 0.1–1)
BUN SERPL-MCNC: 16 MG/DL (ref 6–20)
CALCIUM SERPL-MCNC: 9.6 MG/DL (ref 8.7–10.5)
CHLORIDE SERPL-SCNC: 110 MMOL/L (ref 95–110)
CO2 SERPL-SCNC: 26 MMOL/L (ref 23–29)
CREAT SERPL-MCNC: 0.8 MG/DL (ref 0.5–1.4)
ERYTHROCYTE [DISTWIDTH] IN BLOOD BY AUTOMATED COUNT: 17.2 % (ref 11.5–14.5)
EST. GFR  (AFRICAN AMERICAN): >60 ML/MIN/1.73 M^2
EST. GFR  (NON AFRICAN AMERICAN): >60 ML/MIN/1.73 M^2
GLUCOSE SERPL-MCNC: 109 MG/DL (ref 70–110)
HCT VFR BLD AUTO: 38.5 % (ref 37–48.5)
HGB BLD-MCNC: 12.4 G/DL (ref 12–16)
IMM GRANULOCYTES # BLD AUTO: 0.25 K/UL (ref 0–0.04)
MCH RBC QN AUTO: 30.2 PG (ref 27–31)
MCHC RBC AUTO-ENTMCNC: 32.2 G/DL (ref 32–36)
MCV RBC AUTO: 94 FL (ref 82–98)
NEUTROPHILS # BLD AUTO: 7.5 K/UL (ref 1.8–7.7)
PLATELET # BLD AUTO: 153 K/UL (ref 150–350)
PMV BLD AUTO: 10.8 FL (ref 9.2–12.9)
POTASSIUM SERPL-SCNC: 4 MMOL/L (ref 3.5–5.1)
PROT SERPL-MCNC: 6.8 G/DL (ref 6–8.4)
RBC # BLD AUTO: 4.11 M/UL (ref 4–5.4)
SODIUM SERPL-SCNC: 143 MMOL/L (ref 136–145)
WBC # BLD AUTO: 11.79 K/UL (ref 3.9–12.7)

## 2019-06-03 PROCEDURE — 96367 TX/PROPH/DG ADDL SEQ IV INF: CPT

## 2019-06-03 PROCEDURE — 96368 THER/DIAG CONCURRENT INF: CPT

## 2019-06-03 PROCEDURE — 96413 CHEMO IV INFUSION 1 HR: CPT

## 2019-06-03 PROCEDURE — 99999 PR PBB SHADOW E&M-EST. PATIENT-LVL IV: ICD-10-PCS | Mod: PBBFAC,,, | Performed by: INTERNAL MEDICINE

## 2019-06-03 PROCEDURE — 96366 THER/PROPH/DIAG IV INF ADDON: CPT

## 2019-06-03 PROCEDURE — 96416 CHEMO PROLONG INFUSE W/PUMP: CPT

## 2019-06-03 PROCEDURE — 25000003 PHARM REV CODE 250: Performed by: INTERNAL MEDICINE

## 2019-06-03 PROCEDURE — 3008F PR BODY MASS INDEX (BMI) DOCUMENTED: ICD-10-PCS | Mod: CPTII,S$GLB,, | Performed by: INTERNAL MEDICINE

## 2019-06-03 PROCEDURE — 99215 PR OFFICE/OUTPT VISIT, EST, LEVL V, 40-54 MIN: ICD-10-PCS | Mod: S$GLB,,, | Performed by: INTERNAL MEDICINE

## 2019-06-03 PROCEDURE — 85027 COMPLETE CBC AUTOMATED: CPT

## 2019-06-03 PROCEDURE — 96411 CHEMO IV PUSH ADDL DRUG: CPT

## 2019-06-03 PROCEDURE — 99215 OFFICE O/P EST HI 40 MIN: CPT | Mod: S$GLB,,, | Performed by: INTERNAL MEDICINE

## 2019-06-03 PROCEDURE — 3008F BODY MASS INDEX DOCD: CPT | Mod: CPTII,S$GLB,, | Performed by: INTERNAL MEDICINE

## 2019-06-03 PROCEDURE — 63600175 PHARM REV CODE 636 W HCPCS: Performed by: INTERNAL MEDICINE

## 2019-06-03 PROCEDURE — 99999 PR PBB SHADOW E&M-EST. PATIENT-LVL IV: CPT | Mod: PBBFAC,,, | Performed by: INTERNAL MEDICINE

## 2019-06-03 PROCEDURE — 80053 COMPREHEN METABOLIC PANEL: CPT

## 2019-06-03 PROCEDURE — 36415 COLL VENOUS BLD VENIPUNCTURE: CPT

## 2019-06-03 PROCEDURE — 96415 CHEMO IV INFUSION ADDL HR: CPT

## 2019-06-03 RX ORDER — HEPARIN 100 UNIT/ML
500 SYRINGE INTRAVENOUS
Status: CANCELLED | OUTPATIENT
Start: 2019-06-05

## 2019-06-03 RX ORDER — EPINEPHRINE 0.3 MG/.3ML
0.3 INJECTION SUBCUTANEOUS ONCE AS NEEDED
Status: CANCELLED | OUTPATIENT
Start: 2019-06-03

## 2019-06-03 RX ORDER — HEPARIN 100 UNIT/ML
500 SYRINGE INTRAVENOUS
Status: CANCELLED | OUTPATIENT
Start: 2019-06-03

## 2019-06-03 RX ORDER — SODIUM CHLORIDE 0.9 % (FLUSH) 0.9 %
10 SYRINGE (ML) INJECTION
Status: CANCELLED | OUTPATIENT
Start: 2019-06-03

## 2019-06-03 RX ORDER — DIPHENHYDRAMINE HYDROCHLORIDE 50 MG/ML
50 INJECTION INTRAMUSCULAR; INTRAVENOUS ONCE AS NEEDED
Status: DISCONTINUED | OUTPATIENT
Start: 2019-06-03 | End: 2019-06-03 | Stop reason: HOSPADM

## 2019-06-03 RX ORDER — FLUOROURACIL 50 MG/ML
400 INJECTION, SOLUTION INTRAVENOUS
Status: CANCELLED | OUTPATIENT
Start: 2019-06-03

## 2019-06-03 RX ORDER — SODIUM CHLORIDE 0.9 % (FLUSH) 0.9 %
10 SYRINGE (ML) INJECTION
Status: DISCONTINUED | OUTPATIENT
Start: 2019-06-03 | End: 2019-06-03 | Stop reason: HOSPADM

## 2019-06-03 RX ORDER — FLUOROURACIL 50 MG/ML
400 INJECTION, SOLUTION INTRAVENOUS
Status: COMPLETED | OUTPATIENT
Start: 2019-06-03 | End: 2019-06-03

## 2019-06-03 RX ORDER — EPINEPHRINE 0.3 MG/.3ML
0.3 INJECTION SUBCUTANEOUS ONCE AS NEEDED
Status: DISCONTINUED | OUTPATIENT
Start: 2019-06-03 | End: 2019-06-03 | Stop reason: HOSPADM

## 2019-06-03 RX ORDER — DIPHENHYDRAMINE HYDROCHLORIDE 50 MG/ML
50 INJECTION INTRAMUSCULAR; INTRAVENOUS ONCE AS NEEDED
Status: CANCELLED | OUTPATIENT
Start: 2019-06-03

## 2019-06-03 RX ORDER — SODIUM CHLORIDE 0.9 % (FLUSH) 0.9 %
10 SYRINGE (ML) INJECTION
Status: CANCELLED | OUTPATIENT
Start: 2019-06-05

## 2019-06-03 RX ORDER — HEPARIN 100 UNIT/ML
500 SYRINGE INTRAVENOUS
Status: DISCONTINUED | OUTPATIENT
Start: 2019-06-03 | End: 2019-06-03 | Stop reason: HOSPADM

## 2019-06-03 RX ADMIN — OXALIPLATIN 161 MG: 5 INJECTION, SOLUTION INTRAVENOUS at 09:06

## 2019-06-03 RX ADMIN — DEXTROSE: 50 INJECTION, SOLUTION INTRAVENOUS at 08:06

## 2019-06-03 RX ADMIN — LEUCOVORIN CALCIUM 755 MG: 50 INJECTION, POWDER, LYOPHILIZED, FOR SOLUTION INTRAMUSCULAR; INTRAVENOUS at 09:06

## 2019-06-03 RX ADMIN — DEXAMETHASONE SODIUM PHOSPHATE: 4 INJECTION, SOLUTION INTRAMUSCULAR; INTRAVENOUS at 08:06

## 2019-06-03 RX ADMIN — FLUOROURACIL 4535 MG: 50 INJECTION, SOLUTION INTRAVENOUS at 12:06

## 2019-06-03 RX ADMIN — FLUOROURACIL 755 MG: 50 INJECTION, SOLUTION INTRAVENOUS at 12:06

## 2019-06-03 NOTE — PLAN OF CARE
Problem: Nausea and Vomiting (Chemotherapy Effects)  Goal: Fluid and Electrolyte Balance    Intervention: Prevent and Manage Nausea and Vomiting  FOLFOX infusion administered and tolerated well, no adverse reactions noted. 5-fu infusing via PAC at 2.2 ml/hr over 46hr per home infusion pump settings verified, lines secured dressing re enforced per pt preference. Scheduled to rtn to clinic on 6/5/19 at approx. 10 am for pump d/c. Education on trouble shooting re enforced. Instructed to contact MD office with questions or concerns. avs declined. Leaves clinic ambulatory accompanied by spouse in stable condition.

## 2019-06-03 NOTE — Clinical Note
Ultrasound upper extremity today Schedule CBC,CMp and see me in 2 weeks and for FOLFOX. DC pump on day 3. neulasta on day 3

## 2019-06-03 NOTE — PROGRESS NOTES
Subjective:       Patient ID: Irene Galo is a 53 y.o. female.    Chief Complaint: Malignant neoplasm of sigmoid colon  ONCOLOGIC HISTORY: Ms. Irene Galo is a 53-year-old female who underwent screening colonoscopy and was noted to have a circumferential partially obstructing mass in the rectosigmoid, which could not be traversed with the scope.  Inferior edge was 18 to 19 cm from the anal verge.  This was done on 01/10/2019.  Pathology from that revealed moderate dysplasia.  Malignancy could not be excluded.  She underwent a CT scan on 01/15/2019 that revealed a focal area of thickening in the rectosigmoid colon measuring 1.9 cm in length, which could possibly correspond to the abnormality seen on recent colonoscopy.  There is some  slight prominent lymph node superior to this region of thickening along the course of the inferior mesenteric vessels measuring approximately 6.5 mm.  CEA was 3.3.  She underwent sigmoid colectomy on 02/15/2019.  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              HPI She comes in to receive cycle 5 of adjuvant chemo with FOLFOX  She denies any nausea, vomiting, diarrhea, constipation, abdominal pain, weight loss or loss of appetite, chest pain, shortness of breath, leg swelling, fatigue, pain, headache, dizziness, or mood changes. Her ECOG PS is zero. She is accompanied by her          Review of Systems   Constitutional: Negative for appetite change and unexpected weight change.   Eyes: Negative for visual disturbance.   Respiratory: Negative for cough and shortness of breath.    Cardiovascular: Negative for chest pain.   Gastrointestinal: Positive for diarrhea. Negative for abdominal pain.   Genitourinary: Negative for frequency.   Musculoskeletal: Negative for back pain.   Skin: Negative for rash.   Neurological: Negative for headaches.   Hematological: Negative for adenopathy.   Psychiatric/Behavioral: The patient is not nervous/anxious.         [facility]: all information reviewed and updated as relevant to today's visit  Objective:      Physical Exam   Constitutional: She is oriented to person, place, and time. She appears well-developed and well-nourished.   HENT:   Mouth/Throat: No oropharyngeal exudate.   Cardiovascular: Normal rate and normal heart sounds.   Pulmonary/Chest: Effort normal and breath sounds normal. She has no wheezes.   Abdominal: Soft. Bowel sounds are normal. There is no tenderness.   Musculoskeletal: She exhibits no edema or tenderness.   Lymphadenopathy:     She has no cervical adenopathy.   Neurological: She is alert and oriented to person, place, and time. Coordination normal.   Skin: Skin is warm and dry. No rash noted.   Psychiatric: She has a normal mood and affect. Judgment and thought content normal.   Vitals reviewed.      LABS:  WBC   Date Value Ref Range Status   06/03/2019 11.79 3.90 - 12.70 K/uL Final     Hemoglobin   Date Value Ref Range Status   06/03/2019 12.4 12.0 - 16.0 g/dL Final     Hematocrit   Date Value Ref Range Status   06/03/2019 38.5 37.0 - 48.5 % Final     Platelets   Date Value Ref Range Status   06/03/2019 153 150 - 350 K/uL Final     Gran # (ANC)   Date Value Ref Range Status   06/03/2019 7.5 1.8 - 7.7 K/uL Final     Comment:     The ANC is based on a white cell differential from an   automated cell counter. It has not been microscopically   reviewed for the presence of abnormal cells. Clinical   correlation is required.         Chemistry        Component Value Date/Time     06/03/2019 0714    K 4.0 06/03/2019 0714     06/03/2019 0714    CO2 26 06/03/2019 0714    BUN 16 06/03/2019 0714    CREATININE 0.8 06/03/2019 0714     06/03/2019 0714        Component Value Date/Time    CALCIUM 9.6 06/03/2019 0714    ALKPHOS 101 06/03/2019 0714    AST 23 06/03/2019 0714    ALT 18 06/03/2019 0714    BILITOT 0.3 06/03/2019 0714    ESTGFRAFRICA >60.0 06/03/2019 0714    EGFRNONAA >60.0 06/03/2019 0714           Assessment:       1. Malignant neoplasm of sigmoid colon    2. Secondary adenocarcinoma of lymph node    3. Obstruction of blood vessel        Plan:        1,2. She is doing well clinically and will proceed with FOLFOX and will return in 2 weeks for next cycle  3. Obtain ultrasound of upper extremities.    Above care plan was discussed with patient and accompanying  and all questions were addressed to their satisfaction

## 2019-06-04 ENCOUNTER — HOSPITAL ENCOUNTER (OUTPATIENT)
Dept: RADIOLOGY | Facility: HOSPITAL | Age: 54
Discharge: HOME | End: 2019-06-04
Attending: INTERNAL MEDICINE
Payer: COMMERCIAL

## 2019-06-04 ENCOUNTER — PATIENT MESSAGE (OUTPATIENT)
Dept: HEMATOLOGY/ONCOLOGY | Facility: CLINIC | Age: 54
End: 2019-06-04

## 2019-06-04 DIAGNOSIS — I99.8: ICD-10-CM

## 2019-06-04 PROCEDURE — 93970 EXTREMITY STUDY: CPT | Mod: TC

## 2019-06-05 ENCOUNTER — INFUSION (OUTPATIENT)
Dept: INFUSION THERAPY | Facility: HOSPITAL | Age: 54
End: 2019-06-05
Attending: INTERNAL MEDICINE
Payer: COMMERCIAL

## 2019-06-05 VITALS
TEMPERATURE: 98 F | HEART RATE: 64 BPM | DIASTOLIC BLOOD PRESSURE: 80 MMHG | RESPIRATION RATE: 18 BRPM | SYSTOLIC BLOOD PRESSURE: 139 MMHG

## 2019-06-05 DIAGNOSIS — C18.7 MALIGNANT NEOPLASM OF SIGMOID COLON: Primary | ICD-10-CM

## 2019-06-05 PROCEDURE — A4216 STERILE WATER/SALINE, 10 ML: HCPCS | Performed by: INTERNAL MEDICINE

## 2019-06-05 PROCEDURE — 63600175 PHARM REV CODE 636 W HCPCS: Performed by: INTERNAL MEDICINE

## 2019-06-05 PROCEDURE — 25000003 PHARM REV CODE 250: Performed by: INTERNAL MEDICINE

## 2019-06-05 PROCEDURE — 96377 APPLICATON ON-BODY INJECTOR: CPT

## 2019-06-05 RX ORDER — HEPARIN 100 UNIT/ML
500 SYRINGE INTRAVENOUS
Status: DISCONTINUED | OUTPATIENT
Start: 2019-06-05 | End: 2019-06-05 | Stop reason: HOSPADM

## 2019-06-05 RX ORDER — SODIUM CHLORIDE 0.9 % (FLUSH) 0.9 %
10 SYRINGE (ML) INJECTION
Status: DISCONTINUED | OUTPATIENT
Start: 2019-06-05 | End: 2019-06-05 | Stop reason: HOSPADM

## 2019-06-05 RX ADMIN — HEPARIN 500 UNITS: 100 SYRINGE at 11:06

## 2019-06-05 RX ADMIN — Medication 10 ML: at 11:06

## 2019-06-05 RX ADMIN — PEGFILGRASTIM 6 MG: KIT SUBCUTANEOUS at 11:06

## 2019-06-05 NOTE — NURSING
pt arrived. VSS.  pump completed. Removed pump. Port deaccessed after NS & heparin flush. DC home ambulating independently. OBI with slow blinking green light prior to dc.  Verbalized understanding of RTC date & s/s to report to MD.

## 2019-06-18 ENCOUNTER — INFUSION (OUTPATIENT)
Dept: INFUSION THERAPY | Facility: HOSPITAL | Age: 54
End: 2019-06-18
Attending: INTERNAL MEDICINE
Payer: COMMERCIAL

## 2019-06-18 ENCOUNTER — OFFICE VISIT (OUTPATIENT)
Dept: HEMATOLOGY/ONCOLOGY | Facility: CLINIC | Age: 54
End: 2019-06-18
Payer: COMMERCIAL

## 2019-06-18 ENCOUNTER — LAB VISIT (OUTPATIENT)
Dept: LAB | Facility: HOSPITAL | Age: 54
End: 2019-06-18
Attending: INTERNAL MEDICINE
Payer: COMMERCIAL

## 2019-06-18 VITALS
TEMPERATURE: 98 F | WEIGHT: 179.88 LBS | OXYGEN SATURATION: 98 % | BODY MASS INDEX: 31.87 KG/M2 | SYSTOLIC BLOOD PRESSURE: 152 MMHG | RESPIRATION RATE: 16 BRPM | HEART RATE: 72 BPM | HEIGHT: 63 IN | DIASTOLIC BLOOD PRESSURE: 84 MMHG

## 2019-06-18 VITALS
RESPIRATION RATE: 18 BRPM | DIASTOLIC BLOOD PRESSURE: 83 MMHG | HEART RATE: 70 BPM | WEIGHT: 179.88 LBS | HEIGHT: 62 IN | SYSTOLIC BLOOD PRESSURE: 165 MMHG | TEMPERATURE: 98 F | BODY MASS INDEX: 33.1 KG/M2

## 2019-06-18 DIAGNOSIS — C18.7 MALIGNANT NEOPLASM OF SIGMOID COLON: ICD-10-CM

## 2019-06-18 DIAGNOSIS — C18.7 MALIGNANT NEOPLASM OF SIGMOID COLON: Primary | ICD-10-CM

## 2019-06-18 DIAGNOSIS — C77.9 SECONDARY ADENOCARCINOMA OF LYMPH NODE: ICD-10-CM

## 2019-06-18 LAB
ALBUMIN SERPL BCP-MCNC: 3.3 G/DL (ref 3.5–5.2)
ALP SERPL-CCNC: 114 U/L (ref 55–135)
ALT SERPL W/O P-5'-P-CCNC: 31 U/L (ref 10–44)
ANION GAP SERPL CALC-SCNC: 8 MMOL/L (ref 8–16)
AST SERPL-CCNC: 27 U/L (ref 10–40)
BILIRUB SERPL-MCNC: 0.5 MG/DL (ref 0.1–1)
BUN SERPL-MCNC: 12 MG/DL (ref 6–20)
CALCIUM SERPL-MCNC: 9.4 MG/DL (ref 8.7–10.5)
CHLORIDE SERPL-SCNC: 106 MMOL/L (ref 95–110)
CO2 SERPL-SCNC: 26 MMOL/L (ref 23–29)
CREAT SERPL-MCNC: 0.8 MG/DL (ref 0.5–1.4)
ERYTHROCYTE [DISTWIDTH] IN BLOOD BY AUTOMATED COUNT: 17.5 % (ref 11.5–14.5)
EST. GFR  (AFRICAN AMERICAN): >60 ML/MIN/1.73 M^2
EST. GFR  (NON AFRICAN AMERICAN): >60 ML/MIN/1.73 M^2
GLUCOSE SERPL-MCNC: 98 MG/DL (ref 70–110)
HCT VFR BLD AUTO: 38.9 % (ref 37–48.5)
HGB BLD-MCNC: 12.7 G/DL (ref 12–16)
IMM GRANULOCYTES # BLD AUTO: 0.22 K/UL (ref 0–0.04)
MCH RBC QN AUTO: 30.1 PG (ref 27–31)
MCHC RBC AUTO-ENTMCNC: 32.6 G/DL (ref 32–36)
MCV RBC AUTO: 92 FL (ref 82–98)
NEUTROPHILS # BLD AUTO: 6.1 K/UL (ref 1.8–7.7)
PLATELET # BLD AUTO: 152 K/UL (ref 150–350)
PMV BLD AUTO: 11.3 FL (ref 9.2–12.9)
POTASSIUM SERPL-SCNC: 3.5 MMOL/L (ref 3.5–5.1)
PROT SERPL-MCNC: 6.8 G/DL (ref 6–8.4)
RBC # BLD AUTO: 4.22 M/UL (ref 4–5.4)
SODIUM SERPL-SCNC: 140 MMOL/L (ref 136–145)
WBC # BLD AUTO: 9.63 K/UL (ref 3.9–12.7)

## 2019-06-18 PROCEDURE — 96415 CHEMO IV INFUSION ADDL HR: CPT

## 2019-06-18 PROCEDURE — 96413 CHEMO IV INFUSION 1 HR: CPT

## 2019-06-18 PROCEDURE — 85027 COMPLETE CBC AUTOMATED: CPT

## 2019-06-18 PROCEDURE — 99999 PR PBB SHADOW E&M-EST. PATIENT-LVL III: ICD-10-PCS | Mod: PBBFAC,,, | Performed by: INTERNAL MEDICINE

## 2019-06-18 PROCEDURE — 99215 OFFICE O/P EST HI 40 MIN: CPT | Mod: S$GLB,,, | Performed by: INTERNAL MEDICINE

## 2019-06-18 PROCEDURE — 63600175 PHARM REV CODE 636 W HCPCS: Performed by: INTERNAL MEDICINE

## 2019-06-18 PROCEDURE — 36415 COLL VENOUS BLD VENIPUNCTURE: CPT

## 2019-06-18 PROCEDURE — 96416 CHEMO PROLONG INFUSE W/PUMP: CPT

## 2019-06-18 PROCEDURE — 99999 PR PBB SHADOW E&M-EST. PATIENT-LVL III: CPT | Mod: PBBFAC,,, | Performed by: INTERNAL MEDICINE

## 2019-06-18 PROCEDURE — 25000003 PHARM REV CODE 250: Performed by: INTERNAL MEDICINE

## 2019-06-18 PROCEDURE — 96368 THER/DIAG CONCURRENT INF: CPT

## 2019-06-18 PROCEDURE — 99215 PR OFFICE/OUTPT VISIT, EST, LEVL V, 40-54 MIN: ICD-10-PCS | Mod: S$GLB,,, | Performed by: INTERNAL MEDICINE

## 2019-06-18 PROCEDURE — 96411 CHEMO IV PUSH ADDL DRUG: CPT

## 2019-06-18 PROCEDURE — 96367 TX/PROPH/DG ADDL SEQ IV INF: CPT

## 2019-06-18 PROCEDURE — 80053 COMPREHEN METABOLIC PANEL: CPT

## 2019-06-18 PROCEDURE — 3008F PR BODY MASS INDEX (BMI) DOCUMENTED: ICD-10-PCS | Mod: CPTII,S$GLB,, | Performed by: INTERNAL MEDICINE

## 2019-06-18 PROCEDURE — 3008F BODY MASS INDEX DOCD: CPT | Mod: CPTII,S$GLB,, | Performed by: INTERNAL MEDICINE

## 2019-06-18 RX ORDER — DIPHENHYDRAMINE HYDROCHLORIDE 50 MG/ML
50 INJECTION INTRAMUSCULAR; INTRAVENOUS ONCE AS NEEDED
Status: CANCELLED | OUTPATIENT
Start: 2019-06-18

## 2019-06-18 RX ORDER — HEPARIN 100 UNIT/ML
500 SYRINGE INTRAVENOUS
Status: DISCONTINUED | OUTPATIENT
Start: 2019-06-18 | End: 2019-06-18 | Stop reason: HOSPADM

## 2019-06-18 RX ORDER — SODIUM CHLORIDE 0.9 % (FLUSH) 0.9 %
10 SYRINGE (ML) INJECTION
Status: DISCONTINUED | OUTPATIENT
Start: 2019-06-18 | End: 2019-06-18 | Stop reason: HOSPADM

## 2019-06-18 RX ORDER — SODIUM CHLORIDE 0.9 % (FLUSH) 0.9 %
10 SYRINGE (ML) INJECTION
Status: CANCELLED | OUTPATIENT
Start: 2019-06-18

## 2019-06-18 RX ORDER — DIPHENHYDRAMINE HYDROCHLORIDE 50 MG/ML
50 INJECTION INTRAMUSCULAR; INTRAVENOUS ONCE AS NEEDED
Status: DISCONTINUED | OUTPATIENT
Start: 2019-06-18 | End: 2019-06-18 | Stop reason: HOSPADM

## 2019-06-18 RX ORDER — EPINEPHRINE 0.3 MG/.3ML
0.3 INJECTION SUBCUTANEOUS ONCE AS NEEDED
Status: DISCONTINUED | OUTPATIENT
Start: 2019-06-18 | End: 2019-06-18 | Stop reason: HOSPADM

## 2019-06-18 RX ORDER — FLUOROURACIL 50 MG/ML
400 INJECTION, SOLUTION INTRAVENOUS
Status: COMPLETED | OUTPATIENT
Start: 2019-06-18 | End: 2019-06-18

## 2019-06-18 RX ORDER — HEPARIN 100 UNIT/ML
500 SYRINGE INTRAVENOUS
Status: CANCELLED | OUTPATIENT
Start: 2019-06-18

## 2019-06-18 RX ORDER — EPINEPHRINE 0.3 MG/.3ML
0.3 INJECTION SUBCUTANEOUS ONCE AS NEEDED
Status: CANCELLED | OUTPATIENT
Start: 2019-06-18

## 2019-06-18 RX ORDER — FLUOROURACIL 50 MG/ML
400 INJECTION, SOLUTION INTRAVENOUS
Status: CANCELLED | OUTPATIENT
Start: 2019-06-18

## 2019-06-18 RX ORDER — HEPARIN 100 UNIT/ML
500 SYRINGE INTRAVENOUS
Status: CANCELLED | OUTPATIENT
Start: 2019-06-20

## 2019-06-18 RX ORDER — SODIUM CHLORIDE 0.9 % (FLUSH) 0.9 %
10 SYRINGE (ML) INJECTION
Status: CANCELLED | OUTPATIENT
Start: 2019-06-20

## 2019-06-18 RX ADMIN — LEUCOVORIN CALCIUM 755 MG: 50 INJECTION, POWDER, LYOPHILIZED, FOR SOLUTION INTRAMUSCULAR; INTRAVENOUS at 09:06

## 2019-06-18 RX ADMIN — OXALIPLATIN 161 MG: 5 INJECTION, SOLUTION, CONCENTRATE INTRAVENOUS at 09:06

## 2019-06-18 RX ADMIN — DEXAMETHASONE SODIUM PHOSPHATE: 4 INJECTION, SOLUTION INTRAMUSCULAR; INTRAVENOUS at 08:06

## 2019-06-18 RX ADMIN — DEXTROSE: 50 INJECTION, SOLUTION INTRAVENOUS at 08:06

## 2019-06-18 RX ADMIN — FLUOROURACIL 755 MG: 50 INJECTION, SOLUTION INTRAVENOUS at 11:06

## 2019-06-18 RX ADMIN — FLUOROURACIL 4535 MG: 50 INJECTION, SOLUTION INTRAVENOUS at 11:06

## 2019-06-18 RX ADMIN — SODIUM CHLORIDE: 0.9 INJECTION, SOLUTION INTRAVENOUS at 08:06

## 2019-06-18 NOTE — PROGRESS NOTES
Subjective:       Patient ID: Irene Galo is a 53 y.o. female.    Chief Complaint: Colon Cancer; no mouth sores; occas n/v/d; runny clear nose; Gastroesophageal Reflux; and upper back pain 2/10  ONCOLOGIC HISTORY: Ms. Irene Galo is a 53-year-old female who underwent screening colonoscopy and was noted to have a circumferential partially obstructing mass in the rectosigmoid, which could not be traversed with the scope.  Inferior edge was 18 to 19 cm from the anal verge.  This was done on 01/10/2019.  Pathology from that revealed moderate dysplasia.  Malignancy could not be excluded.  She underwent a CT scan on 01/15/2019 that revealed a focal area of thickening in the rectosigmoid colon measuring 1.9 cm in length, which could possibly correspond to the abnormality seen on recent colonoscopy.  There is some  slight prominent lymph node superior to this region of thickening along the course of the inferior mesenteric vessels measuring approximately 6.5 mm.  CEA was 3.3.  She underwent sigmoid colectomy on 02/15/2019.  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               HPI She comes in to receive cycle 6 of adjuvant chemo with FOLFOX. He notes fatigue lasted a little longer.  She noted nausea after chemo which resolved with Zofran. She only had 12 episode of diarrhea and took imodium    She denies any constipation, abdominal pain, weight loss or loss of appetite, chest pain, shortness of breath, leg swelling, fatigue, pain, headache, dizziness, or mood changes. Her ECOG PS is zero. She is by herself.          Review of Systems   Constitutional: Negative for appetite change and unexpected weight change.   Eyes: Negative for visual disturbance.   Respiratory: Negative for cough and shortness of breath.    Cardiovascular: Negative for chest pain.   Gastrointestinal: Positive for diarrhea. Negative for abdominal pain.   Genitourinary: Negative for frequency.   Musculoskeletal: Positive for back  pain.   Skin: Negative for rash.   Neurological: Negative for headaches.   Hematological: Negative for adenopathy.   Psychiatric/Behavioral: The patient is not nervous/anxious.        Objective:      Physical Exam   Constitutional: She is oriented to person, place, and time. She appears well-developed and well-nourished.   HENT:   Mouth/Throat: No oropharyngeal exudate.   Cardiovascular: Normal rate and normal heart sounds.   Pulmonary/Chest: Effort normal and breath sounds normal. She has no wheezes.   Abdominal: Soft. Bowel sounds are normal. There is no tenderness.   Musculoskeletal: She exhibits no edema or tenderness.   Lymphadenopathy:     She has no cervical adenopathy.   Neurological: She is alert and oriented to person, place, and time. Coordination normal.   Skin: Skin is warm and dry. No rash noted.   Psychiatric: She has a normal mood and affect. Judgment and thought content normal.   Vitals reviewed.      LABS:  WBC   Date Value Ref Range Status   06/18/2019 9.63 3.90 - 12.70 K/uL Final     Hemoglobin   Date Value Ref Range Status   06/18/2019 12.7 12.0 - 16.0 g/dL Final     Hematocrit   Date Value Ref Range Status   06/18/2019 38.9 37.0 - 48.5 % Final     Platelets   Date Value Ref Range Status   06/18/2019 152 150 - 350 K/uL Final     Gran # (ANC)   Date Value Ref Range Status   06/18/2019 6.1 1.8 - 7.7 K/uL Final     Comment:     The ANC is based on a white cell differential from an   automated cell counter. It has not been microscopically   reviewed for the presence of abnormal cells. Clinical   correlation is required.         Chemistry        Component Value Date/Time     06/18/2019 0707    K 3.5 06/18/2019 0707     06/18/2019 0707    CO2 26 06/18/2019 0707    BUN 12 06/18/2019 0707    CREATININE 0.8 06/18/2019 0707    GLU 98 06/18/2019 0707        Component Value Date/Time    CALCIUM 9.4 06/18/2019 0707    ALKPHOS 114 06/18/2019 0707    AST 27 06/18/2019 0707    ALT 31 06/18/2019 0707     BILITOT 0.5 06/18/2019 0707    ESTGFRAFRICA >60.0 06/18/2019 0707    EGFRNONAA >60.0 06/18/2019 0707          Assessment:       1. Malignant neoplasm of sigmoid colon    2. Secondary adenocarcinoma of lymph node        Plan:        1,2. She will proceed with FOLFOX and will return in 2 weeks for next cycle    Above care plan was discussed with patient and accompanying  and all questions were addressed to their satisfaction

## 2019-06-18 NOTE — PLAN OF CARE
Problem: Adult Inpatient Plan of Care  Goal: Plan of Care Review  Outcome: Ongoing (interventions implemented as appropriate)  Did well with treatment today. To return Thursday for pump d'c around 0930.

## 2019-06-20 ENCOUNTER — INFUSION (OUTPATIENT)
Dept: INFUSION THERAPY | Facility: HOSPITAL | Age: 54
End: 2019-06-20
Attending: INTERNAL MEDICINE
Payer: COMMERCIAL

## 2019-06-20 VITALS
HEIGHT: 62 IN | RESPIRATION RATE: 18 BRPM | TEMPERATURE: 99 F | SYSTOLIC BLOOD PRESSURE: 130 MMHG | BODY MASS INDEX: 32.94 KG/M2 | WEIGHT: 179 LBS | DIASTOLIC BLOOD PRESSURE: 82 MMHG | HEART RATE: 98 BPM

## 2019-06-20 DIAGNOSIS — C18.7 MALIGNANT NEOPLASM OF SIGMOID COLON: Primary | ICD-10-CM

## 2019-06-20 PROCEDURE — A4216 STERILE WATER/SALINE, 10 ML: HCPCS | Performed by: INTERNAL MEDICINE

## 2019-06-20 PROCEDURE — 25000003 PHARM REV CODE 250: Performed by: INTERNAL MEDICINE

## 2019-06-20 PROCEDURE — 96377 APPLICATON ON-BODY INJECTOR: CPT

## 2019-06-20 PROCEDURE — 63600175 PHARM REV CODE 636 W HCPCS: Performed by: INTERNAL MEDICINE

## 2019-06-20 RX ORDER — HEPARIN 100 UNIT/ML
500 SYRINGE INTRAVENOUS
Status: DISCONTINUED | OUTPATIENT
Start: 2019-06-20 | End: 2019-06-20 | Stop reason: HOSPADM

## 2019-06-20 RX ORDER — SODIUM CHLORIDE 0.9 % (FLUSH) 0.9 %
10 SYRINGE (ML) INJECTION
Status: DISCONTINUED | OUTPATIENT
Start: 2019-06-20 | End: 2019-06-20 | Stop reason: HOSPADM

## 2019-06-20 RX ADMIN — Medication 10 ML: at 09:06

## 2019-06-20 RX ADMIN — HEPARIN 500 UNITS: 100 SYRINGE at 09:06

## 2019-06-20 RX ADMIN — PEGFILGRASTIM 6 MG: KIT SUBCUTANEOUS at 09:06

## 2019-06-20 NOTE — PLAN OF CARE
Problem: Adult Inpatient Plan of Care  Goal: Plan of Care Review  Outcome: Ongoing (interventions implemented as appropriate)  Pt here for pump d/c, infusion complete, bag empty, port flushed per policy and deaccessed without issue, OBI placed to right arm activated and blinking green prior to d/c to home, pt to rtc 7/1/19, no distress noted upon d/c to home

## 2019-07-01 ENCOUNTER — INFUSION (OUTPATIENT)
Dept: INFUSION THERAPY | Facility: HOSPITAL | Age: 54
End: 2019-07-01
Attending: INTERNAL MEDICINE
Payer: COMMERCIAL

## 2019-07-01 ENCOUNTER — OFFICE VISIT (OUTPATIENT)
Dept: HEMATOLOGY/ONCOLOGY | Facility: CLINIC | Age: 54
End: 2019-07-01
Payer: COMMERCIAL

## 2019-07-01 ENCOUNTER — LAB VISIT (OUTPATIENT)
Dept: LAB | Facility: HOSPITAL | Age: 54
End: 2019-07-01
Attending: INTERNAL MEDICINE
Payer: COMMERCIAL

## 2019-07-01 VITALS
BODY MASS INDEX: 32.15 KG/M2 | SYSTOLIC BLOOD PRESSURE: 152 MMHG | DIASTOLIC BLOOD PRESSURE: 79 MMHG | WEIGHT: 181.44 LBS | OXYGEN SATURATION: 98 % | HEIGHT: 63 IN | TEMPERATURE: 98 F | RESPIRATION RATE: 15 BRPM | HEART RATE: 75 BPM

## 2019-07-01 VITALS
SYSTOLIC BLOOD PRESSURE: 164 MMHG | HEART RATE: 70 BPM | RESPIRATION RATE: 18 BRPM | TEMPERATURE: 98 F | DIASTOLIC BLOOD PRESSURE: 95 MMHG

## 2019-07-01 DIAGNOSIS — C18.7 MALIGNANT NEOPLASM OF SIGMOID COLON: Primary | ICD-10-CM

## 2019-07-01 DIAGNOSIS — D70.1 CHEMOTHERAPY INDUCED NEUTROPENIA: ICD-10-CM

## 2019-07-01 DIAGNOSIS — C18.7 MALIGNANT NEOPLASM OF SIGMOID COLON: ICD-10-CM

## 2019-07-01 DIAGNOSIS — T45.1X5A CHEMOTHERAPY INDUCED NEUTROPENIA: ICD-10-CM

## 2019-07-01 DIAGNOSIS — C77.9 SECONDARY ADENOCARCINOMA OF LYMPH NODE: ICD-10-CM

## 2019-07-01 LAB
ALBUMIN SERPL BCP-MCNC: 3.1 G/DL (ref 3.5–5.2)
ALP SERPL-CCNC: 111 U/L (ref 55–135)
ALT SERPL W/O P-5'-P-CCNC: 22 U/L (ref 10–44)
ANION GAP SERPL CALC-SCNC: 9 MMOL/L (ref 8–16)
AST SERPL-CCNC: 29 U/L (ref 10–40)
BILIRUB SERPL-MCNC: 0.4 MG/DL (ref 0.1–1)
BUN SERPL-MCNC: 9 MG/DL (ref 6–20)
CALCIUM SERPL-MCNC: 9.6 MG/DL (ref 8.7–10.5)
CHLORIDE SERPL-SCNC: 106 MMOL/L (ref 95–110)
CO2 SERPL-SCNC: 26 MMOL/L (ref 23–29)
CREAT SERPL-MCNC: 0.8 MG/DL (ref 0.5–1.4)
ERYTHROCYTE [DISTWIDTH] IN BLOOD BY AUTOMATED COUNT: 17.5 % (ref 11.5–14.5)
EST. GFR  (AFRICAN AMERICAN): >60 ML/MIN/1.73 M^2
EST. GFR  (NON AFRICAN AMERICAN): >60 ML/MIN/1.73 M^2
GLUCOSE SERPL-MCNC: 99 MG/DL (ref 70–110)
HCT VFR BLD AUTO: 37.2 % (ref 37–48.5)
HGB BLD-MCNC: 11.7 G/DL (ref 12–16)
IMM GRANULOCYTES # BLD AUTO: 0.24 K/UL (ref 0–0.04)
MCH RBC QN AUTO: 30.3 PG (ref 27–31)
MCHC RBC AUTO-ENTMCNC: 31.5 G/DL (ref 32–36)
MCV RBC AUTO: 96 FL (ref 82–98)
NEUTROPHILS # BLD AUTO: 7.4 K/UL (ref 1.8–7.7)
PLATELET # BLD AUTO: 135 K/UL (ref 150–350)
PMV BLD AUTO: 11.5 FL (ref 9.2–12.9)
POTASSIUM SERPL-SCNC: 4 MMOL/L (ref 3.5–5.1)
PROT SERPL-MCNC: 6.7 G/DL (ref 6–8.4)
RBC # BLD AUTO: 3.86 M/UL (ref 4–5.4)
SODIUM SERPL-SCNC: 141 MMOL/L (ref 136–145)
WBC # BLD AUTO: 11.37 K/UL (ref 3.9–12.7)

## 2019-07-01 PROCEDURE — 63600175 PHARM REV CODE 636 W HCPCS: Performed by: INTERNAL MEDICINE

## 2019-07-01 PROCEDURE — 99215 PR OFFICE/OUTPT VISIT, EST, LEVL V, 40-54 MIN: ICD-10-PCS | Mod: S$GLB,,, | Performed by: INTERNAL MEDICINE

## 2019-07-01 PROCEDURE — 3008F BODY MASS INDEX DOCD: CPT | Mod: CPTII,S$GLB,, | Performed by: INTERNAL MEDICINE

## 2019-07-01 PROCEDURE — 80053 COMPREHEN METABOLIC PANEL: CPT

## 2019-07-01 PROCEDURE — 96368 THER/DIAG CONCURRENT INF: CPT

## 2019-07-01 PROCEDURE — 25000003 PHARM REV CODE 250: Performed by: INTERNAL MEDICINE

## 2019-07-01 PROCEDURE — 99215 OFFICE O/P EST HI 40 MIN: CPT | Mod: S$GLB,,, | Performed by: INTERNAL MEDICINE

## 2019-07-01 PROCEDURE — 36415 COLL VENOUS BLD VENIPUNCTURE: CPT

## 2019-07-01 PROCEDURE — 96413 CHEMO IV INFUSION 1 HR: CPT

## 2019-07-01 PROCEDURE — 96411 CHEMO IV PUSH ADDL DRUG: CPT

## 2019-07-01 PROCEDURE — 99999 PR PBB SHADOW E&M-EST. PATIENT-LVL IV: CPT | Mod: PBBFAC,,, | Performed by: INTERNAL MEDICINE

## 2019-07-01 PROCEDURE — 96416 CHEMO PROLONG INFUSE W/PUMP: CPT

## 2019-07-01 PROCEDURE — 85027 COMPLETE CBC AUTOMATED: CPT

## 2019-07-01 PROCEDURE — 3008F PR BODY MASS INDEX (BMI) DOCUMENTED: ICD-10-PCS | Mod: CPTII,S$GLB,, | Performed by: INTERNAL MEDICINE

## 2019-07-01 PROCEDURE — 96415 CHEMO IV INFUSION ADDL HR: CPT

## 2019-07-01 PROCEDURE — 96367 TX/PROPH/DG ADDL SEQ IV INF: CPT

## 2019-07-01 PROCEDURE — 99999 PR PBB SHADOW E&M-EST. PATIENT-LVL IV: ICD-10-PCS | Mod: PBBFAC,,, | Performed by: INTERNAL MEDICINE

## 2019-07-01 RX ORDER — DIPHENHYDRAMINE HYDROCHLORIDE 50 MG/ML
50 INJECTION INTRAMUSCULAR; INTRAVENOUS ONCE AS NEEDED
Status: CANCELLED | OUTPATIENT
Start: 2019-07-01

## 2019-07-01 RX ORDER — DIPHENHYDRAMINE HYDROCHLORIDE 50 MG/ML
50 INJECTION INTRAMUSCULAR; INTRAVENOUS ONCE AS NEEDED
Status: DISCONTINUED | OUTPATIENT
Start: 2019-07-01 | End: 2019-07-01 | Stop reason: HOSPADM

## 2019-07-01 RX ORDER — SODIUM CHLORIDE 0.9 % (FLUSH) 0.9 %
10 SYRINGE (ML) INJECTION
Status: CANCELLED | OUTPATIENT
Start: 2019-07-03

## 2019-07-01 RX ORDER — FLUOROURACIL 50 MG/ML
400 INJECTION, SOLUTION INTRAVENOUS
Status: COMPLETED | OUTPATIENT
Start: 2019-07-01 | End: 2019-07-01

## 2019-07-01 RX ORDER — SODIUM CHLORIDE 0.9 % (FLUSH) 0.9 %
10 SYRINGE (ML) INJECTION
Status: CANCELLED | OUTPATIENT
Start: 2019-07-01

## 2019-07-01 RX ORDER — FLUOROURACIL 50 MG/ML
400 INJECTION, SOLUTION INTRAVENOUS
Status: CANCELLED | OUTPATIENT
Start: 2019-07-01

## 2019-07-01 RX ORDER — HEPARIN 100 UNIT/ML
500 SYRINGE INTRAVENOUS
Status: CANCELLED | OUTPATIENT
Start: 2019-07-01

## 2019-07-01 RX ORDER — EPINEPHRINE 0.3 MG/.3ML
0.3 INJECTION SUBCUTANEOUS ONCE AS NEEDED
Status: CANCELLED | OUTPATIENT
Start: 2019-07-01

## 2019-07-01 RX ORDER — HEPARIN 100 UNIT/ML
500 SYRINGE INTRAVENOUS
Status: CANCELLED | OUTPATIENT
Start: 2019-07-03

## 2019-07-01 RX ORDER — EPINEPHRINE 0.3 MG/.3ML
0.3 INJECTION SUBCUTANEOUS ONCE AS NEEDED
Status: DISCONTINUED | OUTPATIENT
Start: 2019-07-01 | End: 2019-07-01 | Stop reason: HOSPADM

## 2019-07-01 RX ADMIN — LEUCOVORIN CALCIUM 755 MG: 350 INJECTION, POWDER, LYOPHILIZED, FOR SOLUTION INTRAMUSCULAR; INTRAVENOUS at 09:07

## 2019-07-01 RX ADMIN — FLUOROURACIL 4535 MG: 50 INJECTION, SOLUTION INTRAVENOUS at 12:07

## 2019-07-01 RX ADMIN — OXALIPLATIN 161 MG: 100 INJECTION, SOLUTION, CONCENTRATE INTRAVENOUS at 09:07

## 2019-07-01 RX ADMIN — DEXTROSE: 50 INJECTION, SOLUTION INTRAVENOUS at 09:07

## 2019-07-01 RX ADMIN — PALONOSETRON HYDROCHLORIDE: 0.25 INJECTION, SOLUTION INTRAVENOUS at 09:07

## 2019-07-01 RX ADMIN — FLUOROURACIL 755 MG: 50 INJECTION, SOLUTION INTRAVENOUS at 12:07

## 2019-07-01 NOTE — PLAN OF CARE
Problem: Adult Inpatient Plan of Care  Goal: Patient-Specific Goal (Individualization)  1210- Portable pump infusing on discharge all clamps open and connections secured. No kinks in tubing noted. Settings double checked by EZRA Ivy RN. Educated patient to be sure pump should say run and volume infused number should increase daily as reservoir volume decreases. Instructed to call with any issues with pump. Phone numbers given. AVS given.  Instructed to call provider for any questions or concerns.

## 2019-07-01 NOTE — PLAN OF CARE
Problem: Adult Inpatient Plan of Care  Goal: Patient-Specific Goal (Individualization)  Outcome: Ongoing (interventions implemented as appropriate)  0840-Labs , hx, and medications reviewed, patient was seen by Md prior to arrival. Assessment completed. Discussed plan of care with patient. Patient in agreement. Chair reclined and warm blanket and snack offered.

## 2019-07-01 NOTE — PROGRESS NOTES
Subjective:       Patient ID: Irene Galo is a 53 y.o. female.    Chief Complaint: Malignant neoplasm of sigmoid colon; cold sensitivity; and no n/v/d/mouth sores/neuropathy  ONCOLOGIC HISTORY: Ms. Irene Galo is a 53-year-old female who underwent screening colonoscopy and was noted to have a circumferential partially obstructing mass in the rectosigmoid, which could not be traversed with the scope.  Inferior edge was 18 to 19 cm from the anal verge.  This was done on 01/10/2019.  Pathology from that revealed moderate dysplasia.  Malignancy could not be excluded.  She underwent a CT scan on 01/15/2019 that revealed a focal area of thickening in the rectosigmoid colon measuring 1.9 cm in length, which could possibly correspond to the abnormality seen on recent colonoscopy.  There is some  slight prominent lymph node superior to this region of thickening along the course of the inferior mesenteric vessels measuring approximately 6.5 mm.  CEA was 3.3.  She underwent sigmoid colectomy on 02/15/2019.  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                HPI She comes in to receive cycle 7 of adjuvant chemo with FOLFOX  Notes increased cold sensitivity.   She denies any nausea, vomiting, diarrhea, constipation, abdominal pain, weight loss or loss of appetite, chest pain, shortness of breath, leg swelling, fatigue, pain, headache, dizziness, or mood changes. Her ECOG PS is zero. She is by herself         Review of Systems   Constitutional: Negative for appetite change and unexpected weight change.   Eyes: Negative for visual disturbance.   Respiratory: Negative for cough and shortness of breath.    Cardiovascular: Positive for chest pain.   Gastrointestinal: Negative for abdominal pain and diarrhea.   Genitourinary: Negative for frequency.   Musculoskeletal: Positive for back pain.   Skin: Negative for rash.   Neurological: Negative for headaches.   Hematological: Negative for adenopathy.    Psychiatric/Behavioral: The patient is not nervous/anxious.        Objective:      Physical Exam   Constitutional: She is oriented to person, place, and time. She appears well-developed and well-nourished.   HENT:   Mouth/Throat: No oropharyngeal exudate.   Cardiovascular: Normal rate and normal heart sounds.   Pulmonary/Chest: Effort normal and breath sounds normal. She has no wheezes.   Abdominal: Soft. Bowel sounds are normal. There is no tenderness.   Musculoskeletal: She exhibits no edema or tenderness.   Lymphadenopathy:     She has no cervical adenopathy.   Neurological: She is alert and oriented to person, place, and time. Coordination normal.   Skin: Skin is warm and dry. No rash noted.   Psychiatric: She has a normal mood and affect. Judgment and thought content normal.   Vitals reviewed.        LABS:  WBC   Date Value Ref Range Status   07/01/2019 11.37 3.90 - 12.70 K/uL Final     Hemoglobin   Date Value Ref Range Status   07/01/2019 11.7 (L) 12.0 - 16.0 g/dL Final     Hematocrit   Date Value Ref Range Status   07/01/2019 37.2 37.0 - 48.5 % Final     Platelets   Date Value Ref Range Status   07/01/2019 135 (L) 150 - 350 K/uL Final     Gran # (ANC)   Date Value Ref Range Status   07/01/2019 7.4 1.8 - 7.7 K/uL Final     Comment:     The ANC is based on a white cell differential from an   automated cell counter. It has not been microscopically   reviewed for the presence of abnormal cells. Clinical   correlation is required.         Chemistry        Component Value Date/Time     07/01/2019 0733    K 4.0 07/01/2019 0733     07/01/2019 0733    CO2 26 07/01/2019 0733    BUN 9 07/01/2019 0733    CREATININE 0.8 07/01/2019 0733    GLU 99 07/01/2019 0733        Component Value Date/Time    CALCIUM 9.6 07/01/2019 0733    ALKPHOS 111 07/01/2019 0733    AST 29 07/01/2019 0733    ALT 22 07/01/2019 0733    BILITOT 0.4 07/01/2019 0733    ESTGFRAFRICA >60.0 07/01/2019 0733    EGFRNONAA >60.0 07/01/2019 0733           Assessment:       1. Malignant neoplasm of sigmoid colon    2. Secondary adenocarcinoma of lymph node    3. Chemotherapy induced neutropenia        Plan:        1,2,3. She is doing well clinically and will proceed with cycle 7 of adjuvant chemo with FOLFOZ, neulasta OBI on day 3 and will return in 2 weeks for next cycle    Above care plan was discussed with patient and all questions were addressed to her satisfaction

## 2019-07-03 ENCOUNTER — INFUSION (OUTPATIENT)
Dept: INFUSION THERAPY | Facility: HOSPITAL | Age: 54
End: 2019-07-03
Attending: INTERNAL MEDICINE
Payer: COMMERCIAL

## 2019-07-03 VITALS
RESPIRATION RATE: 18 BRPM | SYSTOLIC BLOOD PRESSURE: 148 MMHG | TEMPERATURE: 99 F | HEART RATE: 86 BPM | DIASTOLIC BLOOD PRESSURE: 73 MMHG

## 2019-07-03 DIAGNOSIS — C18.7 MALIGNANT NEOPLASM OF SIGMOID COLON: Primary | ICD-10-CM

## 2019-07-03 PROCEDURE — 96377 APPLICATON ON-BODY INJECTOR: CPT

## 2019-07-03 PROCEDURE — 63600175 PHARM REV CODE 636 W HCPCS: Performed by: INTERNAL MEDICINE

## 2019-07-03 PROCEDURE — 25000003 PHARM REV CODE 250: Performed by: INTERNAL MEDICINE

## 2019-07-03 PROCEDURE — A4216 STERILE WATER/SALINE, 10 ML: HCPCS | Performed by: INTERNAL MEDICINE

## 2019-07-03 RX ORDER — SODIUM CHLORIDE 0.9 % (FLUSH) 0.9 %
10 SYRINGE (ML) INJECTION
Status: DISCONTINUED | OUTPATIENT
Start: 2019-07-03 | End: 2019-07-03 | Stop reason: HOSPADM

## 2019-07-03 RX ORDER — HEPARIN 100 UNIT/ML
500 SYRINGE INTRAVENOUS
Status: DISCONTINUED | OUTPATIENT
Start: 2019-07-03 | End: 2019-07-03 | Stop reason: HOSPADM

## 2019-07-03 RX ADMIN — PEGFILGRASTIM 6 MG: KIT SUBCUTANEOUS at 10:07

## 2019-07-03 RX ADMIN — Medication 10 ML: at 10:07

## 2019-07-03 RX ADMIN — HEPARIN 500 UNITS: 100 SYRINGE at 10:07

## 2019-07-15 ENCOUNTER — LAB VISIT (OUTPATIENT)
Dept: LAB | Facility: HOSPITAL | Age: 54
End: 2019-07-15
Attending: INTERNAL MEDICINE
Payer: COMMERCIAL

## 2019-07-15 ENCOUNTER — OFFICE VISIT (OUTPATIENT)
Dept: HEMATOLOGY/ONCOLOGY | Facility: CLINIC | Age: 54
End: 2019-07-15
Payer: COMMERCIAL

## 2019-07-15 ENCOUNTER — INFUSION (OUTPATIENT)
Dept: INFUSION THERAPY | Facility: HOSPITAL | Age: 54
End: 2019-07-15
Attending: INTERNAL MEDICINE
Payer: COMMERCIAL

## 2019-07-15 VITALS
OXYGEN SATURATION: 98 % | BODY MASS INDEX: 31.25 KG/M2 | HEART RATE: 78 BPM | DIASTOLIC BLOOD PRESSURE: 81 MMHG | TEMPERATURE: 98 F | WEIGHT: 176.38 LBS | RESPIRATION RATE: 18 BRPM | HEIGHT: 63 IN | SYSTOLIC BLOOD PRESSURE: 164 MMHG

## 2019-07-15 VITALS
RESPIRATION RATE: 18 BRPM | TEMPERATURE: 98 F | DIASTOLIC BLOOD PRESSURE: 83 MMHG | SYSTOLIC BLOOD PRESSURE: 157 MMHG | HEART RATE: 72 BPM

## 2019-07-15 DIAGNOSIS — C18.7 MALIGNANT NEOPLASM OF SIGMOID COLON: ICD-10-CM

## 2019-07-15 DIAGNOSIS — C18.7 MALIGNANT NEOPLASM OF SIGMOID COLON: Primary | ICD-10-CM

## 2019-07-15 DIAGNOSIS — C77.9 SECONDARY ADENOCARCINOMA OF LYMPH NODE: ICD-10-CM

## 2019-07-15 LAB
ALBUMIN SERPL BCP-MCNC: 2.8 G/DL (ref 3.5–5.2)
ALP SERPL-CCNC: 110 U/L (ref 55–135)
ALT SERPL W/O P-5'-P-CCNC: 36 U/L (ref 10–44)
ANION GAP SERPL CALC-SCNC: 10 MMOL/L (ref 8–16)
AST SERPL-CCNC: 33 U/L (ref 10–40)
BILIRUB SERPL-MCNC: 0.3 MG/DL (ref 0.1–1)
BUN SERPL-MCNC: 9 MG/DL (ref 6–20)
CALCIUM SERPL-MCNC: 9.4 MG/DL (ref 8.7–10.5)
CHLORIDE SERPL-SCNC: 106 MMOL/L (ref 95–110)
CO2 SERPL-SCNC: 25 MMOL/L (ref 23–29)
CREAT SERPL-MCNC: 0.8 MG/DL (ref 0.5–1.4)
ERYTHROCYTE [DISTWIDTH] IN BLOOD BY AUTOMATED COUNT: 16.2 % (ref 11.5–14.5)
EST. GFR  (AFRICAN AMERICAN): >60 ML/MIN/1.73 M^2
EST. GFR  (NON AFRICAN AMERICAN): >60 ML/MIN/1.73 M^2
GLUCOSE SERPL-MCNC: 105 MG/DL (ref 70–110)
HCT VFR BLD AUTO: 34.9 % (ref 37–48.5)
HGB BLD-MCNC: 11.1 G/DL (ref 12–16)
IMM GRANULOCYTES # BLD AUTO: 0.93 K/UL (ref 0–0.04)
MCH RBC QN AUTO: 29.5 PG (ref 27–31)
MCHC RBC AUTO-ENTMCNC: 31.8 G/DL (ref 32–36)
MCV RBC AUTO: 93 FL (ref 82–98)
NEUTROPHILS # BLD AUTO: 9.5 K/UL (ref 1.8–7.7)
PLATELET # BLD AUTO: 165 K/UL (ref 150–350)
PMV BLD AUTO: 11.6 FL (ref 9.2–12.9)
POTASSIUM SERPL-SCNC: 3.2 MMOL/L (ref 3.5–5.1)
PROT SERPL-MCNC: 6.4 G/DL (ref 6–8.4)
RBC # BLD AUTO: 3.76 M/UL (ref 4–5.4)
SODIUM SERPL-SCNC: 141 MMOL/L (ref 136–145)
WBC # BLD AUTO: 14.17 K/UL (ref 3.9–12.7)

## 2019-07-15 PROCEDURE — 36415 COLL VENOUS BLD VENIPUNCTURE: CPT

## 2019-07-15 PROCEDURE — 3008F BODY MASS INDEX DOCD: CPT | Mod: CPTII,S$GLB,, | Performed by: INTERNAL MEDICINE

## 2019-07-15 PROCEDURE — 25000003 PHARM REV CODE 250: Performed by: INTERNAL MEDICINE

## 2019-07-15 PROCEDURE — 96413 CHEMO IV INFUSION 1 HR: CPT

## 2019-07-15 PROCEDURE — 99215 OFFICE O/P EST HI 40 MIN: CPT | Mod: S$GLB,,, | Performed by: INTERNAL MEDICINE

## 2019-07-15 PROCEDURE — 96416 CHEMO PROLONG INFUSE W/PUMP: CPT

## 2019-07-15 PROCEDURE — 80053 COMPREHEN METABOLIC PANEL: CPT

## 2019-07-15 PROCEDURE — 96367 TX/PROPH/DG ADDL SEQ IV INF: CPT

## 2019-07-15 PROCEDURE — 85027 COMPLETE CBC AUTOMATED: CPT

## 2019-07-15 PROCEDURE — 96415 CHEMO IV INFUSION ADDL HR: CPT

## 2019-07-15 PROCEDURE — 99215 PR OFFICE/OUTPT VISIT, EST, LEVL V, 40-54 MIN: ICD-10-PCS | Mod: S$GLB,,, | Performed by: INTERNAL MEDICINE

## 2019-07-15 PROCEDURE — 96411 CHEMO IV PUSH ADDL DRUG: CPT

## 2019-07-15 PROCEDURE — 96368 THER/DIAG CONCURRENT INF: CPT

## 2019-07-15 PROCEDURE — 3008F PR BODY MASS INDEX (BMI) DOCUMENTED: ICD-10-PCS | Mod: CPTII,S$GLB,, | Performed by: INTERNAL MEDICINE

## 2019-07-15 PROCEDURE — 99999 PR PBB SHADOW E&M-EST. PATIENT-LVL III: CPT | Mod: PBBFAC,,, | Performed by: INTERNAL MEDICINE

## 2019-07-15 PROCEDURE — 63600175 PHARM REV CODE 636 W HCPCS: Performed by: INTERNAL MEDICINE

## 2019-07-15 PROCEDURE — 99999 PR PBB SHADOW E&M-EST. PATIENT-LVL III: ICD-10-PCS | Mod: PBBFAC,,, | Performed by: INTERNAL MEDICINE

## 2019-07-15 RX ORDER — FLUOROURACIL 50 MG/ML
400 INJECTION, SOLUTION INTRAVENOUS
Status: CANCELLED | OUTPATIENT
Start: 2019-07-15

## 2019-07-15 RX ORDER — EPINEPHRINE 0.3 MG/.3ML
0.3 INJECTION SUBCUTANEOUS ONCE AS NEEDED
Status: CANCELLED | OUTPATIENT
Start: 2019-07-15

## 2019-07-15 RX ORDER — HEPARIN 100 UNIT/ML
500 SYRINGE INTRAVENOUS
Status: CANCELLED | OUTPATIENT
Start: 2019-07-15

## 2019-07-15 RX ORDER — SODIUM CHLORIDE 0.9 % (FLUSH) 0.9 %
10 SYRINGE (ML) INJECTION
Status: CANCELLED | OUTPATIENT
Start: 2019-07-17

## 2019-07-15 RX ORDER — DIPHENHYDRAMINE HYDROCHLORIDE 50 MG/ML
50 INJECTION INTRAMUSCULAR; INTRAVENOUS ONCE AS NEEDED
Status: CANCELLED | OUTPATIENT
Start: 2019-07-15

## 2019-07-15 RX ORDER — DIPHENHYDRAMINE HYDROCHLORIDE 50 MG/ML
50 INJECTION INTRAMUSCULAR; INTRAVENOUS ONCE AS NEEDED
Status: DISCONTINUED | OUTPATIENT
Start: 2019-07-15 | End: 2019-07-15 | Stop reason: HOSPADM

## 2019-07-15 RX ORDER — EPINEPHRINE 0.3 MG/.3ML
0.3 INJECTION SUBCUTANEOUS ONCE AS NEEDED
Status: DISCONTINUED | OUTPATIENT
Start: 2019-07-15 | End: 2019-07-15 | Stop reason: HOSPADM

## 2019-07-15 RX ORDER — HEPARIN 100 UNIT/ML
500 SYRINGE INTRAVENOUS
Status: CANCELLED | OUTPATIENT
Start: 2019-07-17

## 2019-07-15 RX ORDER — FLUOROURACIL 50 MG/ML
400 INJECTION, SOLUTION INTRAVENOUS
Status: COMPLETED | OUTPATIENT
Start: 2019-07-15 | End: 2019-07-15

## 2019-07-15 RX ORDER — SODIUM CHLORIDE 0.9 % (FLUSH) 0.9 %
10 SYRINGE (ML) INJECTION
Status: CANCELLED | OUTPATIENT
Start: 2019-07-15

## 2019-07-15 RX ADMIN — FLUOROURACIL 755 MG: 50 INJECTION, SOLUTION INTRAVENOUS at 11:07

## 2019-07-15 RX ADMIN — PALONOSETRON: 0.05 INJECTION, SOLUTION INTRAVENOUS at 08:07

## 2019-07-15 RX ADMIN — OXALIPLATIN 161 MG: 100 INJECTION, SOLUTION, CONCENTRATE INTRAVENOUS at 09:07

## 2019-07-15 RX ADMIN — DEXTROSE: 50 INJECTION, SOLUTION INTRAVENOUS at 08:07

## 2019-07-15 RX ADMIN — LEUCOVORIN CALCIUM 755 MG: 350 INJECTION, POWDER, LYOPHILIZED, FOR SOLUTION INTRAMUSCULAR; INTRAVENOUS at 09:07

## 2019-07-15 RX ADMIN — FLUOROURACIL 4535 MG: 50 INJECTION, SOLUTION INTRAVENOUS at 11:07

## 2019-07-15 NOTE — PLAN OF CARE
Problem: Adult Inpatient Plan of Care  Goal: Patient-Specific Goal (Individualization)  Outcome: Ongoing (interventions implemented as appropriate)  0830-Labs , hx, and medications reviewed, patient was seen by Md prior to arrival. Assessment completed. Discussed plan of care with patient. Patient in agreement. Chair reclined and warm blanket and snack offered.

## 2019-07-15 NOTE — Clinical Note
Schedule CBC,CMp and see NP in 2 weeks for FOLFOX. DC pump on day 3.Schedule CBC,CMP and see me in 4 weeks and for FOLFOX. DC pump on day 3.

## 2019-07-15 NOTE — PROGRESS NOTES
Subjective:       Patient ID: Irene Galo is a 53 y.o. female.    Chief Complaint: Malignant neoplasm of sigmoid colon  ONCOLOGIC HISTORY: Ms. rIene Galo is a 53-year-old female who underwent screening colonoscopy and was noted to have a circumferential partially obstructing mass in the rectosigmoid, which could not be traversed with the scope.  Inferior edge was 18 to 19 cm from the anal verge.  This was done on 01/10/2019.  Pathology from that revealed moderate dysplasia.  Malignancy could not be excluded.  She underwent a CT scan on 01/15/2019 that revealed a focal area of thickening in the rectosigmoid colon measuring 1.9 cm in length, which could possibly correspond to the abnormality seen on recent colonoscopy.  There is some  slight prominent lymph node superior to this region of thickening along the course of the inferior mesenteric vessels measuring approximately 6.5 mm.  CEA was 3.3.  She underwent sigmoid colectomy on 02/15/2019.  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                 HPI She comes in to receive cycle 8 of adjuvant chemo with FOLFOX  She notes fatigue lasts longer. She also had diarrhea which lasted 3-4 days. Noted loss of appetite and lost 5 lbs in 2 weeks  She denies any nausea, vomiting, constipation, abdominal pain, chest pain, shortness of breath, leg swelling, fatigue, pain, headache, dizziness, or mood changes. Her ECOG PS is zero She is by herself today          Review of Systems   Constitutional: Positive for appetite change. Negative for unexpected weight change.   Eyes: Positive for visual disturbance.   Respiratory: Negative for cough and shortness of breath.    Cardiovascular: Negative for chest pain.   Gastrointestinal: Positive for abdominal pain and diarrhea.   Genitourinary: Negative for frequency.   Musculoskeletal: Positive for back pain.   Skin: Negative for rash.   Neurological: Negative for headaches.   Hematological: Negative for  adenopathy.   Psychiatric/Behavioral: The patient is not nervous/anxious.        Objective:      Physical Exam   Constitutional: She is oriented to person, place, and time. She appears well-developed and well-nourished.   HENT:   Mouth/Throat: No oropharyngeal exudate.   Cardiovascular: Normal rate and normal heart sounds.   Pulmonary/Chest: Effort normal and breath sounds normal. She has no wheezes.   Abdominal: Soft. Bowel sounds are normal. There is no tenderness.   Musculoskeletal: She exhibits no edema or tenderness.   Lymphadenopathy:     She has no cervical adenopathy.   Neurological: She is alert and oriented to person, place, and time. Coordination normal.   Skin: Skin is warm and dry. No rash noted.   Psychiatric: She has a normal mood and affect. Judgment and thought content normal.   Vitals reviewed.        LABS:  WBC   Date Value Ref Range Status   07/15/2019 14.17 (H) 3.90 - 12.70 K/uL Final     Hemoglobin   Date Value Ref Range Status   07/15/2019 11.1 (L) 12.0 - 16.0 g/dL Final     Hematocrit   Date Value Ref Range Status   07/15/2019 34.9 (L) 37.0 - 48.5 % Final     Platelets   Date Value Ref Range Status   07/15/2019 165 150 - 350 K/uL Final     Gran # (ANC)   Date Value Ref Range Status   07/15/2019 9.5 (H) 1.8 - 7.7 K/uL Final     Comment:     The ANC is based on a white cell differential from an   automated cell counter. It has not been microscopically   reviewed for the presence of abnormal cells. Clinical   correlation is required.         Chemistry        Component Value Date/Time     07/15/2019 0705    K 3.2 (L) 07/15/2019 0705     07/15/2019 0705    CO2 25 07/15/2019 0705    BUN 9 07/15/2019 0705    CREATININE 0.8 07/15/2019 0705     07/15/2019 0705        Component Value Date/Time    CALCIUM 9.4 07/15/2019 0705    ALKPHOS 110 07/15/2019 0705    AST 33 07/15/2019 0705    ALT 36 07/15/2019 0705    BILITOT 0.3 07/15/2019 0705    ESTGFRAFRICA >60.0 07/15/2019 0705     EGFRNONAA >60.0 07/15/2019 0705          Assessment:       1. Malignant neoplasm of sigmoid colon    2. Secondary adenocarcinoma of lymph node        Plan:         1,2. She will proceed with FOLFOX and will return in 2 weeks for next cycle    Above care plan was discussed with patient and accompanying wife and all questions were addressed to their satisfaction

## 2019-07-15 NOTE — PLAN OF CARE
Problem: Adult Inpatient Plan of Care  Goal: Plan of Care Review  Outcome: Ongoing (interventions implemented as appropriate)  1142- Portable pump infusing on discharge all clamps open and connections secured. No kinks in tubing noted. Settings double checked by DUSTIN Aguirre RN. Educated patient to be sure pump should say run and volume infused number should increase daily as reservoir volume decreases. Instructed to call with any issues with pump. Phone numbers given.   Instructed to call provider for any questions or concerns.

## 2019-07-17 ENCOUNTER — INFUSION (OUTPATIENT)
Dept: INFUSION THERAPY | Facility: HOSPITAL | Age: 54
End: 2019-07-17
Attending: INTERNAL MEDICINE
Payer: COMMERCIAL

## 2019-07-17 VITALS
DIASTOLIC BLOOD PRESSURE: 89 MMHG | SYSTOLIC BLOOD PRESSURE: 131 MMHG | HEART RATE: 82 BPM | RESPIRATION RATE: 18 BRPM | TEMPERATURE: 98 F

## 2019-07-17 DIAGNOSIS — C18.7 MALIGNANT NEOPLASM OF SIGMOID COLON: Primary | ICD-10-CM

## 2019-07-17 PROCEDURE — A4216 STERILE WATER/SALINE, 10 ML: HCPCS | Performed by: INTERNAL MEDICINE

## 2019-07-17 PROCEDURE — 96377 APPLICATON ON-BODY INJECTOR: CPT

## 2019-07-17 PROCEDURE — 25000003 PHARM REV CODE 250: Performed by: INTERNAL MEDICINE

## 2019-07-17 PROCEDURE — 63600175 PHARM REV CODE 636 W HCPCS: Performed by: INTERNAL MEDICINE

## 2019-07-17 RX ORDER — HEPARIN 100 UNIT/ML
500 SYRINGE INTRAVENOUS
Status: DISCONTINUED | OUTPATIENT
Start: 2019-07-17 | End: 2019-07-17 | Stop reason: HOSPADM

## 2019-07-17 RX ORDER — SODIUM CHLORIDE 0.9 % (FLUSH) 0.9 %
10 SYRINGE (ML) INJECTION
Status: DISCONTINUED | OUTPATIENT
Start: 2019-07-17 | End: 2019-07-17 | Stop reason: HOSPADM

## 2019-07-17 RX ADMIN — PEGFILGRASTIM 6 MG: KIT SUBCUTANEOUS at 09:07

## 2019-07-17 RX ADMIN — HEPARIN 500 UNITS: 100 SYRINGE at 09:07

## 2019-07-17 RX ADMIN — Medication 10 ML: at 09:07

## 2019-07-17 NOTE — NURSING
1005- Patient arrived ambulatory to the unit for pump dc with infusion to portable pump already complete.  Patient has no complaints, shows no signs of distress.  Port flushed and heparin locked and then de-accessed.  Patient had neulasta onbody device applied to back of R arm, verbalized understanding monitoring and use of device prior to discharge.

## 2019-07-26 PROBLEM — K63.89 COLONIC MASS: Status: RESOLVED | Noted: 2019-02-15 | Resolved: 2019-07-26

## 2019-07-26 PROBLEM — D37.5: Status: RESOLVED | Noted: 2019-02-05 | Resolved: 2019-07-26

## 2019-07-26 PROBLEM — Z12.11 ENCOUNTER FOR SCREENING COLONOSCOPY: Status: RESOLVED | Noted: 2019-01-10 | Resolved: 2019-07-26

## 2019-07-26 NOTE — PROGRESS NOTES
Subjective:       Patient ID: Irene Galo    Chief Complaint: Malignant neoplasm of sigmoid colon    HPI  Irene Galo is a 53 y.o. female, patient of Dr. Welch, to clinic for 2 week follow up and cycle #9 of adjuvant chemo with FOLFOX. Patient tolerated this past cycle with imp[rovement. She took Prilosec for acid reflux. She had no issues with nausea, vomiting or diarrhea. Patient reports neuropathy to bilateral feet and hands- resolved prior to next cycle. Appetite good and weight is stable.     ECOG 1.    Oncologic History:  ONCOLOGIC HISTORY: Ms. Irene Galo is a 53-year-old female who underwent screening colonoscopy and was noted to have a circumferential partially obstructing mass in the rectosigmoid, which could not be traversed with the scope.  Inferior edge was 18 to 19 cm from the anal verge.  This was done on 01/10/2019.  Pathology from that revealed moderate dysplasia.  Malignancy could not be excluded.  She underwent a CT scan on 01/15/2019 that revealed a focal area of thickening in the rectosigmoid colon measuring 1.9 cm in length, which could possibly correspond to the abnormality seen on recent colonoscopy.  There is some  slight prominent lymph node superior to this region of thickening along the course of the inferior mesenteric vessels measuring approximately 6.5 mm.  CEA was 3.3.  She underwent sigmoid colectomy on 02/15/2019.  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    Review of Systems   Constitutional: Negative for appetite change and unexpected weight change.   Eyes: Negative for visual disturbance.   Respiratory: Negative for cough and shortness of breath.    Cardiovascular: Negative for chest pain.   Gastrointestinal: Positive for diarrhea. Negative for abdominal pain, nausea and vomiting.   Genitourinary: Negative for frequency.   Musculoskeletal: Positive for back pain.   Skin: Negative for rash.   Neurological: Negative for headaches.   Hematological:  Negative for adenopathy.   Psychiatric/Behavioral: The patient is not nervous/anxious.        Allergies:  Review of patient's allergies indicates:   Allergen Reactions    No known drug allergies        Medications:  Current Outpatient Medications   Medication Sig Dispense Refill    famotidine (PEPCID) 20 MG tablet Take 20 mg by mouth daily as needed for Heartburn (acid reflux).      ibuprofen (ADVIL) 200 MG tablet Take 200 mg by mouth every 6 (six) hours as needed for Pain.      LO LOESTRIN FE 1 mg-10 mcg (24)/10 mcg (2) Tab Take 1 tablet by mouth once daily. 84 tablet 4    loratadine (CLARITIN) 10 mg tablet Take 10 mg by mouth once daily.      multivitamin with minerals tablet Take 1 tablet by mouth once daily.      ondansetron (ZOFRAN-ODT) 8 MG TbDL Take 1 tablet (8 mg total) by mouth every 6 (six) hours as needed. 60 tablet 4    cetirizine (ZYRTEC) 10 MG tablet Take 10 mg by mouth every evening.       HYDROcodone-acetaminophen (NORCO) 5-325 mg per tablet Take 1 tablet by mouth every 6 (six) hours as needed for Pain. 21 tablet 0    lidocaine-prilocaine (EMLA) cream Apply to affected area on days of chemotherapy 30 g 6    promethazine (PHENERGAN) 25 MG tablet Take 25 mg by mouth every 4 (four) hours.       No current facility-administered medications for this visit.        PMH:  Past Medical History:   Diagnosis Date    Allergy     Bilateral hearing loss     Colon cancer     GERD (gastroesophageal reflux disease)     Uterine fibroid        PSH:  Past Surgical History:   Procedure Laterality Date    COLONOSCOPY WITH BIOPSY N/A 1/10/2019    Performed by [facility] Augustin [facility] at [facility]O    ATZCFGXRF-ZDGP-I-CATH N/A 3/28/2019    Performed by Vladimir Bernal Jr., [facility] at [facility] OR 2ND FLR    lasix  2000    both eyes    MOBILIZATION, SPLENIC FLEXURE  2/15/2019    Performed by [facility] at [facility] OR 2ND FLR    STAPEDECTOMY  2009    STAPEDECTOMY Right 6/6/2013    Performed by Felix Azevedo MD  at [facility] OR 2ND FLR    XI ROBOTIC COLECTOMY, SIGMOID N/A 2/15/2019    Performed by Zachery Augustin MD at [facility] OR 2ND FLR       FamHx:  Family History   Problem Relation Age of Onset    Diverticulitis Father     Lung cancer Father     Breast cancer Paternal Grandmother 70    Heart disease Brother 45        Tobacco smoker     Diabetes Brother     Diabetes Brother        SocHx:  Social History     Socioeconomic History    Marital status:      Spouse name: Not on file    Number of children: Not on file    Years of education: Not on file    Highest education level: Not on file   Occupational History    Not on file   Social Needs    Financial resource strain: Not on file    Food insecurity:     Worry: Not on file     Inability: Not on file    Transportation needs:     Medical: Not on file     Non-medical: Not on file   Tobacco Use    Smoking status: Never Smoker    Smokeless tobacco: Never Used   Substance and Sexual Activity    Alcohol use: Yes     Comment: 1 glass of wine , 2-3 times a week    Drug use: No    Sexual activity: Yes     Partners: Male     Birth control/protection: Other-see comments, OCP     Comment: -- with vasectomy   Lifestyle    Physical activity:     Days per week: Not on file     Minutes per session: Not on file    Stress: Not on file   Relationships    Social connections:     Talks on phone: Not on file     Gets together: Not on file     Attends Yazdanism service: Not on file     Active member of club or organization: Not on file     Attends meetings of clubs or organizations: Not on file     Relationship status: Not on file   Other Topics Concern    Not on file   Social History Narrative    Not on file       Distress Score    Distress Score: 0        Practical Problems Physical Problems                                                   Family Problems                                         Emotional Problems                                                          Spiritual/Religions Concerns               Other Problems                Objective:      Physical Exam   Constitutional: She is oriented to person, place, and time. She appears well-developed and well-nourished. No distress.   HENT:   Head: Normocephalic and atraumatic.   Nose: Nose normal.   Mouth/Throat: Oropharynx is clear and moist. No oropharyngeal exudate.   Eyes: Pupils are equal, round, and reactive to light. Conjunctivae and EOM are normal. Right eye exhibits no discharge. Left eye exhibits no discharge.   Neck: Normal range of motion. Neck supple. No tracheal deviation present.   Cardiovascular: Normal rate, regular rhythm, normal heart sounds and normal pulses.   No swelling to bilateral lower extremities.    Pulmonary/Chest: Effort normal and breath sounds normal. She has no wheezes. She has no rales.   Abdominal: Soft. Bowel sounds are normal. She exhibits no distension. There is no tenderness. There is no guarding.   Musculoskeletal: Normal range of motion. She exhibits no edema or tenderness.   No spinal or paraspinal tenderness to palpation.       Lymphadenopathy:     She has no cervical adenopathy.   Neurological: She is alert and oriented to person, place, and time.   Skin: Skin is warm, dry and intact. No rash noted. No erythema. No pallor.   Psychiatric: She has a normal mood and affect. Her behavior is normal. Thought content normal.   Nursing note and vitals reviewed.        LABS:  WBC   Date Value Ref Range Status   07/29/2019 15.76 (H) 3.90 - 12.70 K/uL Final     Hemoglobin   Date Value Ref Range Status   07/29/2019 11.6 (L) 12.0 - 16.0 g/dL Final     Hematocrit   Date Value Ref Range Status   07/29/2019 38.2 37.0 - 48.5 % Final     Platelets   Date Value Ref Range Status   07/29/2019 88 (L) 150 - 350 K/uL Final       Chemistry        Component Value Date/Time     07/29/2019 1209    K 3.6 07/29/2019 1209     07/29/2019 1209    CO2 28 07/29/2019 1209    BUN 7 07/29/2019  1209    CREATININE 0.8 07/29/2019 1209    GLU 91 07/29/2019 1209        Component Value Date/Time    CALCIUM 9.8 07/29/2019 1209    ALKPHOS 148 (H) 07/29/2019 1209    AST 27 07/29/2019 1209    ALT 19 07/29/2019 1209    BILITOT 0.4 07/29/2019 1209    ESTGFRAFRICA >60.0 07/29/2019 1209    EGFRNONAA >60.0 07/29/2019 1209              Assessment:       1. Malignant neoplasm of sigmoid colon    2. Secondary adenocarcinoma of lymph node    3. Chemotherapy induced neutropenia    4. Antineoplastic chemotherapy induced anemia    5. Chemotherapy-induced thrombocytopenia    6. Hypokalemia          Plan:     1,2,3,4,5- Labs acceptable to proceed with cycle #9 of adjuvant FOLFOX. Platelet count borderline and she understands next cycle will likely be held due to it. Advised to monitor for new onset bleeding as this may occur with flossing, aggressive teeth brushing, etc. She will d/c pump on day 3 with neulasta OBI.    RTC 2 weeks with labs (CBC,CMP), to see Dr. Welch and cycle #10 of adjuvant FOLFOX. D/c pump and neulasta OBI on day 3.    6- Resolved with improvement in diarrhea.    Patient is in agreement with the proposed treatment plan. All questions were answered to the patient's satisfaction. Pt knows to call clinic if anything is needed before the next clinic visit.    RACHID Encarnacion  Hematology and Medical Oncology

## 2019-07-29 ENCOUNTER — OFFICE VISIT (OUTPATIENT)
Dept: HEMATOLOGY/ONCOLOGY | Facility: CLINIC | Age: 54
End: 2019-07-29
Payer: COMMERCIAL

## 2019-07-29 ENCOUNTER — LAB VISIT (OUTPATIENT)
Dept: LAB | Facility: HOSPITAL | Age: 54
End: 2019-07-29
Attending: INTERNAL MEDICINE
Payer: COMMERCIAL

## 2019-07-29 ENCOUNTER — INFUSION (OUTPATIENT)
Dept: INFUSION THERAPY | Facility: HOSPITAL | Age: 54
End: 2019-07-29
Attending: INTERNAL MEDICINE
Payer: COMMERCIAL

## 2019-07-29 VITALS
HEIGHT: 62 IN | DIASTOLIC BLOOD PRESSURE: 93 MMHG | RESPIRATION RATE: 18 BRPM | TEMPERATURE: 99 F | SYSTOLIC BLOOD PRESSURE: 168 MMHG | HEART RATE: 74 BPM | BODY MASS INDEX: 32.57 KG/M2 | WEIGHT: 177 LBS

## 2019-07-29 VITALS
TEMPERATURE: 98 F | SYSTOLIC BLOOD PRESSURE: 145 MMHG | HEIGHT: 63 IN | HEART RATE: 75 BPM | DIASTOLIC BLOOD PRESSURE: 93 MMHG | OXYGEN SATURATION: 97 % | BODY MASS INDEX: 31.36 KG/M2 | RESPIRATION RATE: 16 BRPM | WEIGHT: 177 LBS

## 2019-07-29 DIAGNOSIS — T45.1X5A CHEMOTHERAPY-INDUCED THROMBOCYTOPENIA: ICD-10-CM

## 2019-07-29 DIAGNOSIS — E87.6 HYPOKALEMIA: ICD-10-CM

## 2019-07-29 DIAGNOSIS — D70.1 CHEMOTHERAPY INDUCED NEUTROPENIA: ICD-10-CM

## 2019-07-29 DIAGNOSIS — C77.9 SECONDARY ADENOCARCINOMA OF LYMPH NODE: ICD-10-CM

## 2019-07-29 DIAGNOSIS — C18.7 MALIGNANT NEOPLASM OF SIGMOID COLON: Primary | ICD-10-CM

## 2019-07-29 DIAGNOSIS — D69.59 CHEMOTHERAPY-INDUCED THROMBOCYTOPENIA: ICD-10-CM

## 2019-07-29 DIAGNOSIS — T45.1X5A ANTINEOPLASTIC CHEMOTHERAPY INDUCED ANEMIA: ICD-10-CM

## 2019-07-29 DIAGNOSIS — C18.9 MALIGNANT NEOPLASM OF COLON, UNSPECIFIED PART OF COLON: Primary | ICD-10-CM

## 2019-07-29 DIAGNOSIS — T45.1X5A CHEMOTHERAPY INDUCED NEUTROPENIA: ICD-10-CM

## 2019-07-29 DIAGNOSIS — C18.7 MALIGNANT NEOPLASM OF SIGMOID COLON: ICD-10-CM

## 2019-07-29 DIAGNOSIS — D64.81 ANTINEOPLASTIC CHEMOTHERAPY INDUCED ANEMIA: ICD-10-CM

## 2019-07-29 LAB
ALBUMIN SERPL BCP-MCNC: 3.4 G/DL (ref 3.5–5.2)
ALP SERPL-CCNC: 148 U/L (ref 55–135)
ALT SERPL W/O P-5'-P-CCNC: 19 U/L (ref 10–44)
ANION GAP SERPL CALC-SCNC: 7 MMOL/L (ref 8–16)
AST SERPL-CCNC: 27 U/L (ref 10–40)
BILIRUB SERPL-MCNC: 0.4 MG/DL (ref 0.1–1)
BUN SERPL-MCNC: 7 MG/DL (ref 6–20)
CALCIUM SERPL-MCNC: 9.8 MG/DL (ref 8.7–10.5)
CHLORIDE SERPL-SCNC: 107 MMOL/L (ref 95–110)
CO2 SERPL-SCNC: 28 MMOL/L (ref 23–29)
CREAT SERPL-MCNC: 0.8 MG/DL (ref 0.5–1.4)
ERYTHROCYTE [DISTWIDTH] IN BLOOD BY AUTOMATED COUNT: 18.7 % (ref 11.5–14.5)
EST. GFR  (AFRICAN AMERICAN): >60 ML/MIN/1.73 M^2
EST. GFR  (NON AFRICAN AMERICAN): >60 ML/MIN/1.73 M^2
GLUCOSE SERPL-MCNC: 91 MG/DL (ref 70–110)
HCT VFR BLD AUTO: 38.2 % (ref 37–48.5)
HGB BLD-MCNC: 11.6 G/DL (ref 12–16)
IMM GRANULOCYTES # BLD AUTO: 0.75 K/UL (ref 0–0.04)
MCH RBC QN AUTO: 29.7 PG (ref 27–31)
MCHC RBC AUTO-ENTMCNC: 30.4 G/DL (ref 32–36)
MCV RBC AUTO: 98 FL (ref 82–98)
NEUTROPHILS # BLD AUTO: 10.7 K/UL (ref 1.8–7.7)
PLATELET # BLD AUTO: 88 K/UL (ref 150–350)
PMV BLD AUTO: 12 FL (ref 9.2–12.9)
POTASSIUM SERPL-SCNC: 3.6 MMOL/L (ref 3.5–5.1)
PROT SERPL-MCNC: 7.1 G/DL (ref 6–8.4)
RBC # BLD AUTO: 3.9 M/UL (ref 4–5.4)
SODIUM SERPL-SCNC: 142 MMOL/L (ref 136–145)
WBC # BLD AUTO: 15.76 K/UL (ref 3.9–12.7)

## 2019-07-29 PROCEDURE — 63600175 PHARM REV CODE 636 W HCPCS: Performed by: INTERNAL MEDICINE

## 2019-07-29 PROCEDURE — 96411 CHEMO IV PUSH ADDL DRUG: CPT

## 2019-07-29 PROCEDURE — 99999 PR PBB SHADOW E&M-EST. PATIENT-LVL III: CPT | Mod: PBBFAC,,, | Performed by: NURSE PRACTITIONER

## 2019-07-29 PROCEDURE — 96368 THER/DIAG CONCURRENT INF: CPT

## 2019-07-29 PROCEDURE — 36415 COLL VENOUS BLD VENIPUNCTURE: CPT

## 2019-07-29 PROCEDURE — 63600175 PHARM REV CODE 636 W HCPCS: Mod: JG | Performed by: INTERNAL MEDICINE

## 2019-07-29 PROCEDURE — 96521 REFILL/MAINT PORTABLE PUMP: CPT

## 2019-07-29 PROCEDURE — 99214 OFFICE O/P EST MOD 30 MIN: CPT | Mod: S$GLB,,, | Performed by: NURSE PRACTITIONER

## 2019-07-29 PROCEDURE — 85027 COMPLETE CBC AUTOMATED: CPT

## 2019-07-29 PROCEDURE — 3008F BODY MASS INDEX DOCD: CPT | Mod: CPTII,S$GLB,, | Performed by: NURSE PRACTITIONER

## 2019-07-29 PROCEDURE — 96415 CHEMO IV INFUSION ADDL HR: CPT

## 2019-07-29 PROCEDURE — 3008F PR BODY MASS INDEX (BMI) DOCUMENTED: ICD-10-PCS | Mod: CPTII,S$GLB,, | Performed by: NURSE PRACTITIONER

## 2019-07-29 PROCEDURE — 96365 THER/PROPH/DIAG IV INF INIT: CPT

## 2019-07-29 PROCEDURE — 96413 CHEMO IV INFUSION 1 HR: CPT

## 2019-07-29 PROCEDURE — 96367 TX/PROPH/DG ADDL SEQ IV INF: CPT

## 2019-07-29 PROCEDURE — 80053 COMPREHEN METABOLIC PANEL: CPT

## 2019-07-29 PROCEDURE — 99214 PR OFFICE/OUTPT VISIT, EST, LEVL IV, 30-39 MIN: ICD-10-PCS | Mod: S$GLB,,, | Performed by: NURSE PRACTITIONER

## 2019-07-29 PROCEDURE — 99999 PR PBB SHADOW E&M-EST. PATIENT-LVL III: ICD-10-PCS | Mod: PBBFAC,,, | Performed by: NURSE PRACTITIONER

## 2019-07-29 RX ORDER — SODIUM CHLORIDE 0.9 % (FLUSH) 0.9 %
10 SYRINGE (ML) INJECTION
Status: CANCELLED | OUTPATIENT
Start: 2019-07-29

## 2019-07-29 RX ORDER — DIPHENHYDRAMINE HYDROCHLORIDE 50 MG/ML
50 INJECTION INTRAMUSCULAR; INTRAVENOUS ONCE AS NEEDED
Status: CANCELLED | OUTPATIENT
Start: 2019-07-29

## 2019-07-29 RX ORDER — SODIUM CHLORIDE 0.9 % (FLUSH) 0.9 %
10 SYRINGE (ML) INJECTION
Status: DISCONTINUED | OUTPATIENT
Start: 2019-07-29 | End: 2019-07-29 | Stop reason: HOSPADM

## 2019-07-29 RX ORDER — DIPHENHYDRAMINE HYDROCHLORIDE 50 MG/ML
50 INJECTION INTRAMUSCULAR; INTRAVENOUS ONCE AS NEEDED
Status: DISCONTINUED | OUTPATIENT
Start: 2019-07-29 | End: 2019-07-29 | Stop reason: HOSPADM

## 2019-07-29 RX ORDER — FLUOROURACIL 50 MG/ML
400 INJECTION, SOLUTION INTRAVENOUS
Status: CANCELLED | OUTPATIENT
Start: 2019-07-29

## 2019-07-29 RX ORDER — EPINEPHRINE 0.3 MG/.3ML
0.3 INJECTION SUBCUTANEOUS ONCE AS NEEDED
Status: CANCELLED | OUTPATIENT
Start: 2019-07-29

## 2019-07-29 RX ORDER — LORATADINE 10 MG/1
10 TABLET ORAL DAILY
COMMUNITY
End: 2020-01-10

## 2019-07-29 RX ORDER — HEPARIN 100 UNIT/ML
500 SYRINGE INTRAVENOUS
Status: DISCONTINUED | OUTPATIENT
Start: 2019-07-29 | End: 2019-07-29 | Stop reason: HOSPADM

## 2019-07-29 RX ORDER — IBUPROFEN 200 MG
200 TABLET ORAL EVERY 6 HOURS PRN
COMMUNITY

## 2019-07-29 RX ORDER — HEPARIN 100 UNIT/ML
500 SYRINGE INTRAVENOUS
Status: CANCELLED | OUTPATIENT
Start: 2019-07-31

## 2019-07-29 RX ORDER — HEPARIN 100 UNIT/ML
500 SYRINGE INTRAVENOUS
Status: CANCELLED | OUTPATIENT
Start: 2019-07-29

## 2019-07-29 RX ORDER — SODIUM CHLORIDE 0.9 % (FLUSH) 0.9 %
10 SYRINGE (ML) INJECTION
Status: CANCELLED | OUTPATIENT
Start: 2019-07-31

## 2019-07-29 RX ORDER — EPINEPHRINE 0.3 MG/.3ML
0.3 INJECTION SUBCUTANEOUS ONCE AS NEEDED
Status: DISCONTINUED | OUTPATIENT
Start: 2019-07-29 | End: 2019-07-29 | Stop reason: HOSPADM

## 2019-07-29 RX ORDER — FLUOROURACIL 50 MG/ML
400 INJECTION, SOLUTION INTRAVENOUS
Status: COMPLETED | OUTPATIENT
Start: 2019-07-29 | End: 2019-07-29

## 2019-07-29 RX ADMIN — DEXTROSE: 50 INJECTION, SOLUTION INTRAVENOUS at 03:07

## 2019-07-29 RX ADMIN — FLUOROURACIL 755 MG: 50 INJECTION, SOLUTION INTRAVENOUS at 05:07

## 2019-07-29 RX ADMIN — OXALIPLATIN 161 MG: 5 INJECTION, SOLUTION, CONCENTRATE INTRAVENOUS at 03:07

## 2019-07-29 RX ADMIN — LEUCOVORIN CALCIUM 755 MG: 350 INJECTION, POWDER, LYOPHILIZED, FOR SOLUTION INTRAMUSCULAR; INTRAVENOUS at 03:07

## 2019-07-29 RX ADMIN — SODIUM CHLORIDE: 9 INJECTION, SOLUTION INTRAVENOUS at 02:07

## 2019-07-29 RX ADMIN — FLUOROURACIL 4535 MG: 50 INJECTION, SOLUTION INTRAVENOUS at 05:07

## 2019-07-29 RX ADMIN — DEXAMETHASONE SODIUM PHOSPHATE: 4 INJECTION, SOLUTION INTRAMUSCULAR; INTRAVENOUS at 02:07

## 2019-07-29 NOTE — Clinical Note
RTC 2 weeks with labs (CBc,CMP), to see Dr. Welch and cycle #10 of adjuvant FOLFOX. D/c pump and neulasta OBI on day 3.

## 2019-07-29 NOTE — PLAN OF CARE
Problem: Adult Inpatient Plan of Care  Goal: Plan of Care Review  Outcome: Ongoing (interventions implemented as appropriate)  Infusion completed, pt tolerated well; ambulatory pump connected to pt, infusing w/out difficulty; pt instructed to avoid cold sensation; discussed when to contact MD, when to report to ER; pt declined AVS, pt and son verbalized understanding of all discussed and to report for pump d/c on Wed 7/31/19 at 1515

## 2019-07-29 NOTE — NURSING
1410  Pt here for FOLFOX infusion, accompanied by son, no new complaints or concerns at present; pt states she was given option to treat today or next cycle r/t platelets of 88, chose today; discussed treatment plan for today, all questions answered and pt agrees to proceed

## 2019-07-30 ENCOUNTER — PATIENT MESSAGE (OUTPATIENT)
Dept: HEMATOLOGY/ONCOLOGY | Facility: CLINIC | Age: 54
End: 2019-07-30

## 2019-07-31 ENCOUNTER — INFUSION (OUTPATIENT)
Dept: INFUSION THERAPY | Facility: HOSPITAL | Age: 54
End: 2019-07-31
Attending: INTERNAL MEDICINE
Payer: COMMERCIAL

## 2019-07-31 VITALS
DIASTOLIC BLOOD PRESSURE: 79 MMHG | TEMPERATURE: 98 F | SYSTOLIC BLOOD PRESSURE: 131 MMHG | RESPIRATION RATE: 18 BRPM | HEART RATE: 84 BPM

## 2019-07-31 DIAGNOSIS — C18.7 MALIGNANT NEOPLASM OF SIGMOID COLON: Primary | ICD-10-CM

## 2019-07-31 PROCEDURE — 96523 IRRIG DRUG DELIVERY DEVICE: CPT

## 2019-07-31 PROCEDURE — 96377 APPLICATON ON-BODY INJECTOR: CPT

## 2019-07-31 PROCEDURE — 25000003 PHARM REV CODE 250: Performed by: INTERNAL MEDICINE

## 2019-07-31 PROCEDURE — A4216 STERILE WATER/SALINE, 10 ML: HCPCS | Performed by: INTERNAL MEDICINE

## 2019-07-31 PROCEDURE — 63600175 PHARM REV CODE 636 W HCPCS: Mod: JG | Performed by: INTERNAL MEDICINE

## 2019-07-31 RX ORDER — SODIUM CHLORIDE 0.9 % (FLUSH) 0.9 %
10 SYRINGE (ML) INJECTION
Status: DISCONTINUED | OUTPATIENT
Start: 2019-07-31 | End: 2019-07-31 | Stop reason: HOSPADM

## 2019-07-31 RX ORDER — HEPARIN 100 UNIT/ML
500 SYRINGE INTRAVENOUS
Status: DISCONTINUED | OUTPATIENT
Start: 2019-07-31 | End: 2019-07-31 | Stop reason: HOSPADM

## 2019-07-31 RX ADMIN — Medication 10 ML: at 03:07

## 2019-07-31 RX ADMIN — HEPARIN 500 UNITS: 100 SYRINGE at 03:07

## 2019-07-31 RX ADMIN — PEGFILGRASTIM 6 MG: KIT SUBCUTANEOUS at 03:07

## 2019-08-05 ENCOUNTER — PATIENT MESSAGE (OUTPATIENT)
Dept: HEMATOLOGY/ONCOLOGY | Facility: CLINIC | Age: 54
End: 2019-08-05

## 2019-08-12 ENCOUNTER — INFUSION (OUTPATIENT)
Dept: INFUSION THERAPY | Facility: HOSPITAL | Age: 54
End: 2019-08-12
Attending: INTERNAL MEDICINE
Payer: COMMERCIAL

## 2019-08-12 ENCOUNTER — LAB VISIT (OUTPATIENT)
Dept: LAB | Facility: HOSPITAL | Age: 54
End: 2019-08-12
Attending: INTERNAL MEDICINE
Payer: COMMERCIAL

## 2019-08-12 ENCOUNTER — OFFICE VISIT (OUTPATIENT)
Dept: HEMATOLOGY/ONCOLOGY | Facility: CLINIC | Age: 54
End: 2019-08-12
Payer: COMMERCIAL

## 2019-08-12 VITALS
HEIGHT: 62 IN | HEART RATE: 72 BPM | TEMPERATURE: 98 F | RESPIRATION RATE: 18 BRPM | BODY MASS INDEX: 32.02 KG/M2 | WEIGHT: 174 LBS | DIASTOLIC BLOOD PRESSURE: 97 MMHG | SYSTOLIC BLOOD PRESSURE: 170 MMHG

## 2019-08-12 VITALS
DIASTOLIC BLOOD PRESSURE: 102 MMHG | TEMPERATURE: 98 F | SYSTOLIC BLOOD PRESSURE: 162 MMHG | HEIGHT: 62 IN | RESPIRATION RATE: 17 BRPM | BODY MASS INDEX: 32.14 KG/M2 | WEIGHT: 174.63 LBS | OXYGEN SATURATION: 96 % | HEART RATE: 77 BPM

## 2019-08-12 DIAGNOSIS — D70.1 CHEMOTHERAPY INDUCED NEUTROPENIA: ICD-10-CM

## 2019-08-12 DIAGNOSIS — T45.1X5A CHEMOTHERAPY-INDUCED NEUROPATHY: ICD-10-CM

## 2019-08-12 DIAGNOSIS — C18.7 MALIGNANT NEOPLASM OF SIGMOID COLON: Primary | ICD-10-CM

## 2019-08-12 DIAGNOSIS — C77.9 SECONDARY ADENOCARCINOMA OF LYMPH NODE: ICD-10-CM

## 2019-08-12 DIAGNOSIS — C18.7 MALIGNANT NEOPLASM OF SIGMOID COLON: ICD-10-CM

## 2019-08-12 DIAGNOSIS — D69.59 CHEMOTHERAPY-INDUCED THROMBOCYTOPENIA: ICD-10-CM

## 2019-08-12 DIAGNOSIS — T45.1X5A CHEMOTHERAPY INDUCED NEUTROPENIA: ICD-10-CM

## 2019-08-12 DIAGNOSIS — T45.1X5A CHEMOTHERAPY-INDUCED THROMBOCYTOPENIA: ICD-10-CM

## 2019-08-12 DIAGNOSIS — K21.9 GASTROESOPHAGEAL REFLUX DISEASE, ESOPHAGITIS PRESENCE NOT SPECIFIED: ICD-10-CM

## 2019-08-12 DIAGNOSIS — G62.0 CHEMOTHERAPY-INDUCED NEUROPATHY: ICD-10-CM

## 2019-08-12 LAB
ALBUMIN SERPL BCP-MCNC: 3.7 G/DL (ref 3.5–5.2)
ALP SERPL-CCNC: 130 U/L (ref 55–135)
ALT SERPL W/O P-5'-P-CCNC: 24 U/L (ref 10–44)
ANION GAP SERPL CALC-SCNC: 8 MMOL/L (ref 8–16)
AST SERPL-CCNC: 32 U/L (ref 10–40)
BILIRUB SERPL-MCNC: 0.5 MG/DL (ref 0.1–1)
BUN SERPL-MCNC: 7 MG/DL (ref 6–20)
CALCIUM SERPL-MCNC: 9.6 MG/DL (ref 8.7–10.5)
CHLORIDE SERPL-SCNC: 106 MMOL/L (ref 95–110)
CO2 SERPL-SCNC: 26 MMOL/L (ref 23–29)
CREAT SERPL-MCNC: 0.8 MG/DL (ref 0.5–1.4)
ERYTHROCYTE [DISTWIDTH] IN BLOOD BY AUTOMATED COUNT: 19 % (ref 11.5–14.5)
EST. GFR  (AFRICAN AMERICAN): >60 ML/MIN/1.73 M^2
EST. GFR  (NON AFRICAN AMERICAN): >60 ML/MIN/1.73 M^2
GLUCOSE SERPL-MCNC: 89 MG/DL (ref 70–110)
HCT VFR BLD AUTO: 39.2 % (ref 37–48.5)
HGB BLD-MCNC: 12.3 G/DL (ref 12–16)
IMM GRANULOCYTES # BLD AUTO: 0.29 K/UL (ref 0–0.04)
MCH RBC QN AUTO: 30.9 PG (ref 27–31)
MCHC RBC AUTO-ENTMCNC: 31.4 G/DL (ref 32–36)
MCV RBC AUTO: 99 FL (ref 82–98)
NEUTROPHILS # BLD AUTO: 6.4 K/UL (ref 1.8–7.7)
PLATELET # BLD AUTO: 95 K/UL (ref 150–350)
PMV BLD AUTO: 11.9 FL (ref 9.2–12.9)
POTASSIUM SERPL-SCNC: 4 MMOL/L (ref 3.5–5.1)
PROT SERPL-MCNC: 7.4 G/DL (ref 6–8.4)
RBC # BLD AUTO: 3.98 M/UL (ref 4–5.4)
SODIUM SERPL-SCNC: 140 MMOL/L (ref 136–145)
WBC # BLD AUTO: 10.41 K/UL (ref 3.9–12.7)

## 2019-08-12 PROCEDURE — 96368 THER/DIAG CONCURRENT INF: CPT

## 2019-08-12 PROCEDURE — 99999 PR PBB SHADOW E&M-EST. PATIENT-LVL IV: ICD-10-PCS | Mod: PBBFAC,,, | Performed by: INTERNAL MEDICINE

## 2019-08-12 PROCEDURE — 99215 OFFICE O/P EST HI 40 MIN: CPT | Mod: S$GLB,,, | Performed by: INTERNAL MEDICINE

## 2019-08-12 PROCEDURE — 96411 CHEMO IV PUSH ADDL DRUG: CPT

## 2019-08-12 PROCEDURE — 36415 COLL VENOUS BLD VENIPUNCTURE: CPT

## 2019-08-12 PROCEDURE — 96416 CHEMO PROLONG INFUSE W/PUMP: CPT

## 2019-08-12 PROCEDURE — 3008F PR BODY MASS INDEX (BMI) DOCUMENTED: ICD-10-PCS | Mod: CPTII,S$GLB,, | Performed by: INTERNAL MEDICINE

## 2019-08-12 PROCEDURE — 96415 CHEMO IV INFUSION ADDL HR: CPT

## 2019-08-12 PROCEDURE — 96413 CHEMO IV INFUSION 1 HR: CPT

## 2019-08-12 PROCEDURE — 3008F BODY MASS INDEX DOCD: CPT | Mod: CPTII,S$GLB,, | Performed by: INTERNAL MEDICINE

## 2019-08-12 PROCEDURE — 63600175 PHARM REV CODE 636 W HCPCS: Performed by: INTERNAL MEDICINE

## 2019-08-12 PROCEDURE — 99215 PR OFFICE/OUTPT VISIT, EST, LEVL V, 40-54 MIN: ICD-10-PCS | Mod: S$GLB,,, | Performed by: INTERNAL MEDICINE

## 2019-08-12 PROCEDURE — 99999 PR PBB SHADOW E&M-EST. PATIENT-LVL IV: CPT | Mod: PBBFAC,,, | Performed by: INTERNAL MEDICINE

## 2019-08-12 PROCEDURE — 80053 COMPREHEN METABOLIC PANEL: CPT

## 2019-08-12 PROCEDURE — 85027 COMPLETE CBC AUTOMATED: CPT

## 2019-08-12 PROCEDURE — 96367 TX/PROPH/DG ADDL SEQ IV INF: CPT

## 2019-08-12 RX ORDER — EPINEPHRINE 0.3 MG/.3ML
0.3 INJECTION SUBCUTANEOUS ONCE AS NEEDED
Status: DISCONTINUED | OUTPATIENT
Start: 2019-08-12 | End: 2019-08-12 | Stop reason: HOSPADM

## 2019-08-12 RX ORDER — EPINEPHRINE 0.3 MG/.3ML
0.3 INJECTION SUBCUTANEOUS ONCE AS NEEDED
Status: CANCELLED | OUTPATIENT
Start: 2019-08-12

## 2019-08-12 RX ORDER — OMEPRAZOLE 40 MG/1
40 CAPSULE, DELAYED RELEASE ORAL DAILY
Qty: 60 CAPSULE | Refills: 11 | Status: SHIPPED | OUTPATIENT
Start: 2019-08-12 | End: 2020-08-24 | Stop reason: SDUPTHER

## 2019-08-12 RX ORDER — DIPHENHYDRAMINE HYDROCHLORIDE 50 MG/ML
50 INJECTION INTRAMUSCULAR; INTRAVENOUS ONCE AS NEEDED
Status: CANCELLED | OUTPATIENT
Start: 2019-08-12

## 2019-08-12 RX ORDER — SODIUM CHLORIDE 0.9 % (FLUSH) 0.9 %
10 SYRINGE (ML) INJECTION
Status: CANCELLED | OUTPATIENT
Start: 2019-08-14

## 2019-08-12 RX ORDER — FLUOROURACIL 50 MG/ML
400 INJECTION, SOLUTION INTRAVENOUS
Status: CANCELLED | OUTPATIENT
Start: 2019-08-12

## 2019-08-12 RX ORDER — SODIUM CHLORIDE 0.9 % (FLUSH) 0.9 %
10 SYRINGE (ML) INJECTION
Status: DISCONTINUED | OUTPATIENT
Start: 2019-08-12 | End: 2019-08-12 | Stop reason: HOSPADM

## 2019-08-12 RX ORDER — DIPHENHYDRAMINE HYDROCHLORIDE 50 MG/ML
50 INJECTION INTRAMUSCULAR; INTRAVENOUS ONCE AS NEEDED
Status: DISCONTINUED | OUTPATIENT
Start: 2019-08-12 | End: 2019-08-12 | Stop reason: HOSPADM

## 2019-08-12 RX ORDER — HEPARIN 100 UNIT/ML
500 SYRINGE INTRAVENOUS
Status: CANCELLED | OUTPATIENT
Start: 2019-08-14

## 2019-08-12 RX ORDER — GABAPENTIN 300 MG/1
300 CAPSULE ORAL NIGHTLY
Qty: 90 CAPSULE | Refills: 2 | Status: SHIPPED | OUTPATIENT
Start: 2019-08-12 | End: 2019-10-07 | Stop reason: SDUPTHER

## 2019-08-12 RX ORDER — FLUOROURACIL 50 MG/ML
400 INJECTION, SOLUTION INTRAVENOUS
Status: COMPLETED | OUTPATIENT
Start: 2019-08-12 | End: 2019-08-12

## 2019-08-12 RX ORDER — SODIUM CHLORIDE 0.9 % (FLUSH) 0.9 %
10 SYRINGE (ML) INJECTION
Status: CANCELLED | OUTPATIENT
Start: 2019-08-12

## 2019-08-12 RX ORDER — HEPARIN 100 UNIT/ML
500 SYRINGE INTRAVENOUS
Status: CANCELLED | OUTPATIENT
Start: 2019-08-12

## 2019-08-12 RX ORDER — HEPARIN 100 UNIT/ML
500 SYRINGE INTRAVENOUS
Status: DISCONTINUED | OUTPATIENT
Start: 2019-08-12 | End: 2019-08-12 | Stop reason: HOSPADM

## 2019-08-12 RX ADMIN — DEXTROSE: 50 INJECTION, SOLUTION INTRAVENOUS at 09:08

## 2019-08-12 RX ADMIN — FLUOROURACIL 4535 MG: 50 INJECTION, SOLUTION INTRAVENOUS at 12:08

## 2019-08-12 RX ADMIN — FLUOROURACIL 755 MG: 50 INJECTION, SOLUTION INTRAVENOUS at 11:08

## 2019-08-12 RX ADMIN — LEUCOVORIN CALCIUM 755 MG: 350 INJECTION, POWDER, LYOPHILIZED, FOR SOLUTION INTRAMUSCULAR; INTRAVENOUS at 09:08

## 2019-08-12 RX ADMIN — DEXAMETHASONE SODIUM PHOSPHATE: 4 INJECTION, SOLUTION INTRA-ARTICULAR; INTRALESIONAL; INTRAMUSCULAR; INTRAVENOUS; SOFT TISSUE at 09:08

## 2019-08-12 RX ADMIN — OXALIPLATIN 161 MG: 5 INJECTION, SOLUTION, CONCENTRATE INTRAVENOUS at 09:08

## 2019-08-12 NOTE — PLAN OF CARE
Problem: Adult Inpatient Plan of Care  Goal: Plan of Care Review  Outcome: Ongoing (interventions implemented as appropriate)  Pt here for Folfox infusion D1C10, labs, hx, meds, allergies reviewed, pt with no complaints at this time, just feel weak and tired, reclined in chair, warm blanket provided, continue to monitor

## 2019-08-12 NOTE — PROGRESS NOTES
Subjective:       Patient ID: Irene Galo is a 53 y.o. female.    Chief Complaint: Malignant neoplasm of sigmoid colon; Shortness of Breath; interm chest pain 5/10; Gastroesophageal Reflux; Peripheral Neuropathy; and no N/V/D  ONCOLOGIC HISTORY: Ms. Irene Galo is a 53-year-old female who underwent screening colonoscopy and was noted to have a circumferential partially obstructing mass in the rectosigmoid, which could not be traversed with the scope.  Inferior edge was 18 to 19 cm from the anal verge.  This was done on 01/10/2019.  Pathology from that revealed moderate dysplasia.  Malignancy could not be excluded.  She underwent a CT scan on 01/15/2019 that revealed a focal area of thickening in the rectosigmoid colon measuring 1.9 cm in length, which could possibly correspond to the abnormality seen on recent colonoscopy.  There is some  slight prominent lymph node superior to this region of thickening along the course of the inferior mesenteric vessels measuring approximately 6.5 mm.  CEA was 3.3.  She underwent sigmoid colectomy on 02/15/2019.  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                  HPI She comes in to receive cycle 9 of adjuvant chemo with FOLFOX  She notes intermittent chest pain, come on while sitting and lying down, worse when she wakes up in AM and when she goes to bed. Mild Shortness of breath. Not worse when pump is attached. Lasts for a while once it comes on. Relieved with walking, and changing position and advil. She notes symptoms got worse since stoppoing prilosec  She notes tingling and numbness in her hands and feet which is currently not interfering with ADL's but she is being careful. ECOG PS is zero.    Review of Systems   Constitutional: Negative for appetite change and unexpected weight change.   Eyes: Positive for visual disturbance.   Respiratory: Positive for shortness of breath. Negative for cough.    Cardiovascular: Positive for chest pain.    Gastrointestinal: Negative for abdominal pain and diarrhea.   Genitourinary: Negative for frequency.   Musculoskeletal: Positive for back pain.   Skin: Negative for rash.   Neurological: Negative for headaches.   Hematological: Negative for adenopathy.   Psychiatric/Behavioral: The patient is not nervous/anxious.        PMFSH: all information reviewed and updated as relevant to today's visit  Objective:      Physical Exam   Constitutional: She is oriented to person, place, and time. She appears well-developed and well-nourished.   HENT:   Mouth/Throat: No oropharyngeal exudate.   Cardiovascular: Normal rate and normal heart sounds.   Pulmonary/Chest: Effort normal and breath sounds normal. She has no wheezes.   Abdominal: Soft. Bowel sounds are normal. There is no tenderness.   Musculoskeletal: She exhibits no edema or tenderness.   Lymphadenopathy:     She has no cervical adenopathy.   Neurological: She is alert and oriented to person, place, and time. Coordination normal.   Skin: Skin is warm and dry. No rash noted.   Psychiatric: She has a normal mood and affect. Judgment and thought content normal.   Vitals reviewed.        LABS:  WBC   Date Value Ref Range Status   08/12/2019 10.41 3.90 - 12.70 K/uL Final     Hemoglobin   Date Value Ref Range Status   08/12/2019 12.3 12.0 - 16.0 g/dL Final     Hematocrit   Date Value Ref Range Status   08/12/2019 39.2 37.0 - 48.5 % Final     Platelets   Date Value Ref Range Status   08/12/2019 95 (L) 150 - 350 K/uL Final     Gran # (ANC)   Date Value Ref Range Status   08/12/2019 6.4 1.8 - 7.7 K/uL Final     Comment:     The ANC is based on a white cell differential from an   automated cell counter. It has not been microscopically   reviewed for the presence of abnormal cells. Clinical   correlation is required.         Chemistry        Component Value Date/Time     08/12/2019 0703    K 4.0 08/12/2019 0703     08/12/2019 0703    CO2 26 08/12/2019 0703    BUN 7  08/12/2019 0703    CREATININE 0.8 08/12/2019 0703    GLU 89 08/12/2019 0703        Component Value Date/Time    CALCIUM 9.6 08/12/2019 0703    ALKPHOS 130 08/12/2019 0703    AST 32 08/12/2019 0703    ALT 24 08/12/2019 0703    BILITOT 0.5 08/12/2019 0703    ESTGFRAFRICA >60.0 08/12/2019 0703    EGFRNONAA >60.0 08/12/2019 0703          Assessment:       1. Malignant neoplasm of sigmoid colon    2. Secondary adenocarcinoma of lymph node    3. Chemotherapy-induced thrombocytopenia    4. Chemotherapy induced neutropenia    5. Gastroesophageal reflux disease, esophagitis presence not specified    6. Chemotherapy-induced neuropathy        Plan:        1,2,3,4. Proceed with cycle 10 of FOLFOX and Neulasta on day4 . Platelts low but adequate  5. Added Prilosec and TUMS  6. Started Gapapentin. If worsens will reduce Oxaliplatin to 75 mg/m2.    Above care plan was discussed with patient  and all questions were addressed to her satisfaction

## 2019-08-12 NOTE — PLAN OF CARE
Problem: Adult Inpatient Plan of Care  Goal: Plan of Care Review  Outcome: Ongoing (interventions implemented as appropriate)  Pt tolerated folfox infusion without issue, cadd pump infusing to left chest wall port at 2.2 ml/hr without issue prior to d/c, pt to rt Wednesday aroud 1000 for pump d/c, pt aware, no distress noted upon d/c to home

## 2019-08-14 ENCOUNTER — INFUSION (OUTPATIENT)
Dept: INFUSION THERAPY | Facility: HOSPITAL | Age: 54
End: 2019-08-14
Attending: INTERNAL MEDICINE
Payer: COMMERCIAL

## 2019-08-14 VITALS
HEART RATE: 97 BPM | RESPIRATION RATE: 18 BRPM | TEMPERATURE: 99 F | SYSTOLIC BLOOD PRESSURE: 138 MMHG | DIASTOLIC BLOOD PRESSURE: 94 MMHG

## 2019-08-14 DIAGNOSIS — C18.7 MALIGNANT NEOPLASM OF SIGMOID COLON: Primary | ICD-10-CM

## 2019-08-14 PROCEDURE — A4216 STERILE WATER/SALINE, 10 ML: HCPCS | Performed by: INTERNAL MEDICINE

## 2019-08-14 PROCEDURE — 96377 APPLICATON ON-BODY INJECTOR: CPT

## 2019-08-14 PROCEDURE — 96523 IRRIG DRUG DELIVERY DEVICE: CPT

## 2019-08-14 PROCEDURE — 25000003 PHARM REV CODE 250: Performed by: INTERNAL MEDICINE

## 2019-08-14 PROCEDURE — 63600175 PHARM REV CODE 636 W HCPCS: Performed by: INTERNAL MEDICINE

## 2019-08-14 RX ORDER — SODIUM CHLORIDE 0.9 % (FLUSH) 0.9 %
10 SYRINGE (ML) INJECTION
Status: DISCONTINUED | OUTPATIENT
Start: 2019-08-14 | End: 2019-08-14 | Stop reason: HOSPADM

## 2019-08-14 RX ORDER — HEPARIN 100 UNIT/ML
500 SYRINGE INTRAVENOUS
Status: DISCONTINUED | OUTPATIENT
Start: 2019-08-14 | End: 2019-08-14 | Stop reason: HOSPADM

## 2019-08-14 RX ADMIN — Medication 10 ML: at 10:08

## 2019-08-14 RX ADMIN — PEGFILGRASTIM 6 MG: KIT SUBCUTANEOUS at 10:08

## 2019-08-14 RX ADMIN — HEPARIN 500 UNITS: 100 SYRINGE at 10:08

## 2019-08-14 NOTE — NURSING
Patient arrived for pump dc. Tolerated well. Port + blood return present, flushed, hep locked and deaccessed. OBI placed on right upper arm. NAD noted upon discharge. Discharged home, ambulated independently.

## 2019-08-27 ENCOUNTER — OFFICE VISIT (OUTPATIENT)
Dept: HEMATOLOGY/ONCOLOGY | Facility: CLINIC | Age: 54
End: 2019-08-27
Payer: COMMERCIAL

## 2019-08-27 ENCOUNTER — LAB VISIT (OUTPATIENT)
Dept: LAB | Facility: HOSPITAL | Age: 54
End: 2019-08-27
Attending: INTERNAL MEDICINE
Payer: COMMERCIAL

## 2019-08-27 ENCOUNTER — INFUSION (OUTPATIENT)
Dept: INFUSION THERAPY | Facility: HOSPITAL | Age: 54
End: 2019-08-27
Attending: INTERNAL MEDICINE
Payer: COMMERCIAL

## 2019-08-27 VITALS
RESPIRATION RATE: 16 BRPM | HEIGHT: 62 IN | OXYGEN SATURATION: 96 % | BODY MASS INDEX: 32.22 KG/M2 | DIASTOLIC BLOOD PRESSURE: 88 MMHG | SYSTOLIC BLOOD PRESSURE: 138 MMHG | TEMPERATURE: 98 F | WEIGHT: 175.06 LBS | HEART RATE: 84 BPM

## 2019-08-27 VITALS
SYSTOLIC BLOOD PRESSURE: 140 MMHG | BODY MASS INDEX: 32.22 KG/M2 | HEIGHT: 62 IN | WEIGHT: 175.06 LBS | DIASTOLIC BLOOD PRESSURE: 88 MMHG | HEART RATE: 91 BPM | RESPIRATION RATE: 18 BRPM | TEMPERATURE: 98 F

## 2019-08-27 DIAGNOSIS — C18.7 MALIGNANT NEOPLASM OF SIGMOID COLON: Primary | ICD-10-CM

## 2019-08-27 DIAGNOSIS — D70.1 CHEMOTHERAPY INDUCED NEUTROPENIA: ICD-10-CM

## 2019-08-27 DIAGNOSIS — C18.7 MALIGNANT NEOPLASM OF SIGMOID COLON: ICD-10-CM

## 2019-08-27 DIAGNOSIS — C77.9 SECONDARY ADENOCARCINOMA OF LYMPH NODE: ICD-10-CM

## 2019-08-27 DIAGNOSIS — D69.59 CHEMOTHERAPY-INDUCED THROMBOCYTOPENIA: ICD-10-CM

## 2019-08-27 DIAGNOSIS — T45.1X5A CHEMOTHERAPY-INDUCED THROMBOCYTOPENIA: ICD-10-CM

## 2019-08-27 DIAGNOSIS — T45.1X5A CHEMOTHERAPY INDUCED NEUTROPENIA: ICD-10-CM

## 2019-08-27 LAB
ALBUMIN SERPL BCP-MCNC: 3.4 G/DL (ref 3.5–5.2)
ALP SERPL-CCNC: 121 U/L (ref 55–135)
ALT SERPL W/O P-5'-P-CCNC: 24 U/L (ref 10–44)
ANION GAP SERPL CALC-SCNC: 7 MMOL/L (ref 8–16)
AST SERPL-CCNC: 29 U/L (ref 10–40)
BILIRUB SERPL-MCNC: 0.4 MG/DL (ref 0.1–1)
BUN SERPL-MCNC: 9 MG/DL (ref 6–20)
CALCIUM SERPL-MCNC: 9.1 MG/DL (ref 8.7–10.5)
CHLORIDE SERPL-SCNC: 109 MMOL/L (ref 95–110)
CO2 SERPL-SCNC: 26 MMOL/L (ref 23–29)
CREAT SERPL-MCNC: 0.8 MG/DL (ref 0.5–1.4)
ERYTHROCYTE [DISTWIDTH] IN BLOOD BY AUTOMATED COUNT: 18.6 % (ref 11.5–14.5)
EST. GFR  (AFRICAN AMERICAN): >60 ML/MIN/1.73 M^2
EST. GFR  (NON AFRICAN AMERICAN): >60 ML/MIN/1.73 M^2
GLUCOSE SERPL-MCNC: 106 MG/DL (ref 70–110)
HCT VFR BLD AUTO: 37.3 % (ref 37–48.5)
HGB BLD-MCNC: 11.6 G/DL (ref 12–16)
IMM GRANULOCYTES # BLD AUTO: 0.2 K/UL (ref 0–0.04)
MCH RBC QN AUTO: 30.9 PG (ref 27–31)
MCHC RBC AUTO-ENTMCNC: 31.1 G/DL (ref 32–36)
MCV RBC AUTO: 100 FL (ref 82–98)
NEUTROPHILS # BLD AUTO: 6.2 K/UL (ref 1.8–7.7)
PLATELET # BLD AUTO: 85 K/UL (ref 150–350)
PMV BLD AUTO: 12.2 FL (ref 9.2–12.9)
POTASSIUM SERPL-SCNC: 4 MMOL/L (ref 3.5–5.1)
PROT SERPL-MCNC: 6.8 G/DL (ref 6–8.4)
RBC # BLD AUTO: 3.75 M/UL (ref 4–5.4)
SODIUM SERPL-SCNC: 142 MMOL/L (ref 136–145)
WBC # BLD AUTO: 9.12 K/UL (ref 3.9–12.7)

## 2019-08-27 PROCEDURE — 96413 CHEMO IV INFUSION 1 HR: CPT

## 2019-08-27 PROCEDURE — 99215 PR OFFICE/OUTPT VISIT, EST, LEVL V, 40-54 MIN: ICD-10-PCS | Mod: S$GLB,,, | Performed by: INTERNAL MEDICINE

## 2019-08-27 PROCEDURE — 80053 COMPREHEN METABOLIC PANEL: CPT

## 2019-08-27 PROCEDURE — 96411 CHEMO IV PUSH ADDL DRUG: CPT

## 2019-08-27 PROCEDURE — 96416 CHEMO PROLONG INFUSE W/PUMP: CPT

## 2019-08-27 PROCEDURE — 99215 OFFICE O/P EST HI 40 MIN: CPT | Mod: S$GLB,,, | Performed by: INTERNAL MEDICINE

## 2019-08-27 PROCEDURE — 63600175 PHARM REV CODE 636 W HCPCS: Performed by: INTERNAL MEDICINE

## 2019-08-27 PROCEDURE — 36415 COLL VENOUS BLD VENIPUNCTURE: CPT

## 2019-08-27 PROCEDURE — 85027 COMPLETE CBC AUTOMATED: CPT

## 2019-08-27 PROCEDURE — 3008F PR BODY MASS INDEX (BMI) DOCUMENTED: ICD-10-PCS | Mod: CPTII,S$GLB,, | Performed by: INTERNAL MEDICINE

## 2019-08-27 PROCEDURE — 96368 THER/DIAG CONCURRENT INF: CPT

## 2019-08-27 PROCEDURE — 3008F BODY MASS INDEX DOCD: CPT | Mod: CPTII,S$GLB,, | Performed by: INTERNAL MEDICINE

## 2019-08-27 PROCEDURE — 96367 TX/PROPH/DG ADDL SEQ IV INF: CPT

## 2019-08-27 PROCEDURE — 99999 PR PBB SHADOW E&M-EST. PATIENT-LVL IV: ICD-10-PCS | Mod: PBBFAC,,, | Performed by: INTERNAL MEDICINE

## 2019-08-27 PROCEDURE — 99999 PR PBB SHADOW E&M-EST. PATIENT-LVL IV: CPT | Mod: PBBFAC,,, | Performed by: INTERNAL MEDICINE

## 2019-08-27 RX ORDER — FLUOROURACIL 50 MG/ML
400 INJECTION, SOLUTION INTRAVENOUS
Status: CANCELLED | OUTPATIENT
Start: 2019-08-27

## 2019-08-27 RX ORDER — HEPARIN 100 UNIT/ML
500 SYRINGE INTRAVENOUS
Status: CANCELLED | OUTPATIENT
Start: 2019-08-27

## 2019-08-27 RX ORDER — EPINEPHRINE 0.3 MG/.3ML
0.3 INJECTION SUBCUTANEOUS ONCE AS NEEDED
Status: DISCONTINUED | OUTPATIENT
Start: 2019-08-27 | End: 2019-08-27 | Stop reason: HOSPADM

## 2019-08-27 RX ORDER — SODIUM CHLORIDE 0.9 % (FLUSH) 0.9 %
10 SYRINGE (ML) INJECTION
Status: CANCELLED | OUTPATIENT
Start: 2019-08-27

## 2019-08-27 RX ORDER — SODIUM CHLORIDE 0.9 % (FLUSH) 0.9 %
10 SYRINGE (ML) INJECTION
Status: DISCONTINUED | OUTPATIENT
Start: 2019-08-27 | End: 2019-08-27 | Stop reason: HOSPADM

## 2019-08-27 RX ORDER — FLUOROURACIL 50 MG/ML
400 INJECTION, SOLUTION INTRAVENOUS
Status: COMPLETED | OUTPATIENT
Start: 2019-08-27 | End: 2019-08-27

## 2019-08-27 RX ORDER — HEPARIN 100 UNIT/ML
500 SYRINGE INTRAVENOUS
Status: DISCONTINUED | OUTPATIENT
Start: 2019-08-27 | End: 2019-08-27 | Stop reason: HOSPADM

## 2019-08-27 RX ORDER — EPINEPHRINE 0.3 MG/.3ML
0.3 INJECTION SUBCUTANEOUS ONCE AS NEEDED
Status: CANCELLED | OUTPATIENT
Start: 2019-08-27

## 2019-08-27 RX ORDER — SODIUM CHLORIDE 0.9 % (FLUSH) 0.9 %
10 SYRINGE (ML) INJECTION
Status: CANCELLED | OUTPATIENT
Start: 2019-08-28

## 2019-08-27 RX ORDER — DIPHENHYDRAMINE HYDROCHLORIDE 50 MG/ML
50 INJECTION INTRAMUSCULAR; INTRAVENOUS ONCE AS NEEDED
Status: CANCELLED | OUTPATIENT
Start: 2019-08-27

## 2019-08-27 RX ORDER — DIPHENHYDRAMINE HYDROCHLORIDE 50 MG/ML
50 INJECTION INTRAMUSCULAR; INTRAVENOUS ONCE AS NEEDED
Status: DISCONTINUED | OUTPATIENT
Start: 2019-08-27 | End: 2019-08-27 | Stop reason: HOSPADM

## 2019-08-27 RX ORDER — HEPARIN 100 UNIT/ML
500 SYRINGE INTRAVENOUS
Status: CANCELLED | OUTPATIENT
Start: 2019-08-28

## 2019-08-27 RX ADMIN — FLUOROURACIL 755 MG: 50 INJECTION, SOLUTION INTRAVENOUS at 12:08

## 2019-08-27 RX ADMIN — DEXTROSE: 50 INJECTION, SOLUTION INTRAVENOUS at 08:08

## 2019-08-27 RX ADMIN — FLUOROURACIL 4535 MG: 50 INJECTION, SOLUTION INTRAVENOUS at 12:08

## 2019-08-27 RX ADMIN — LEUCOVORIN CALCIUM 755 MG: 350 INJECTION, POWDER, LYOPHILIZED, FOR SOLUTION INTRAMUSCULAR; INTRAVENOUS at 09:08

## 2019-08-27 RX ADMIN — OXALIPLATIN 142 MG: 5 INJECTION, SOLUTION, CONCENTRATE INTRAVENOUS at 09:08

## 2019-08-27 RX ADMIN — DEXAMETHASONE SODIUM PHOSPHATE: 4 INJECTION, SOLUTION INTRA-ARTICULAR; INTRALESIONAL; INTRAMUSCULAR; INTRAVENOUS; SOFT TISSUE at 09:08

## 2019-08-27 NOTE — PLAN OF CARE
Problem: Adult Inpatient Plan of Care  Goal: Patient-Specific Goal (Individualization)  Outcome: Ongoing (interventions implemented as appropriate)  Patient's labs, history, allergies, and medication reviewed.  Seen by MD prior to visit.  Assessment complete.  Vital signs stable.  Patient to receive Folfox.  Dose reduced die to increased neuropathy per patient.  Discussed plan of care with patient.  Patient in agreement. Chair reclined.  Warm blanket and snack offered.  Will monitor.

## 2019-08-27 NOTE — PLAN OF CARE
Problem: Adult Inpatient Plan of Care  Goal: Plan of Care Review  Outcome: Ongoing (interventions implemented as appropriate)  Patient tolerated treatment well.  Vital signs stable.  Discharged without S/S of adverse effects.  AVS given.  Patient instructed to call provider with any questions or concerns.  CADD pump infusing.  All clamps open and connections secured. No kinks in tubing noted.  Settings double checked by second nurse.  Instructed patient to monitor pump to make sure it says RUN and that numbers on pump are going down.  Patient instructed to call number on side of pump with any issues regarding pump.  RTC on Thursday for pump d/c approx 1030.

## 2019-08-27 NOTE — PROGRESS NOTES
Subjective:       Patient ID: Irene Galo is a 53 y.o. female.    Chief Complaint: Malignant neoplasm of sigmoid colon and Peripheral Neuropathy  ONCOLOGIC HISTORY: Ms. Irene Galo is a 53-year-old female who underwent screening colonoscopy and was noted to have a circumferential partially obstructing mass in the rectosigmoid, which could not be traversed with the scope.  Inferior edge was 18 to 19 cm from the anal verge.  This was done on 01/10/2019.  Pathology from that revealed moderate dysplasia.  Malignancy could not be excluded.  She underwent a CT scan on 01/15/2019 that revealed a focal area of thickening in the rectosigmoid colon measuring 1.9 cm in length, which could possibly correspond to the abnormality seen on recent colonoscopy.  There is some  slight prominent lymph node superior to this region of thickening along the course of the inferior mesenteric vessels measuring approximately 6.5 mm.  CEA was 3.3.  She underwent sigmoid colectomy on 02/15/2019.  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               HPI She comes in to receive cycle 11 of adjuvant chemo with FOLFOX. Hs notes neuropathy is persistent but not interfering with ADL's    She denies any nausea, vomiting, diarrhea, constipation, abdominal pain, weight loss or loss of appetite, chest pain, shortness of breath, leg swelling, fatigue, pain, headache, dizziness, or mood changes. Her ECOG PS is zero. She is accompanied by her         Review of Systems   Constitutional: Negative for appetite change and unexpected weight change.   Eyes: Positive for visual disturbance.   Respiratory: Negative for cough and shortness of breath.    Cardiovascular: Negative for chest pain.   Gastrointestinal: Negative for abdominal pain and diarrhea.   Genitourinary: Negative for frequency.   Musculoskeletal: Negative for back pain.   Skin: Negative for rash.   Neurological: Positive for numbness. Negative for headaches.    Hematological: Negative for adenopathy.   Psychiatric/Behavioral: The patient is not nervous/anxious.        Objective:      Physical Exam   Constitutional: She is oriented to person, place, and time. She appears well-developed and well-nourished.   HENT:   Mouth/Throat: No oropharyngeal exudate.   Cardiovascular: Normal rate and normal heart sounds.   Pulmonary/Chest: Effort normal and breath sounds normal. She has no wheezes.   Abdominal: Soft. Bowel sounds are normal. There is no tenderness.   Musculoskeletal: She exhibits no edema or tenderness.   Lymphadenopathy:     She has no cervical adenopathy.   Neurological: She is alert and oriented to person, place, and time. Coordination normal.   Skin: Skin is warm and dry. No rash noted.   Psychiatric: She has a normal mood and affect. Judgment and thought content normal.   Vitals reviewed.      LABS:  WBC   Date Value Ref Range Status   08/27/2019 9.12 3.90 - 12.70 K/uL Final     Hemoglobin   Date Value Ref Range Status   08/27/2019 11.6 (L) 12.0 - 16.0 g/dL Final     Hematocrit   Date Value Ref Range Status   08/27/2019 37.3 37.0 - 48.5 % Final     Platelets   Date Value Ref Range Status   08/27/2019 85 (L) 150 - 350 K/uL Final     Gran # (ANC)   Date Value Ref Range Status   08/27/2019 6.2 1.8 - 7.7 K/uL Final     Comment:     The ANC is based on a white cell differential from an   automated cell counter. It has not been microscopically   reviewed for the presence of abnormal cells. Clinical   correlation is required.         Chemistry        Component Value Date/Time     08/27/2019 0731    K 4.0 08/27/2019 0731     08/27/2019 0731    CO2 26 08/27/2019 0731    BUN 9 08/27/2019 0731    CREATININE 0.8 08/27/2019 0731     08/27/2019 0731        Component Value Date/Time    CALCIUM 9.1 08/27/2019 0731    ALKPHOS 121 08/27/2019 0731    AST 29 08/27/2019 0731    ALT 24 08/27/2019 0731    BILITOT 0.4 08/27/2019 0731    ESTGFRAFRICA >60.0 08/27/2019  0731    EGFRNONAA >60.0 08/27/2019 0731           Assessment:       1. Malignant neoplasm of sigmoid colon    2. Secondary adenocarcinoma of lymph node    3. Chemotherapy induced neutropenia    4. Chemotherapy-induced thrombocytopenia        Plan:        1,2,3,4. She is doing well and will proceed with FOLFOX, dose reduce Oxaliplatin to 75 mg/m2 due to grade 2 neuropathy.  She will return in 2 weeks for next cycle.    Platelets low but adequate to proceed with chemo.    Above care plan was discussed with patient and accompanying  and all questions were addressed to their satisfaction

## 2019-08-29 ENCOUNTER — INFUSION (OUTPATIENT)
Dept: INFUSION THERAPY | Facility: HOSPITAL | Age: 54
End: 2019-08-29
Attending: INTERNAL MEDICINE
Payer: COMMERCIAL

## 2019-08-29 VITALS
SYSTOLIC BLOOD PRESSURE: 138 MMHG | DIASTOLIC BLOOD PRESSURE: 80 MMHG | TEMPERATURE: 98 F | OXYGEN SATURATION: 96 % | RESPIRATION RATE: 18 BRPM | HEART RATE: 88 BPM

## 2019-08-29 DIAGNOSIS — C18.7 MALIGNANT NEOPLASM OF SIGMOID COLON: Primary | ICD-10-CM

## 2019-08-29 PROCEDURE — 25000003 PHARM REV CODE 250: Performed by: INTERNAL MEDICINE

## 2019-08-29 PROCEDURE — 96523 IRRIG DRUG DELIVERY DEVICE: CPT

## 2019-08-29 PROCEDURE — 63600175 PHARM REV CODE 636 W HCPCS: Performed by: INTERNAL MEDICINE

## 2019-08-29 PROCEDURE — 96377 APPLICATON ON-BODY INJECTOR: CPT

## 2019-08-29 PROCEDURE — A4216 STERILE WATER/SALINE, 10 ML: HCPCS | Performed by: INTERNAL MEDICINE

## 2019-08-29 RX ORDER — HEPARIN 100 UNIT/ML
500 SYRINGE INTRAVENOUS
Status: DISCONTINUED | OUTPATIENT
Start: 2019-08-29 | End: 2019-08-29 | Stop reason: HOSPADM

## 2019-08-29 RX ORDER — SODIUM CHLORIDE 0.9 % (FLUSH) 0.9 %
10 SYRINGE (ML) INJECTION
Status: DISCONTINUED | OUTPATIENT
Start: 2019-08-29 | End: 2019-08-29 | Stop reason: HOSPADM

## 2019-08-29 RX ADMIN — HEPARIN SODIUM (PORCINE) LOCK FLUSH IV SOLN 100 UNIT/ML 500 UNITS: 100 SOLUTION at 10:08

## 2019-08-29 RX ADMIN — PEGFILGRASTIM 6 MG: KIT SUBCUTANEOUS at 10:08

## 2019-08-29 RX ADMIN — Medication 10 ML: at 10:08

## 2019-08-29 NOTE — PLAN OF CARE
Problem: Adult Inpatient Plan of Care  Goal: Plan of Care Review  Outcome: Ongoing (interventions implemented as appropriate)  Pt disconnected from CADD pump. Infusion completed with no complications. VSS. Pt instructed to call MD with any problems. Neulasta OBI in place with green flashing light. NAD. Pt discharged home independently.

## 2019-08-30 ENCOUNTER — PATIENT OUTREACH (OUTPATIENT)
Dept: ADMINISTRATIVE | Facility: HOSPITAL | Age: 54
End: 2019-08-30

## 2019-09-04 ENCOUNTER — PATIENT MESSAGE (OUTPATIENT)
Dept: HEMATOLOGY/ONCOLOGY | Facility: CLINIC | Age: 54
End: 2019-09-04

## 2019-09-04 ENCOUNTER — LAB VISIT (OUTPATIENT)
Dept: LAB | Facility: HOSPITAL | Age: 54
End: 2019-09-04
Attending: INTERNAL MEDICINE
Payer: COMMERCIAL

## 2019-09-04 DIAGNOSIS — R35.0 URINARY FREQUENCY: ICD-10-CM

## 2019-09-04 DIAGNOSIS — R35.0 URINARY FREQUENCY: Primary | ICD-10-CM

## 2019-09-04 LAB
BACTERIA #/AREA URNS HPF: ABNORMAL /HPF
BILIRUB UR QL STRIP: NEGATIVE
CLARITY UR: ABNORMAL
COLOR UR: YELLOW
GLUCOSE UR QL STRIP: NEGATIVE
HGB UR QL STRIP: ABNORMAL
HYALINE CASTS #/AREA URNS LPF: 0 /LPF
KETONES UR QL STRIP: ABNORMAL
LEUKOCYTE ESTERASE UR QL STRIP: ABNORMAL
MICROSCOPIC COMMENT: ABNORMAL
NITRITE UR QL STRIP: NEGATIVE
PH UR STRIP: 6 [PH] (ref 5–8)
PROT UR QL STRIP: ABNORMAL
RBC #/AREA URNS HPF: 40 /HPF (ref 0–4)
SP GR UR STRIP: >=1.03 (ref 1–1.03)
URN SPEC COLLECT METH UR: ABNORMAL
UROBILINOGEN UR STRIP-ACNC: NEGATIVE EU/DL
WBC #/AREA URNS HPF: >100 /HPF (ref 0–5)
WBC CLUMPS URNS QL MICRO: ABNORMAL

## 2019-09-04 PROCEDURE — 87186 SC STD MICRODIL/AGAR DIL: CPT

## 2019-09-04 PROCEDURE — 87086 URINE CULTURE/COLONY COUNT: CPT

## 2019-09-04 PROCEDURE — 87088 URINE BACTERIA CULTURE: CPT

## 2019-09-04 PROCEDURE — 87077 CULTURE AEROBIC IDENTIFY: CPT

## 2019-09-04 PROCEDURE — 81000 URINALYSIS NONAUTO W/SCOPE: CPT

## 2019-09-05 ENCOUNTER — PATIENT MESSAGE (OUTPATIENT)
Dept: HEMATOLOGY/ONCOLOGY | Facility: CLINIC | Age: 54
End: 2019-09-05

## 2019-09-05 DIAGNOSIS — N30.00 ACUTE CYSTITIS WITHOUT HEMATURIA: Primary | ICD-10-CM

## 2019-09-05 RX ORDER — CIPROFLOXACIN 500 MG/1
500 TABLET ORAL 2 TIMES DAILY
Qty: 10 TABLET | Refills: 0 | Status: SHIPPED | OUTPATIENT
Start: 2019-09-05 | End: 2019-09-10

## 2019-09-05 NOTE — TELEPHONE ENCOUNTER
No dysuria. Yes, to pressure and pain with urination and frequency. No fever/chills/lower back pain.  ~stephenie

## 2019-09-07 LAB — BACTERIA UR CULT: ABNORMAL

## 2019-09-09 ENCOUNTER — INFUSION (OUTPATIENT)
Dept: INFUSION THERAPY | Facility: HOSPITAL | Age: 54
End: 2019-09-09
Attending: INTERNAL MEDICINE
Payer: COMMERCIAL

## 2019-09-09 ENCOUNTER — LAB VISIT (OUTPATIENT)
Dept: LAB | Facility: HOSPITAL | Age: 54
End: 2019-09-09
Attending: INTERNAL MEDICINE
Payer: COMMERCIAL

## 2019-09-09 ENCOUNTER — OFFICE VISIT (OUTPATIENT)
Dept: HEMATOLOGY/ONCOLOGY | Facility: CLINIC | Age: 54
End: 2019-09-09
Payer: COMMERCIAL

## 2019-09-09 VITALS
RESPIRATION RATE: 18 BRPM | SYSTOLIC BLOOD PRESSURE: 147 MMHG | HEIGHT: 62 IN | DIASTOLIC BLOOD PRESSURE: 91 MMHG | BODY MASS INDEX: 31.93 KG/M2 | TEMPERATURE: 98 F | WEIGHT: 173.5 LBS | OXYGEN SATURATION: 96 % | HEART RATE: 89 BPM

## 2019-09-09 VITALS
SYSTOLIC BLOOD PRESSURE: 129 MMHG | DIASTOLIC BLOOD PRESSURE: 80 MMHG | TEMPERATURE: 98 F | HEART RATE: 83 BPM | RESPIRATION RATE: 18 BRPM

## 2019-09-09 DIAGNOSIS — C77.9 SECONDARY ADENOCARCINOMA OF LYMPH NODE: ICD-10-CM

## 2019-09-09 DIAGNOSIS — C18.7 MALIGNANT NEOPLASM OF SIGMOID COLON: ICD-10-CM

## 2019-09-09 DIAGNOSIS — C18.7 MALIGNANT NEOPLASM OF SIGMOID COLON: Primary | ICD-10-CM

## 2019-09-09 LAB
ALBUMIN SERPL BCP-MCNC: 3.6 G/DL (ref 3.5–5.2)
ALP SERPL-CCNC: 118 U/L (ref 55–135)
ALT SERPL W/O P-5'-P-CCNC: 18 U/L (ref 10–44)
ANION GAP SERPL CALC-SCNC: 9 MMOL/L (ref 8–16)
AST SERPL-CCNC: 24 U/L (ref 10–40)
BILIRUB SERPL-MCNC: 0.5 MG/DL (ref 0.1–1)
BUN SERPL-MCNC: 7 MG/DL (ref 6–20)
CALCIUM SERPL-MCNC: 9.4 MG/DL (ref 8.7–10.5)
CHLORIDE SERPL-SCNC: 108 MMOL/L (ref 95–110)
CO2 SERPL-SCNC: 25 MMOL/L (ref 23–29)
CREAT SERPL-MCNC: 0.8 MG/DL (ref 0.5–1.4)
ERYTHROCYTE [DISTWIDTH] IN BLOOD BY AUTOMATED COUNT: 18.1 % (ref 11.5–14.5)
EST. GFR  (AFRICAN AMERICAN): >60 ML/MIN/1.73 M^2
EST. GFR  (NON AFRICAN AMERICAN): >60 ML/MIN/1.73 M^2
GLUCOSE SERPL-MCNC: 115 MG/DL (ref 70–110)
HCT VFR BLD AUTO: 38.7 % (ref 37–48.5)
HGB BLD-MCNC: 11.9 G/DL (ref 12–16)
IMM GRANULOCYTES # BLD AUTO: 0.14 K/UL (ref 0–0.04)
MCH RBC QN AUTO: 30.8 PG (ref 27–31)
MCHC RBC AUTO-ENTMCNC: 30.7 G/DL (ref 32–36)
MCV RBC AUTO: 100 FL (ref 82–98)
NEUTROPHILS # BLD AUTO: 4.4 K/UL (ref 1.8–7.7)
PLATELET # BLD AUTO: 86 K/UL (ref 150–350)
PMV BLD AUTO: 11.4 FL (ref 9.2–12.9)
POTASSIUM SERPL-SCNC: 3.9 MMOL/L (ref 3.5–5.1)
PROT SERPL-MCNC: 7.5 G/DL (ref 6–8.4)
RBC # BLD AUTO: 3.86 M/UL (ref 4–5.4)
SODIUM SERPL-SCNC: 142 MMOL/L (ref 136–145)
WBC # BLD AUTO: 6.95 K/UL (ref 3.9–12.7)

## 2019-09-09 PROCEDURE — 99215 PR OFFICE/OUTPT VISIT, EST, LEVL V, 40-54 MIN: ICD-10-PCS | Mod: S$GLB,,, | Performed by: INTERNAL MEDICINE

## 2019-09-09 PROCEDURE — 99999 PR PBB SHADOW E&M-EST. PATIENT-LVL IV: CPT | Mod: PBBFAC,,, | Performed by: INTERNAL MEDICINE

## 2019-09-09 PROCEDURE — 85027 COMPLETE CBC AUTOMATED: CPT

## 2019-09-09 PROCEDURE — 96416 CHEMO PROLONG INFUSE W/PUMP: CPT

## 2019-09-09 PROCEDURE — 63600175 PHARM REV CODE 636 W HCPCS: Performed by: INTERNAL MEDICINE

## 2019-09-09 PROCEDURE — 3008F PR BODY MASS INDEX (BMI) DOCUMENTED: ICD-10-PCS | Mod: CPTII,S$GLB,, | Performed by: INTERNAL MEDICINE

## 2019-09-09 PROCEDURE — 96415 CHEMO IV INFUSION ADDL HR: CPT

## 2019-09-09 PROCEDURE — 3008F BODY MASS INDEX DOCD: CPT | Mod: CPTII,S$GLB,, | Performed by: INTERNAL MEDICINE

## 2019-09-09 PROCEDURE — 36415 COLL VENOUS BLD VENIPUNCTURE: CPT

## 2019-09-09 PROCEDURE — 99215 OFFICE O/P EST HI 40 MIN: CPT | Mod: S$GLB,,, | Performed by: INTERNAL MEDICINE

## 2019-09-09 PROCEDURE — 80053 COMPREHEN METABOLIC PANEL: CPT

## 2019-09-09 PROCEDURE — 96368 THER/DIAG CONCURRENT INF: CPT

## 2019-09-09 PROCEDURE — 96411 CHEMO IV PUSH ADDL DRUG: CPT

## 2019-09-09 PROCEDURE — 96413 CHEMO IV INFUSION 1 HR: CPT

## 2019-09-09 PROCEDURE — 99999 PR PBB SHADOW E&M-EST. PATIENT-LVL IV: ICD-10-PCS | Mod: PBBFAC,,, | Performed by: INTERNAL MEDICINE

## 2019-09-09 PROCEDURE — 96367 TX/PROPH/DG ADDL SEQ IV INF: CPT

## 2019-09-09 RX ORDER — HEPARIN 100 UNIT/ML
500 SYRINGE INTRAVENOUS
Status: CANCELLED | OUTPATIENT
Start: 2019-09-11

## 2019-09-09 RX ORDER — FLUOROURACIL 50 MG/ML
400 INJECTION, SOLUTION INTRAVENOUS
Status: CANCELLED | OUTPATIENT
Start: 2019-09-09

## 2019-09-09 RX ORDER — SODIUM CHLORIDE 0.9 % (FLUSH) 0.9 %
10 SYRINGE (ML) INJECTION
Status: CANCELLED | OUTPATIENT
Start: 2019-09-11

## 2019-09-09 RX ORDER — DIPHENHYDRAMINE HYDROCHLORIDE 50 MG/ML
50 INJECTION INTRAMUSCULAR; INTRAVENOUS ONCE AS NEEDED
Status: DISCONTINUED | OUTPATIENT
Start: 2019-09-09 | End: 2019-09-09 | Stop reason: HOSPADM

## 2019-09-09 RX ORDER — HEPARIN 100 UNIT/ML
500 SYRINGE INTRAVENOUS
Status: CANCELLED | OUTPATIENT
Start: 2019-09-09

## 2019-09-09 RX ORDER — EPINEPHRINE 0.3 MG/.3ML
0.3 INJECTION SUBCUTANEOUS ONCE AS NEEDED
Status: DISCONTINUED | OUTPATIENT
Start: 2019-09-09 | End: 2019-09-09 | Stop reason: HOSPADM

## 2019-09-09 RX ORDER — DIPHENHYDRAMINE HYDROCHLORIDE 50 MG/ML
50 INJECTION INTRAMUSCULAR; INTRAVENOUS ONCE AS NEEDED
Status: CANCELLED | OUTPATIENT
Start: 2019-09-09

## 2019-09-09 RX ORDER — SODIUM CHLORIDE 0.9 % (FLUSH) 0.9 %
10 SYRINGE (ML) INJECTION
Status: DISCONTINUED | OUTPATIENT
Start: 2019-09-09 | End: 2019-09-09 | Stop reason: HOSPADM

## 2019-09-09 RX ORDER — HEPARIN 100 UNIT/ML
500 SYRINGE INTRAVENOUS
Status: DISCONTINUED | OUTPATIENT
Start: 2019-09-09 | End: 2019-09-09 | Stop reason: HOSPADM

## 2019-09-09 RX ORDER — EPINEPHRINE 0.3 MG/.3ML
0.3 INJECTION SUBCUTANEOUS ONCE AS NEEDED
Status: CANCELLED | OUTPATIENT
Start: 2019-09-09

## 2019-09-09 RX ORDER — SODIUM CHLORIDE 0.9 % (FLUSH) 0.9 %
10 SYRINGE (ML) INJECTION
Status: CANCELLED | OUTPATIENT
Start: 2019-09-09

## 2019-09-09 RX ORDER — FLUOROURACIL 50 MG/ML
400 INJECTION, SOLUTION INTRAVENOUS
Status: COMPLETED | OUTPATIENT
Start: 2019-09-09 | End: 2019-09-09

## 2019-09-09 RX ADMIN — FLUOROURACIL 745 MG: 50 INJECTION, SOLUTION INTRAVENOUS at 12:09

## 2019-09-09 RX ADMIN — DEXTROSE: 50 INJECTION, SOLUTION INTRAVENOUS at 09:09

## 2019-09-09 RX ADMIN — FLUOROURACIL 4465 MG: 50 INJECTION, SOLUTION INTRAVENOUS at 12:09

## 2019-09-09 RX ADMIN — DEXAMETHASONE SODIUM PHOSPHATE: 4 INJECTION, SOLUTION INTRAMUSCULAR; INTRAVENOUS at 09:09

## 2019-09-09 RX ADMIN — OXALIPLATIN 140 MG: 5 INJECTION, SOLUTION INTRAVENOUS at 10:09

## 2019-09-09 RX ADMIN — LEUCOVORIN CALCIUM 745 MG: 350 INJECTION, POWDER, LYOPHILIZED, FOR SOLUTION INTRAMUSCULAR; INTRAVENOUS at 10:09

## 2019-09-09 NOTE — PROGRESS NOTES
Subjective:       Patient ID: Irene Galo is a 53 y.o. female.    Chief Complaint: Malignant neoplasm of sigmoid colon  ONCOLOGIC HISTORY: Ms. Irene Galo is a 53-year-old female who underwent screening colonoscopy and was noted to have a circumferential partially obstructing mass in the rectosigmoid, which could not be traversed with the scope.  Inferior edge was 18 to 19 cm from the anal verge.  This was done on 01/10/2019.  Pathology from that revealed moderate dysplasia.  Malignancy could not be excluded.  She underwent a CT scan on 01/15/2019 that revealed a focal area of thickening in the rectosigmoid colon measuring 1.9 cm in length, which could possibly correspond to the abnormality seen on recent colonoscopy.  There is some  slight prominent lymph node superior to this region of thickening along the course of the inferior mesenteric vessels measuring approximately 6.5 mm.  CEA was 3.3.  She underwent sigmoid colectomy on 02/15/2019.  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                HPI She comes in to receive cycle 12 of adjuvant chemo with FOLFOX    She denies any nausea, vomiting, diarrhea, constipation, abdominal pain, weight loss or loss of appetite, chest pain, shortness of breath, leg swelling, fatigue, pain, headache, dizziness, or mood changes. Her ECOG PS is zero. Her UTI has resolved with Cipro. She notes numbness in her finger tips and feet which does not interfere with ADL's   She is accompanied by her .              Review of Systems   Constitutional: Negative for appetite change and unexpected weight change.   Eyes: Negative for visual disturbance.   Respiratory: Positive for shortness of breath. Negative for cough.    Cardiovascular: Negative for chest pain.   Gastrointestinal: Negative for abdominal pain and diarrhea.   Genitourinary: Positive for frequency.   Musculoskeletal: Negative for back pain.   Skin: Negative for rash.   Neurological: Negative  for headaches.   Hematological: Negative for adenopathy.   Psychiatric/Behavioral: The patient is not nervous/anxious.        PMFSH: all information reviewed and updated as relevant to today's visit  Objective:      Physical Exam   Constitutional: She is oriented to person, place, and time. She appears well-developed and well-nourished.   HENT:   Mouth/Throat: No oropharyngeal exudate.   Cardiovascular: Normal rate and normal heart sounds.   Pulmonary/Chest: Effort normal and breath sounds normal. She has no wheezes.   Abdominal: Soft. Bowel sounds are normal. There is no tenderness.   Musculoskeletal: She exhibits no edema or tenderness.   Lymphadenopathy:     She has no cervical adenopathy.   Neurological: She is alert and oriented to person, place, and time. Coordination normal.   Skin: Skin is warm and dry. No rash noted.   Psychiatric: She has a normal mood and affect. Judgment and thought content normal.   Vitals reviewed.        LABS:  WBC   Date Value Ref Range Status   09/09/2019 6.95 3.90 - 12.70 K/uL Final     Hemoglobin   Date Value Ref Range Status   09/09/2019 11.9 (L) 12.0 - 16.0 g/dL Final     Hematocrit   Date Value Ref Range Status   09/09/2019 38.7 37.0 - 48.5 % Final     Platelets   Date Value Ref Range Status   09/09/2019 86 (L) 150 - 350 K/uL Final     Gran # (ANC)   Date Value Ref Range Status   09/09/2019 4.4 1.8 - 7.7 K/uL Final     Comment:     The ANC is based on a white cell differential from an   automated cell counter. It has not been microscopically   reviewed for the presence of abnormal cells. Clinical   correlation is required.         Chemistry        Component Value Date/Time     09/09/2019 0808    K 3.9 09/09/2019 0808     09/09/2019 0808    CO2 25 09/09/2019 0808    BUN 7 09/09/2019 0808    CREATININE 0.8 09/09/2019 0808     (H) 09/09/2019 0808        Component Value Date/Time    CALCIUM 9.4 09/09/2019 0808    ALKPHOS 118 09/09/2019 0808    AST 24 09/09/2019  0808    ALT 18 09/09/2019 0808    BILITOT 0.5 09/09/2019 0808    ESTGFRAFRICA >60.0 09/09/2019 0808    EGFRNONAA >60.0 09/09/2019 0808          Assessment:       1. Malignant neoplasm of sigmoid colon    2. Secondary adenocarcinoma of lymph node        Plan:        1,2. She will proceed with cycle 12 of adjuvant FOLFOX and will return in 4 weeks with restaging CT scans    Above care plan was discussed with patient and accompanying son all questions were addressed to his satisfaction

## 2019-09-09 NOTE — PLAN OF CARE
Problem: Nausea and Vomiting (Chemotherapy Effects)  Goal: Fluid and Electrolyte Balance  Outcome: Ongoing (interventions implemented as appropriate)  Patient here for FOLFOX.  Assessment completed and labs reviewed.  VSS.  No needs at this time.  Chair reclined and blanket offered.  Will continue to monitor.

## 2019-09-11 ENCOUNTER — INFUSION (OUTPATIENT)
Dept: INFUSION THERAPY | Facility: HOSPITAL | Age: 54
End: 2019-09-11
Attending: INTERNAL MEDICINE
Payer: COMMERCIAL

## 2019-09-11 VITALS
TEMPERATURE: 98 F | BODY MASS INDEX: 31.83 KG/M2 | HEIGHT: 62 IN | DIASTOLIC BLOOD PRESSURE: 80 MMHG | RESPIRATION RATE: 18 BRPM | WEIGHT: 173 LBS | SYSTOLIC BLOOD PRESSURE: 109 MMHG | HEART RATE: 94 BPM

## 2019-09-11 DIAGNOSIS — C18.7 MALIGNANT NEOPLASM OF SIGMOID COLON: Primary | ICD-10-CM

## 2019-09-11 PROCEDURE — A4216 STERILE WATER/SALINE, 10 ML: HCPCS | Performed by: INTERNAL MEDICINE

## 2019-09-11 PROCEDURE — 96377 APPLICATON ON-BODY INJECTOR: CPT

## 2019-09-11 PROCEDURE — 25000003 PHARM REV CODE 250: Performed by: INTERNAL MEDICINE

## 2019-09-11 PROCEDURE — 63600175 PHARM REV CODE 636 W HCPCS: Performed by: INTERNAL MEDICINE

## 2019-09-11 RX ORDER — HEPARIN 100 UNIT/ML
500 SYRINGE INTRAVENOUS
Status: DISCONTINUED | OUTPATIENT
Start: 2019-09-11 | End: 2019-09-11 | Stop reason: HOSPADM

## 2019-09-11 RX ORDER — SODIUM CHLORIDE 0.9 % (FLUSH) 0.9 %
10 SYRINGE (ML) INJECTION
Status: DISCONTINUED | OUTPATIENT
Start: 2019-09-11 | End: 2019-09-11 | Stop reason: HOSPADM

## 2019-09-11 RX ADMIN — Medication 10 ML: at 10:09

## 2019-09-11 RX ADMIN — HEPARIN SODIUM (PORCINE) LOCK FLUSH IV SOLN 100 UNIT/ML 500 UNITS: 100 SOLUTION at 10:09

## 2019-09-11 RX ADMIN — PEGFILGRASTIM 6 MG: KIT SUBCUTANEOUS at 10:09

## 2019-09-11 NOTE — PLAN OF CARE
Problem: Adult Inpatient Plan of Care  Goal: Plan of Care Review  Outcome: Outcome(s) achieved Date Met: 09/11/19  Pt here for pump d/c, infusion bag empty, port flushed and heparinized per policy, deaccessed withouti issue, OBI placed to right arm activated and blinking green prior to d/c, no distress noted upon d/c to home      
none

## 2019-10-03 ENCOUNTER — HOSPITAL ENCOUNTER (OUTPATIENT)
Dept: RADIOLOGY | Facility: HOSPITAL | Age: 54
Discharge: HOME | End: 2019-10-03
Attending: INTERNAL MEDICINE
Payer: COMMERCIAL

## 2019-10-03 DIAGNOSIS — C18.7 MALIGNANT NEOPLASM OF SIGMOID COLON: ICD-10-CM

## 2019-10-03 PROCEDURE — 74177 CT ABD & PELVIS W/CONTRAST: CPT | Mod: TC

## 2019-10-03 PROCEDURE — 74177 CT CHEST ABDOMEN PELVIS WITH CONTRAST (XPD): ICD-10-PCS | Mod: 26,,, | Performed by: RADIOLOGY

## 2019-10-03 PROCEDURE — 25500020 PHARM REV CODE 255: Performed by: INTERNAL MEDICINE

## 2019-10-03 PROCEDURE — 71260 CT THORAX DX C+: CPT | Mod: 26,,, | Performed by: RADIOLOGY

## 2019-10-03 PROCEDURE — 74177 CT ABD & PELVIS W/CONTRAST: CPT | Mod: 26,,, | Performed by: RADIOLOGY

## 2019-10-03 PROCEDURE — 71260 CT CHEST ABDOMEN PELVIS WITH CONTRAST (XPD): ICD-10-PCS | Mod: 26,,, | Performed by: RADIOLOGY

## 2019-10-03 RX ADMIN — IOHEXOL 50 ML: 350 INJECTION, SOLUTION INTRAVENOUS at 08:10

## 2019-10-03 RX ADMIN — IOHEXOL 75 ML: 350 INJECTION, SOLUTION INTRAVENOUS at 09:10

## 2019-10-07 ENCOUNTER — OFFICE VISIT (OUTPATIENT)
Dept: HEMATOLOGY/ONCOLOGY | Facility: CLINIC | Age: 54
End: 2019-10-07
Payer: COMMERCIAL

## 2019-10-07 ENCOUNTER — LAB VISIT (OUTPATIENT)
Dept: LAB | Facility: HOSPITAL | Age: 54
End: 2019-10-07
Attending: INTERNAL MEDICINE
Payer: COMMERCIAL

## 2019-10-07 ENCOUNTER — PATIENT MESSAGE (OUTPATIENT)
Dept: OBSTETRICS AND GYNECOLOGY | Facility: CLINIC | Age: 54
End: 2019-10-07

## 2019-10-07 ENCOUNTER — PATIENT MESSAGE (OUTPATIENT)
Dept: SURGERY | Facility: CLINIC | Age: 54
End: 2019-10-07

## 2019-10-07 VITALS
RESPIRATION RATE: 20 BRPM | SYSTOLIC BLOOD PRESSURE: 143 MMHG | DIASTOLIC BLOOD PRESSURE: 90 MMHG | HEART RATE: 97 BPM | BODY MASS INDEX: 31.6 KG/M2 | WEIGHT: 171.75 LBS | OXYGEN SATURATION: 96 % | HEIGHT: 62 IN | TEMPERATURE: 98 F

## 2019-10-07 DIAGNOSIS — C18.7 MALIGNANT NEOPLASM OF SIGMOID COLON: ICD-10-CM

## 2019-10-07 DIAGNOSIS — G62.0 CHEMOTHERAPY-INDUCED NEUROPATHY: ICD-10-CM

## 2019-10-07 DIAGNOSIS — Z85.038 HISTORY OF COLON CANCER: Primary | ICD-10-CM

## 2019-10-07 DIAGNOSIS — T45.1X5A CHEMOTHERAPY-INDUCED NEUROPATHY: ICD-10-CM

## 2019-10-07 LAB
ALBUMIN SERPL BCP-MCNC: 3.7 G/DL (ref 3.5–5.2)
ALP SERPL-CCNC: 87 U/L (ref 55–135)
ALT SERPL W/O P-5'-P-CCNC: 19 U/L (ref 10–44)
ANION GAP SERPL CALC-SCNC: 8 MMOL/L (ref 8–16)
AST SERPL-CCNC: 23 U/L (ref 10–40)
BILIRUB SERPL-MCNC: 0.6 MG/DL (ref 0.1–1)
BUN SERPL-MCNC: 8 MG/DL (ref 6–20)
CALCIUM SERPL-MCNC: 9.9 MG/DL (ref 8.7–10.5)
CEA SERPL-MCNC: 2.4 NG/ML (ref 0–5)
CHLORIDE SERPL-SCNC: 106 MMOL/L (ref 95–110)
CO2 SERPL-SCNC: 24 MMOL/L (ref 23–29)
CREAT SERPL-MCNC: 0.7 MG/DL (ref 0.5–1.4)
ERYTHROCYTE [DISTWIDTH] IN BLOOD BY AUTOMATED COUNT: 16.3 % (ref 11.5–14.5)
EST. GFR  (AFRICAN AMERICAN): >60 ML/MIN/1.73 M^2
EST. GFR  (NON AFRICAN AMERICAN): >60 ML/MIN/1.73 M^2
GLUCOSE SERPL-MCNC: 106 MG/DL (ref 70–110)
HCT VFR BLD AUTO: 38.4 % (ref 37–48.5)
HGB BLD-MCNC: 11.6 G/DL (ref 12–16)
IMM GRANULOCYTES # BLD AUTO: 0.05 K/UL (ref 0–0.04)
MCH RBC QN AUTO: 30.1 PG (ref 27–31)
MCHC RBC AUTO-ENTMCNC: 30.2 G/DL (ref 32–36)
MCV RBC AUTO: 100 FL (ref 82–98)
NEUTROPHILS # BLD AUTO: 5.7 K/UL (ref 1.8–7.7)
PLATELET # BLD AUTO: 188 K/UL (ref 150–350)
PMV BLD AUTO: 11.4 FL (ref 9.2–12.9)
POTASSIUM SERPL-SCNC: 3.9 MMOL/L (ref 3.5–5.1)
PROT SERPL-MCNC: 8 G/DL (ref 6–8.4)
RBC # BLD AUTO: 3.86 M/UL (ref 4–5.4)
SODIUM SERPL-SCNC: 138 MMOL/L (ref 136–145)
WBC # BLD AUTO: 7.86 K/UL (ref 3.9–12.7)

## 2019-10-07 PROCEDURE — 99214 PR OFFICE/OUTPT VISIT, EST, LEVL IV, 30-39 MIN: ICD-10-PCS | Mod: S$GLB,,, | Performed by: INTERNAL MEDICINE

## 2019-10-07 PROCEDURE — 99214 OFFICE O/P EST MOD 30 MIN: CPT | Mod: S$GLB,,, | Performed by: INTERNAL MEDICINE

## 2019-10-07 PROCEDURE — 85027 COMPLETE CBC AUTOMATED: CPT

## 2019-10-07 PROCEDURE — 82378 CARCINOEMBRYONIC ANTIGEN: CPT

## 2019-10-07 PROCEDURE — 80053 COMPREHEN METABOLIC PANEL: CPT

## 2019-10-07 PROCEDURE — 3008F BODY MASS INDEX DOCD: CPT | Mod: CPTII,S$GLB,, | Performed by: INTERNAL MEDICINE

## 2019-10-07 PROCEDURE — 36415 COLL VENOUS BLD VENIPUNCTURE: CPT

## 2019-10-07 PROCEDURE — 3008F PR BODY MASS INDEX (BMI) DOCUMENTED: ICD-10-PCS | Mod: CPTII,S$GLB,, | Performed by: INTERNAL MEDICINE

## 2019-10-07 PROCEDURE — 99999 PR PBB SHADOW E&M-EST. PATIENT-LVL IV: ICD-10-PCS | Mod: PBBFAC,,, | Performed by: INTERNAL MEDICINE

## 2019-10-07 PROCEDURE — 99999 PR PBB SHADOW E&M-EST. PATIENT-LVL IV: CPT | Mod: PBBFAC,,, | Performed by: INTERNAL MEDICINE

## 2019-10-07 RX ORDER — GABAPENTIN 300 MG/1
300 CAPSULE ORAL 3 TIMES DAILY
Qty: 90 CAPSULE | Refills: 6 | Status: SHIPPED | OUTPATIENT
Start: 2019-10-07 | End: 2019-10-30

## 2019-10-07 NOTE — Clinical Note
"Hi.The patient's CT reveals "Haziness of the fat with suspected fluid in the region of the aortic arch near the origins of the brachiocephalic and left common carotid arteries.  Component of vasculitis not excluded"Is this something you would see? Thanks "

## 2019-10-07 NOTE — PROGRESS NOTES
"Subjective:       Patient ID: Irene Galo is a 53 y.o. female.    Chief Complaint: Malignant neoplasm of sigmoid colon  ONCOLOGIC HISTORY: Ms. Irene Galo is a 53-year-old female who underwent screening colonoscopy and was noted to have a circumferential partially obstructing mass in the rectosigmoid, which could not be traversed with the scope.  Inferior edge was 18 to 19 cm from the anal verge.  This was done on 01/10/2019.  Pathology from that revealed moderate dysplasia.  Malignancy could not be excluded.  She underwent a CT scan on 01/15/2019 that revealed a focal area of thickening in the rectosigmoid colon measuring 1.9 cm in length, which could possibly correspond to the abnormality seen on recent colonoscopy.  There is some  slight prominent lymph node superior to this region of thickening along the course of the inferior mesenteric vessels measuring approximately 6.5 mm.  CEA was 3.3.  She underwent sigmoid colectomy on 02/15/2019.  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            HPI She completed 12 cycles of adjuvant chemo with FOLFOX as of 9/9/19 and comes in to review restaging CT scans from 10/3/19 which reveal "Postoperative changes of colonic surgery with resection and reanastomosis in the rectosigmoid location.  Interval decrease in size in the low-density structure at the anterior margin of the pancreatic tail, currently measuring 2.3 x 1.4 cm, previously 4.7 x 3.0 cm.  This could simply be a resolving postoperative collection and should be followed on future examinations.  Small retroperitoneal lymph nodes are seen, not enlarged by CT criteria.Trace right pleural fluid with adjacent passive atelectasis. Left-sided Port-A-Cath has its tip in the region of the distal left brachiocephalic vein.  The positioning is more proximal as compared to prior examinations.  Consider adjustment into the SVC. Haziness of the fat with suspected fluid in the region of the aortic arch " "near the origins of the brachiocephalic and left common carotid arteries.  Component of vasculitis not excluded"  She feels well and denies any new issues. She notes tingling numbness in hands and feet which is interfering with ADL's      Review of Systems   Constitutional: Negative for appetite change and unexpected weight change.   Eyes: Negative for visual disturbance.   Respiratory: Positive for shortness of breath. Negative for cough.    Cardiovascular: Positive for chest pain.   Gastrointestinal: Negative for abdominal pain and diarrhea.   Genitourinary: Negative for frequency.   Musculoskeletal: Negative for back pain.   Skin: Negative for rash.   Neurological: Negative for headaches.   Hematological: Negative for adenopathy.   Psychiatric/Behavioral: The patient is not nervous/anxious.        Objective:      Physical Exam   Constitutional: She is oriented to person, place, and time. She appears well-developed and well-nourished.   HENT:   Mouth/Throat: No oropharyngeal exudate.   Cardiovascular: Normal rate and normal heart sounds.   Pulmonary/Chest: Effort normal and breath sounds normal. She has no wheezes.   Abdominal: Soft. Bowel sounds are normal. There is no tenderness.   Musculoskeletal: She exhibits no edema or tenderness.   Lymphadenopathy:     She has no cervical adenopathy.   Neurological: She is alert and oriented to person, place, and time. Coordination normal.   Skin: Skin is warm and dry. No rash noted.   Psychiatric: She has a normal mood and affect. Judgment and thought content normal.   Vitals reviewed.        LABS:  WBC   Date Value Ref Range Status   10/07/2019 7.86 3.90 - 12.70 K/uL Final     Hemoglobin   Date Value Ref Range Status   10/07/2019 11.6 (L) 12.0 - 16.0 g/dL Final     Hematocrit   Date Value Ref Range Status   10/07/2019 38.4 37.0 - 48.5 % Final     Platelets   Date Value Ref Range Status   10/07/2019 188 150 - 350 K/uL Final     Gran # (ANC)   Date Value Ref Range Status " "  10/07/2019 5.7 1.8 - 7.7 K/uL Final     Comment:     The ANC is based on a white cell differential from an   automated cell counter. It has not been microscopically   reviewed for the presence of abnormal cells. Clinical   correlation is required.         Chemistry        Component Value Date/Time     10/07/2019 0959    K 3.9 10/07/2019 0959     10/07/2019 0959    CO2 24 10/07/2019 0959    BUN 8 10/07/2019 0959    CREATININE 0.7 10/07/2019 0959     10/07/2019 0959        Component Value Date/Time    CALCIUM 9.9 10/07/2019 0959    ALKPHOS 87 10/07/2019 0959    AST 23 10/07/2019 0959    ALT 19 10/07/2019 0959    BILITOT 0.6 10/07/2019 0959    ESTGFRAFRICA >60.0 10/07/2019 0959    EGFRNONAA >60.0 10/07/2019 0959        CEA: 2.4  Assessment:       1. History of colon cancer    2. Chemotherapy-induced neuropathy        Plan:        1. CT scans with no evidence of recurrence. She will return in 3 months with labs and in 6 months with labs and CT scans. Colonoscopy will be due in 1/2020. Reviewed CT findings of "Haziness of the fat with suspected fluid in the region of the aortic arch near the origins of the brachiocephalic and left common carotid arteries.  Component of vasculitis not excluded". Unclear if this needs further work-up. Sent message to Dr. Waite   2. Increased gabapentin to 300 mg bid X 1 week and then 300 mg tid    ADDENDUM:  Discussed with Dr. Waite and he felt findings are very subtle. He does not see any aneurysm or strictures. He recommended ESR and CRP and then may be Rheumatology follow-up. Discussed with patient. She will undergo lab testing and then rheumatology accordingly         "

## 2019-10-07 NOTE — LETTER
October 7, 2019    Irene Galo  [address] [place] [address]             [facility]  [address]  [place] [address]  Phone: [phone] To Whom It May Concern:    Irene Galo is currently under my care for a diagnosis of Colon Cancer, for which she is still presently experiencing treatment side effects. She may return to work in approximately two weeks after her chemotherapy side effects have subsided. If you need any additional information from me, please feel free to contact me at my office.        Sincerely,          Sheila Welch MD

## 2019-10-08 DIAGNOSIS — C18.7 MALIGNANT NEOPLASM OF SIGMOID COLON: Primary | ICD-10-CM

## 2019-10-08 DIAGNOSIS — Z85.038 PERSONAL HISTORY OF COLON CANCER: ICD-10-CM

## 2019-10-16 ENCOUNTER — PATIENT OUTREACH (OUTPATIENT)
Dept: ADMINISTRATIVE | Facility: HOSPITAL | Age: 54
End: 2019-10-16

## 2019-10-18 ENCOUNTER — PATIENT MESSAGE (OUTPATIENT)
Dept: HEMATOLOGY/ONCOLOGY | Facility: CLINIC | Age: 54
End: 2019-10-18

## 2019-10-21 ENCOUNTER — TELEPHONE (OUTPATIENT)
Dept: SURGERY | Facility: CLINIC | Age: 54
End: 2019-10-21

## 2019-10-21 DIAGNOSIS — G62.0 CHEMOTHERAPY-INDUCED NEUROPATHY: Primary | ICD-10-CM

## 2019-10-21 DIAGNOSIS — T45.1X5A CHEMOTHERAPY-INDUCED NEUROPATHY: Primary | ICD-10-CM

## 2019-10-21 RX ORDER — LANOLIN ALCOHOL/MO/W.PET/CERES
1000 CREAM (GRAM) TOPICAL DAILY
Qty: 30 TABLET | Refills: 6 | Status: SHIPPED | OUTPATIENT
Start: 2019-10-21

## 2019-10-21 RX ORDER — PYRIDOXINE HCL (VITAMIN B6) 100 MG
400 TABLET ORAL DAILY
Qty: 120 TABLET | Refills: 4 | Status: SHIPPED | OUTPATIENT
Start: 2019-10-21

## 2019-10-21 NOTE — TELEPHONE ENCOUNTER
She can increase to 4 this week and 5 next week. Neuropathy gets worse before it gets better. ALso lets send B12 1000 mcg daily and B6 400 mg daily  If we go up to 7 tabs/day ans she still has intolerable symptoms then we should definitely have her see neurology

## 2019-10-21 NOTE — TELEPHONE ENCOUNTER
----- Message from Reagan Schroeder sent at 10/21/2019  2:45 PM CDT -----  Contact: Pt  Type:  Needs Medical Advice    Who Called: The Pt was returning a call to [place], please contact the Pt    Best Call Back Number: [phone]

## 2019-10-21 NOTE — TELEPHONE ENCOUNTER
Returned pt phone call.  Pt states she would like to wait until November to remove port.  Pt would like 11/25.   Removal appt and clinic appt scheduled.

## 2019-10-24 RX ORDER — BISACODYL 5 MG
20 TABLET, DELAYED RELEASE (ENTERIC COATED) ORAL ONCE
Qty: 4 TABLET | Refills: 0 | Status: SHIPPED | OUTPATIENT
Start: 2019-10-30 | End: 2019-10-31 | Stop reason: HOSPADM

## 2019-10-24 RX ORDER — SODIUM, POTASSIUM,MAG SULFATES 17.5-3.13G
354 SOLUTION, RECONSTITUTED, ORAL ORAL ONCE
Qty: 354 ML | Refills: 0 | Status: SHIPPED | OUTPATIENT
Start: 2019-10-30 | End: 2019-10-31 | Stop reason: HOSPADM

## 2019-10-28 ENCOUNTER — ANESTHESIA EVENT (OUTPATIENT)
Dept: ENDOSCOPY | Facility: HOSPITAL | Age: 54
End: 2019-10-28
Payer: COMMERCIAL

## 2019-10-28 ENCOUNTER — HOSPITAL ENCOUNTER (OUTPATIENT)
Dept: PREADMISSION TESTING | Facility: HOSPITAL | Age: 54
Discharge: HOME | End: 2019-10-28
Attending: COLON & RECTAL SURGERY
Payer: COMMERCIAL

## 2019-10-28 NOTE — DISCHARGE INSTRUCTIONS
Colonoscopy Outpatient procedure instructions    Prep Review  Follow instructions as written on Dr. Augustin's Colonoscopy Prep Sheet  Dr. Augustin patients have nothing to eat or drink after morning prep is complete      Take medications as instructed by your doctor.    Wear something comfortable that is easy for you to take off and put on.   Do not wear any makeup, jewelry, or body piercings. Leave valuables at home or let your family member keep them for you. Do not bring them to the Surgery area.     Date/Day of Procedure: Thursday 10/31/19    Arrival time: Someone will call you the workday day before the procedure  to give you an arrival time   If asked to report to the hospital before 7:00 am report to the Emergency Room.  If asked to report to the hospital at 7:00 a.m. or later report to Patient Registration  It is not necessary to report earlier than the time you are told.   Ignore any automated/computer generated calls telling to what time to report to the hospital.   Plan to be at the hospital for about 4 hours, however, it could be longer.

## 2019-10-28 NOTE — ANESTHESIA PREPROCEDURE EVALUATION
10/28/2019  Irene Galo is a 53 y.o., female.    Pre-op Assessment    I have reviewed the Patient Summary Reports.         Review of Systems  Anesthesia Hx:  No problems with previous Anesthesia  History of prior surgery of interest to airway management or planning:  Denies Personal Hx of Anesthesia complications.   Social:  Non-Smoker, Social Alcohol Use    Hematology/Oncology:  Hematology Normal      Current/Recent Cancer. (Colon cancer) chemotherapy and surgery Oncology Comments: Colon rsx 2/2019     EENT/Dental:EENT/Dental Normal   Cardiovascular:  Cardiovascular Normal  ECG has been reviewed.    Pulmonary:  Pulmonary Normal    Renal/:  Renal/ Normal     Hepatic/GI:   GERD, well controlled    Musculoskeletal:  Musculoskeletal Normal    Neurological:   Neuropathy to hands and feet s/t chemo  Peripheral Neuropathy    Endocrine:  Endocrine Normal    Dermatological:  Skin Normal    Psych:  Psychiatric Normal           Physical Exam  General:  Well nourished    Airway/Jaw/Neck:  Airway Findings: Mouth Opening: Normal Tongue: Normal  General Airway Assessment: Adult  Mallampati: II  Improves to II with phonation.  TM Distance: Normal, at least 6 cm  Jaw/Neck Findings:  Neck ROM: Normal ROM      Dental:  Dental Findings: In tact   Chest/Lungs:  Chest/Lungs Findings: Clear to auscultation, Normal Respiratory Rate     Heart/Vascular:  Heart Findings: Rate: Normal  Rhythm: Regular Rhythm  Sounds: Normal             Anesthesia Plan  Type of Anesthesia, risks & benefits discussed:  Anesthesia Type:  general  Patient's Preference:   Intra-op Monitoring Plan: standard ASA monitors  Intra-op Monitoring Plan Comments:   Post Op Pain Control Plan:   Post Op Pain Control Plan Comments:   Induction:   IV  Beta Blocker:  Patient is not currently on a Beta-Blocker (No further documentation required).        Informed Consent: Patient understands risks and agrees with Anesthesia plan.  Questions answered. Anesthesia consent signed with patient.  ASA Score: 3     Day of Surgery Review of History & Physical: I have interviewed and examined the patient. I have reviewed the patient's H&P dated: 10/31/19. There are no significant changes.  H&P update referred to the surgeon.         Ready For Surgery From Anesthesia Perspective.

## 2019-10-30 ENCOUNTER — OFFICE VISIT (OUTPATIENT)
Dept: INTERNAL MEDICINE | Facility: CLINIC | Age: 54
End: 2019-10-30
Payer: COMMERCIAL

## 2019-10-30 VITALS
HEIGHT: 62 IN | BODY MASS INDEX: 31.36 KG/M2 | DIASTOLIC BLOOD PRESSURE: 82 MMHG | HEART RATE: 74 BPM | WEIGHT: 170.44 LBS | RESPIRATION RATE: 16 BRPM | SYSTOLIC BLOOD PRESSURE: 140 MMHG | OXYGEN SATURATION: 97 %

## 2019-10-30 DIAGNOSIS — G62.0 NEUROPATHY DUE TO CHEMOTHERAPEUTIC DRUG: Primary | ICD-10-CM

## 2019-10-30 DIAGNOSIS — T45.1X5A NEUROPATHY DUE TO CHEMOTHERAPEUTIC DRUG: Primary | ICD-10-CM

## 2019-10-30 DIAGNOSIS — I10 ESSENTIAL HYPERTENSION: ICD-10-CM

## 2019-10-30 PROCEDURE — 99214 OFFICE O/P EST MOD 30 MIN: CPT | Mod: S$GLB,,, | Performed by: INTERNAL MEDICINE

## 2019-10-30 PROCEDURE — 3008F PR BODY MASS INDEX (BMI) DOCUMENTED: ICD-10-PCS | Mod: CPTII,S$GLB,, | Performed by: INTERNAL MEDICINE

## 2019-10-30 PROCEDURE — 99999 PR PBB SHADOW E&M-EST. PATIENT-LVL III: ICD-10-PCS | Mod: PBBFAC,,, | Performed by: INTERNAL MEDICINE

## 2019-10-30 PROCEDURE — 99214 PR OFFICE/OUTPT VISIT, EST, LEVL IV, 30-39 MIN: ICD-10-PCS | Mod: S$GLB,,, | Performed by: INTERNAL MEDICINE

## 2019-10-30 PROCEDURE — 99999 PR PBB SHADOW E&M-EST. PATIENT-LVL III: CPT | Mod: PBBFAC,,, | Performed by: INTERNAL MEDICINE

## 2019-10-30 PROCEDURE — 3008F BODY MASS INDEX DOCD: CPT | Mod: CPTII,S$GLB,, | Performed by: INTERNAL MEDICINE

## 2019-10-30 RX ORDER — PREGABALIN 150 MG/1
150 CAPSULE ORAL 2 TIMES DAILY
Qty: 60 CAPSULE | Refills: 5 | Status: SHIPPED | OUTPATIENT
Start: 2019-10-30 | End: 2020-04-29 | Stop reason: SDUPTHER

## 2019-10-30 NOTE — PROGRESS NOTES
Subjective:       Patient ID: Irene Galo is a 53 y.o. female.    Chief Complaint: Follow-up    Irene Galo is a 53 y.o. female  Here with follow up.  She had colon cancer s/p surgery and chemo .  She reports severe neuropathy ; hands and feet ; some gait issues; wobbly ; drops things   neurontin at very high doses not helping ;requesting some help .    Review of Systems   Constitutional: Negative for unexpected weight change.   HENT: Negative for hearing loss and trouble swallowing.    Respiratory: Negative for chest tightness and wheezing.    Cardiovascular: Negative for chest pain and palpitations.   Gastrointestinal: Negative for blood in stool, constipation, diarrhea and vomiting.   Endocrine: Negative for polydipsia and polyuria.   Genitourinary: Negative for difficulty urinating, dysuria, hematuria and menstrual problem.   Musculoskeletal: Negative for arthralgias, joint swelling and neck pain.   Neurological: Positive for numbness. Negative for weakness and headaches.   Psychiatric/Behavioral: Negative for confusion and dysphoric mood.       Objective:      Physical Exam   Constitutional: She is oriented to person, place, and time. She appears well-developed and well-nourished.   HENT:   Head: Normocephalic and atraumatic.   Right Ear: External ear normal.   Left Ear: External ear normal.   Nose: Nose normal.   Mouth/Throat: Oropharynx is clear and moist. No oropharyngeal exudate.   Eyes: Pupils are equal, round, and reactive to light. Conjunctivae and EOM are normal.   Neck: Normal range of motion. Neck supple. No thyromegaly present.   Cardiovascular: Normal rate, regular rhythm, normal heart sounds and intact distal pulses.   No murmur heard.  Pulmonary/Chest: Effort normal and breath sounds normal. No respiratory distress. She has no wheezes. She has no rales. She exhibits no tenderness.   Abdominal: Soft. Bowel sounds are normal. She exhibits no distension and no mass. There is no  tenderness. There is no rebound and no guarding.   Musculoskeletal: Normal range of motion. She exhibits no edema.   Neurological: She is alert and oriented to person, place, and time. She has normal reflexes.   Skin: Skin is warm and dry.   Psychiatric: She has a normal mood and affect. Her behavior is normal. Judgment and thought content normal.   Nursing note and vitals reviewed.      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 Essential hypertension        Plan:   Irene was seen today for follow-up.    Diagnoses and all orders for this visit:    Neuropathy due to chemotherapeutic drug  -     pregabalin (LYRICA) 150 MG capsule; Take 1 capsule (150 mg total) by mouth 2 (two) times daily.  DC gabapentin     Essential hypertension    Well controlled.    1. Keep weight close to ideal body weight.   2.   Avoid high salt foods (olives, pickles, smoked meats, salted potato chips, etc.).   Do not add salt to your food at the table.   Use only small amounts of salt when cooking.   3. Begin an exercise program. Discuss with your doctor what type of exercise program would be best for you. It doesn't have to be difficult. Even brisk walking for 20 minutes three times a week is a good form of exercise.   4. Avoid medicines which contain heart stimulants. This includes many cold and sinus decongestant pills and sprays as well as diet pills. Check the warnings about hypertension on the label. Stimulants such as amphetamine or cocaine could be lethal for someone with hypertension. Never take these.    See in 3 m   With labs     Problem List Items Addressed This Visit     None      Visit Diagnoses     Essential hypertension    -  Primary

## 2019-10-31 ENCOUNTER — HOSPITAL ENCOUNTER (OUTPATIENT)
Facility: HOSPITAL | Age: 54
Discharge: HOME | End: 2019-10-31
Attending: COLON & RECTAL SURGERY | Admitting: COLON & RECTAL SURGERY
Payer: COMMERCIAL

## 2019-10-31 ENCOUNTER — ANESTHESIA (OUTPATIENT)
Dept: ENDOSCOPY | Facility: HOSPITAL | Age: 54
End: 2019-10-31
Payer: COMMERCIAL

## 2019-10-31 VITALS
HEART RATE: 70 BPM | DIASTOLIC BLOOD PRESSURE: 64 MMHG | TEMPERATURE: 97 F | SYSTOLIC BLOOD PRESSURE: 112 MMHG | OXYGEN SATURATION: 100 % | RESPIRATION RATE: 18 BRPM

## 2019-10-31 DIAGNOSIS — Z85.038 HISTORY OF COLON CANCER: Primary | ICD-10-CM

## 2019-10-31 PROCEDURE — 88305 TISSUE EXAM BY PATHOLOGIST: CPT | Performed by: PATHOLOGY

## 2019-10-31 PROCEDURE — 45380 COLONOSCOPY AND BIOPSY: CPT | Mod: 33,,, | Performed by: COLON & RECTAL SURGERY

## 2019-10-31 PROCEDURE — 45380 PR COLONOSCOPY,BIOPSY: ICD-10-PCS | Mod: 33,,, | Performed by: COLON & RECTAL SURGERY

## 2019-10-31 PROCEDURE — 00811 ANES LWR INTST NDSC NOS: CPT | Mod: QZ,P3,33 | Performed by: NURSE ANESTHETIST, CERTIFIED REGISTERED

## 2019-10-31 PROCEDURE — 27201012 HC FORCEPS, HOT/COLD, DISP: Performed by: COLON & RECTAL SURGERY

## 2019-10-31 PROCEDURE — 45380 COLONOSCOPY AND BIOPSY: CPT | Performed by: COLON & RECTAL SURGERY

## 2019-10-31 PROCEDURE — 25000003 PHARM REV CODE 250: Performed by: NURSE ANESTHETIST, CERTIFIED REGISTERED

## 2019-10-31 PROCEDURE — 37000009 HC ANESTHESIA EA ADD 15 MINS: Performed by: COLON & RECTAL SURGERY

## 2019-10-31 PROCEDURE — 88305 TISSUE EXAM BY PATHOLOGIST: CPT | Mod: 26,,, | Performed by: PATHOLOGY

## 2019-10-31 PROCEDURE — 37000008 HC ANESTHESIA 1ST 15 MINUTES: Performed by: COLON & RECTAL SURGERY

## 2019-10-31 PROCEDURE — 63600175 PHARM REV CODE 636 W HCPCS: Performed by: NURSE ANESTHETIST, CERTIFIED REGISTERED

## 2019-10-31 PROCEDURE — 88305 TISSUE SPECIMEN TO PATHOLOGY - SURGERY: ICD-10-PCS | Mod: 26,,, | Performed by: PATHOLOGY

## 2019-10-31 RX ORDER — SODIUM CHLORIDE, SODIUM LACTATE, POTASSIUM CHLORIDE, CALCIUM CHLORIDE 600; 310; 30; 20 MG/100ML; MG/100ML; MG/100ML; MG/100ML
INJECTION, SOLUTION INTRAVENOUS CONTINUOUS
Status: DISCONTINUED | OUTPATIENT
Start: 2019-10-31 | End: 2019-10-31 | Stop reason: HOSPADM

## 2019-10-31 RX ORDER — LIDOCAINE HYDROCHLORIDE 20 MG/ML
INJECTION, SOLUTION EPIDURAL; INFILTRATION; INTRACAUDAL; PERINEURAL
Status: DISCONTINUED | OUTPATIENT
Start: 2019-10-31 | End: 2019-10-31

## 2019-10-31 RX ORDER — PROPOFOL 10 MG/ML
VIAL (ML) INTRAVENOUS
Status: DISCONTINUED | OUTPATIENT
Start: 2019-10-31 | End: 2019-10-31

## 2019-10-31 RX ORDER — SODIUM CHLORIDE, SODIUM LACTATE, POTASSIUM CHLORIDE, CALCIUM CHLORIDE 600; 310; 30; 20 MG/100ML; MG/100ML; MG/100ML; MG/100ML
INJECTION, SOLUTION INTRAVENOUS CONTINUOUS PRN
Status: DISCONTINUED | OUTPATIENT
Start: 2019-10-31 | End: 2019-10-31

## 2019-10-31 RX ADMIN — LIDOCAINE HYDROCHLORIDE 60 MG: 20 INJECTION, SOLUTION EPIDURAL; INFILTRATION; INTRACAUDAL; PERINEURAL at 08:10

## 2019-10-31 RX ADMIN — PROPOFOL 50 MG: 10 INJECTION, EMULSION INTRAVENOUS at 08:10

## 2019-10-31 RX ADMIN — PROPOFOL 150 MG: 10 INJECTION, EMULSION INTRAVENOUS at 08:10

## 2019-10-31 RX ADMIN — SODIUM CHLORIDE, SODIUM LACTATE, POTASSIUM CHLORIDE, AND CALCIUM CHLORIDE: .6; .31; .03; .02 INJECTION, SOLUTION INTRAVENOUS at 08:10

## 2019-10-31 NOTE — DISCHARGE SUMMARY
Discharge Note  Short Stay      SUMMARY     Admit Date: 10/31/2019    Attending Physician: Zachery Augustin MD     Discharge Physician: Zachery Augustin MD    Discharge Date: 10/31/2019 9:11 AM    Admission Diagnosis:  Personal history of colon cancer    Final Diagnosis:  Diverticulosis, colon polyps    Procedures Performed:    Colonoscopy polypectomy cold forceps    Disposition: Home or Self Care    Condition at Discharge:  Stable    Patient Instructions:   Current Discharge Medication List      CONTINUE these medications which have NOT CHANGED    Details   cyanocobalamin (VITAMIN B-12) 1000 MCG tablet Take 1 tablet (1,000 mcg total) by mouth once daily.  Qty: 30 tablet, Refills: 6    Associated Diagnoses: Chemotherapy-induced neuropathy      LO LOESTRIN FE 1 mg-10 mcg (24)/10 mcg (2) Tab Take 1 tablet by mouth once daily.  Qty: 84 tablet, Refills: 4    Associated Diagnoses: Uterine leiomyoma, unspecified location; Surveillance of contraceptive pill      loratadine (CLARITIN) 10 mg tablet Take 10 mg by mouth once daily.      multivitamin with minerals tablet Take 1 tablet by mouth once daily.      omeprazole (PRILOSEC) 40 MG capsule Take 1 capsule (40 mg total) by mouth once daily.  Qty: 60 capsule, Refills: 11    Associated Diagnoses: Gastroesophageal reflux disease, esophagitis presence not specified      ondansetron (ZOFRAN-ODT) 8 MG TbDL Take 1 tablet (8 mg total) by mouth every 6 (six) hours as needed.  Qty: 60 tablet, Refills: 4    Associated Diagnoses: Malignant neoplasm of sigmoid colon      pregabalin (LYRICA) 150 MG capsule Take 1 capsule (150 mg total) by mouth 2 (two) times daily.  Qty: 60 capsule, Refills: 6    Associated Diagnoses: Neuropathy due to chemotherapeutic drug      pyridoxine, vitamin B6, (B-6) 100 MG Tab Take 4 tablets (400 mg total) by mouth once daily.  Qty: 120 tablet, Refills: 4    Associated Diagnoses: Chemotherapy-induced neuropathy      ibuprofen (ADVIL) 200 MG tablet Take 200 mg by  mouth every 6 (six) hours as needed for Pain.      promethazine (PHENERGAN) 25 MG tablet Take 25 mg by mouth every 4 (four) hours.             Discharge Procedure Orders (must include Diet, Follow-up, Activity)   Discharge Procedure Orders (must include Diet, Follow-up, Activity)   Activity as tolerated        Repeat colonoscopy 2 years.  High fiber diet  RTO 6 months

## 2019-10-31 NOTE — TRANSFER OF CARE
Anesthesia Transfer of Care Note    Patient: Irene Galo    Procedure(s) Performed: Procedure(s) (LRB):  COLONOSCOPY (N/A)    Patient location: PACU    Anesthesia Type: general    Transport from OR: Transported from OR on 6-10 L/min O2 by face mask with adequate spontaneous ventilation    Post pain: adequate analgesia    Post assessment: no apparent anesthetic complications and tolerated procedure well    Post vital signs: stable    Level of consciousness: sedated    Nausea/Vomiting: no nausea/vomiting    Complications: none    Transfer of care protocol was followed      Last vitals:   Visit Vitals  /64 (BP Location: Right arm, Patient Position: Lying)   Pulse 70   Temp 36.1 °C (97 °F)   Resp 18   LMP 02/28/2019   SpO2 100%   Breastfeeding? No

## 2019-10-31 NOTE — OP NOTE
Patient Name: Irene Galo   Procedure Date: 10/31/2019  MRN: [id number]  : 1965  Age: 53 y.o.  Gender: female   Referring Physician :  Juan Lamb MD  Plan for Procedure: Monitored anaesthesia care  Indication: personal history of colon cancer  Procedure:   Colonoscopy polypectomy cold forceps    Surgeon(s) and Role:     * Zachery Augustin MD - Primary    Complications: None     Medicines: monitored anesthesia care    Procedure:  Prior to the procedure, a History and Physical was performed, and patient medications, allergies and sensitivities were reviewed.  The patient's tolerance of previous anesthesia was reviewed. The risks and benefits of the procedure and the sedation options and risks were discussed with the patient.  All questions were answered and informed consent was obtained.    After I obtained informed consent, the scope was passed under direct vision.  Throughout the procedure, the patient's blood pressure, pulse, and oxygen saturations were monitored continuously.  The Olympus scope CF - IQ228F was introduced through the anus and advanced to the terminal ileum, identified by appendiceal orifice and ileocecal valve.  The colonoscopy was performed without difficulty.  The patient tolerated the procedure well.  The quality of the bowel preparation was good     Findings:  diverticulosis,  Mild in degree, involving the ascending colon and the descending colon  evidence of prior surgery - prior sigmoid colectomy with patent end-to-end colorectal anastomosis  polyp(s) #1, 3 mm in size, located in the cecum removed by cold biopsy and sent for pathology  #2, 3 mm in size, located in the descending colon removed by cold biopsy and sent for pathology    EBL: none    Impression:  Two single benign appearing colon polyps, removed as above.  Mild colonic diverticulosis involving  the ascending colon and the descending colon   Prior sigmoid colectomy    Recommendation:  Repeat exam: 2  years  Return to my office 6 months  High fiber diet

## 2019-11-01 NOTE — ANESTHESIA POSTPROCEDURE EVALUATION
Anesthesia Post Evaluation    Patient: Irene Galo    Procedure(s) Performed: Procedure(s) (LRB):  COLONOSCOPY (N/A)    Final Anesthesia Type: general  Patient location during evaluation: PACU  Patient participation: Yes- Able to Participate  Level of consciousness: awake and alert, oriented and awake  Post-procedure vital signs: reviewed and stable  Pain management: adequate  Airway patency: patent  PONV status at discharge: No PONV  Anesthetic complications: no      Cardiovascular status: blood pressure returned to baseline  Respiratory status: unassisted, spontaneous ventilation and room air  Hydration status: euvolemic  Follow-up not needed.  Comments: [facility]          Vitals Value Taken Time   /64 10/31/2019  9:25 AM   Temp 36.1 °C (97 °F) 10/31/2019  9:04 AM   Pulse 70 10/31/2019  9:25 AM   Resp 18 10/31/2019  9:25 AM   SpO2 100 % 10/31/2019  9:25 AM         Event Time     Out of Recovery 09:29:58          Pain/Bon Score: Bon Score: 10 (10/31/2019  9:17 AM)

## 2019-11-21 ENCOUNTER — PATIENT OUTREACH (OUTPATIENT)
Dept: ADMINISTRATIVE | Facility: OTHER | Age: 54
End: 2019-11-21

## 2019-11-25 ENCOUNTER — PROCEDURE VISIT (OUTPATIENT)
Dept: SURGERY | Facility: CLINIC | Age: 54
End: 2019-11-25
Payer: COMMERCIAL

## 2019-11-25 VITALS
WEIGHT: 170 LBS | HEART RATE: 71 BPM | HEIGHT: 62 IN | BODY MASS INDEX: 31.28 KG/M2 | SYSTOLIC BLOOD PRESSURE: 151 MMHG | DIASTOLIC BLOOD PRESSURE: 76 MMHG

## 2019-11-25 DIAGNOSIS — Z95.828 PORT-A-CATH IN PLACE: Primary | ICD-10-CM

## 2019-11-25 PROCEDURE — 36590 PR REMOVAL TUNNELED CV CATH W SUBQ PORT OR PUMP: ICD-10-PCS | Mod: S$GLB,,, | Performed by: SURGERY

## 2019-11-25 PROCEDURE — 36590 REMOVAL TUNNELED CV CATH: CPT | Mod: S$GLB,,, | Performed by: SURGERY

## 2019-11-25 NOTE — PROCEDURES
"Removal, Portacath  Date/Time: 11/25/2019 1:00 PM  Performed by: Padmini Little MD  Authorized by: Vladimir Bernal Jr., MD     Consent Done?:  Yes (Written)  Time out: Immediately prior to procedure a "time out" was called to verify the correct patient, procedure, equipment, support staff and site/side marked as required.      STAFF:  Assistants?: Yes    List of assistants:  Ladarius Chun I was present for the entire procedure.  INDICATIONS:: Port in place.  LOCATION:  Location: Left chest.left    PREP:  Patient was prepped and draped in the normal sterile fashion.Position:  Supine    ANESTHESIA:  Anesthesia:  Local infiltration  Local anesthetic:  Lidocaine 1% without epinephrine    PROCEDURE DETAILS:  Excision type:  Skin  Malignancy:  Benign  Excision size (cm):  4  Scalpel size:  15  Incision type:  Single straight  Specimens?: Yes    Specimens submitted to pathology.  Hemostasis was obtained.  Estimated blood loss (cc):  5  Wound closure:  Simple  Wound repair size (cm):  4  Sutures:  4-0 Monocryl, 3-0 Vicryl and 2-0 Vicryl  Sterile dressings:  Steri-strips and Mastisol  Needle, instrument, and sponge counts were correct.  Patient tolerated the procedure well with no immediate complications.  Post-operative instructions were provided for the patient.  Patient was discharged and will follow up for wound check.      "

## 2019-11-25 NOTE — PROGRESS NOTES
Progress Notes  General Surgery      SUBJECTIVE:     Chief Complaint/Reason for Admission: Port Removal     History of Present Illness: Irene Galo is a 53 y.o. female with hx of pT3 N1a moderately differentiated adenocarcinoma of the colon presents to clinic for evaluation for port removal. Port placed in March, s/p FOLFOX therapy per her oncologist. Completed chemotherapy in September. She notes bilateral upper and lower extremity neuropathy that is gradually improving. She notes no other symptoms. The port is placed on her left side.     Current Outpatient Medications on File Prior to Visit   Medication Sig    cetirizine (ZYRTEC) 10 MG tablet Take 10 mg by mouth every evening.     famotidine (PEPCID) 20 MG tablet Take 20 mg by mouth daily as needed for Heartburn (acid reflux).    LO LOESTRIN FE 1 mg-10 mcg (24)/10 mcg (2) Tab Take 1 tablet by mouth once daily.    multivitamin with minerals tablet Take 1 tablet by mouth once daily.    ondansetron (ZOFRAN-ODT) 8 MG TbDL Take 1 tablet (8 mg total) by mouth every 6 (six) hours as needed.    promethazine (PHENERGAN) 25 MG tablet Take 1 tablet (25 mg total) by mouth every 6 (six) hours as needed for Nausea.     No current facility-administered medications on file prior to visit.        Review of patient's allergies indicates:   Allergen Reactions    No known drug allergies        Past Medical History:   Diagnosis Date    Allergy     Bilateral hearing loss     Colon cancer     GERD (gastroesophageal reflux disease)     Uterine fibroid      Past Surgical History:   Procedure Laterality Date    COLONOSCOPY WITH BIOPSY N/A 1/10/2019    Per[facility]    lasix  2000    both eyes    MOBILIZATION, SPLENIC FLEXURE  2/15/2019    Performed by [facility]in MD at [facility] FLR    STAPEDECTOMY  2009    STAPEDECTOMY Right 6/6/2013    Performed by Felix Azevedo MD at [facility] OR 2ND FLR    XI ROBOTIC COLECTOMY, SIGMOID N/A  2/15/2019    Performed by [facility] Augustin MD at [facility] OR [address] [place] [address]     Family History   Problem Relation Age of Onset    Diverticulitis Father     Lung cancer Father     Breast cancer Paternal Grandmother 70    Heart disease Brother 45        Tobacco smoker     Diabetes Brother     Diabetes Brother      Social History     Tobacco Use    Smoking status: Never Smoker    Smokeless tobacco: Never Used   Substance Use Topics    Alcohol use: Yes     Comment: 1 glass of wine , 2-3 times a week    Drug use: No        Review of Systems   Constitutional: Negative for fatigue.   HENT: Negative for congestion, sore throat and trouble swallowing.    Respiratory: Negative for shortness of breath and wheezing.    Cardiovascular: Negative for chest pain and palpitations.   Gastrointestinal:Negative for abdominal pain, constipation and diarrhea.   Genitourinary: Negative for difficulty urinating and frequency.   Musculoskeletal: Negative for arthralgias and myalgias.   Skin: Negative for color change and wound.   Neurological: Negative for light-headedness and headaches.   Hematological: Negative for adenopathy. Does not bruise/bleed easily.   Psychiatric/Behavioral: Negative for confusion and hallucinations.     OBJECTIVE:     Physical Exam   Constitutional: She is oriented to person, place, and time. She appears well-developed and well-nourished. No distress.   HENT:   Head: Normocephalic and atraumatic.   Eyes: Conjunctivae and EOM are normal.   Neck: Normal range of motion. Neck supple.   Cardiovascular: Post-a-cath in place in left chest. Normal rate, regular rhythm and intact distal pulses.   Pulmonary/Chest: Effort normal and breath sounds normal. No respiratory distress.   Abdominal: Soft. She exhibits no distension. There is no tenderness.   Musculoskeletal: Normal range of motion. She exhibits no edema.   Neurological: She is alert and oriented to person, place, and time.   Skin: Skin is warm and dry.   Psychiatric:  She has a normal mood and affect. Thought content normal.         ASSESSMENT/PLAN:   53 YOF with PMH colon cancer presents for port removal, in minor procedure room today.     - Consent signed  - Site marked  - See procedure note   - Post-procedure instructions given     -Padmini Little M.D  General Surgery PGY1

## 2019-12-04 ENCOUNTER — PATIENT MESSAGE (OUTPATIENT)
Dept: OBSTETRICS AND GYNECOLOGY | Facility: CLINIC | Age: 54
End: 2019-12-04

## 2019-12-09 NOTE — TELEPHONE ENCOUNTER
Pt needing to switch OCP d/t insurance coverage, but pt is questioning if she still needs to take the OCP d/t age.

## 2019-12-10 DIAGNOSIS — Z85.038 HISTORY OF COLON CANCER: Primary | ICD-10-CM

## 2019-12-10 NOTE — TELEPHONE ENCOUNTER
Pt currently on LoLoestrin, reports will not be covered on insurance anymore. Pt provided list of approved medications, but would like to know if it is still needed. Pt currently 54, has not had cycle in 1 year(chemo). Would like to know recommendations. Annual due 3-2020.

## 2020-01-10 ENCOUNTER — LAB VISIT (OUTPATIENT)
Dept: LAB | Facility: HOSPITAL | Age: 55
End: 2020-01-10
Attending: INTERNAL MEDICINE
Payer: COMMERCIAL

## 2020-01-10 ENCOUNTER — OFFICE VISIT (OUTPATIENT)
Dept: HEMATOLOGY/ONCOLOGY | Facility: CLINIC | Age: 55
End: 2020-01-10
Payer: COMMERCIAL

## 2020-01-10 VITALS
OXYGEN SATURATION: 97 % | SYSTOLIC BLOOD PRESSURE: 126 MMHG | DIASTOLIC BLOOD PRESSURE: 69 MMHG | TEMPERATURE: 98 F | RESPIRATION RATE: 18 BRPM | BODY MASS INDEX: 32.57 KG/M2 | HEIGHT: 62 IN | HEART RATE: 59 BPM | WEIGHT: 177 LBS

## 2020-01-10 DIAGNOSIS — T45.1X5A CHEMOTHERAPY-INDUCED PERIPHERAL NEUROPATHY: ICD-10-CM

## 2020-01-10 DIAGNOSIS — Z85.038 HISTORY OF COLON CANCER: Primary | ICD-10-CM

## 2020-01-10 DIAGNOSIS — G62.0 CHEMOTHERAPY-INDUCED PERIPHERAL NEUROPATHY: ICD-10-CM

## 2020-01-10 DIAGNOSIS — C77.9 SECONDARY ADENOCARCINOMA OF LYMPH NODE: ICD-10-CM

## 2020-01-10 DIAGNOSIS — C18.7 MALIGNANT NEOPLASM OF SIGMOID COLON: ICD-10-CM

## 2020-01-10 DIAGNOSIS — Z85.038 HISTORY OF COLON CANCER: ICD-10-CM

## 2020-01-10 LAB
ALBUMIN SERPL BCP-MCNC: 3.8 G/DL (ref 3.5–5.2)
ALP SERPL-CCNC: 96 U/L (ref 55–135)
ALT SERPL W/O P-5'-P-CCNC: 22 U/L (ref 10–44)
ANION GAP SERPL CALC-SCNC: 7 MMOL/L (ref 8–16)
AST SERPL-CCNC: 25 U/L (ref 10–40)
BILIRUB SERPL-MCNC: 0.5 MG/DL (ref 0.1–1)
BUN SERPL-MCNC: 13 MG/DL (ref 6–20)
CALCIUM SERPL-MCNC: 9.4 MG/DL (ref 8.7–10.5)
CEA SERPL-MCNC: 1.7 NG/ML (ref 0–5)
CHLORIDE SERPL-SCNC: 108 MMOL/L (ref 95–110)
CO2 SERPL-SCNC: 27 MMOL/L (ref 23–29)
CREAT SERPL-MCNC: 0.8 MG/DL (ref 0.5–1.4)
CRP SERPL-MCNC: 8.3 MG/L (ref 0–8.2)
ERYTHROCYTE [DISTWIDTH] IN BLOOD BY AUTOMATED COUNT: 13.7 % (ref 11.5–14.5)
ERYTHROCYTE [SEDIMENTATION RATE] IN BLOOD BY WESTERGREN METHOD: 15 MM/HR (ref 0–36)
EST. GFR  (AFRICAN AMERICAN): >60 ML/MIN/1.73 M^2
EST. GFR  (NON AFRICAN AMERICAN): >60 ML/MIN/1.73 M^2
GLUCOSE SERPL-MCNC: 100 MG/DL (ref 70–110)
HCT VFR BLD AUTO: 43 % (ref 37–48.5)
HGB BLD-MCNC: 13.3 G/DL (ref 12–16)
IMM GRANULOCYTES # BLD AUTO: 0.02 K/UL (ref 0–0.04)
MCH RBC QN AUTO: 29.1 PG (ref 27–31)
MCHC RBC AUTO-ENTMCNC: 30.9 G/DL (ref 32–36)
MCV RBC AUTO: 94 FL (ref 82–98)
NEUTROPHILS # BLD AUTO: 4.4 K/UL (ref 1.8–7.7)
PLATELET # BLD AUTO: 213 K/UL (ref 150–350)
PMV BLD AUTO: 11.8 FL (ref 9.2–12.9)
POTASSIUM SERPL-SCNC: 4.2 MMOL/L (ref 3.5–5.1)
PROT SERPL-MCNC: 7.3 G/DL (ref 6–8.4)
RBC # BLD AUTO: 4.57 M/UL (ref 4–5.4)
SODIUM SERPL-SCNC: 142 MMOL/L (ref 136–145)
WBC # BLD AUTO: 7.07 K/UL (ref 3.9–12.7)

## 2020-01-10 PROCEDURE — 86140 C-REACTIVE PROTEIN: CPT

## 2020-01-10 PROCEDURE — 3008F BODY MASS INDEX DOCD: CPT | Mod: CPTII,S$GLB,, | Performed by: PHYSICIAN ASSISTANT

## 2020-01-10 PROCEDURE — 82378 CARCINOEMBRYONIC ANTIGEN: CPT

## 2020-01-10 PROCEDURE — 99999 PR PBB SHADOW E&M-EST. PATIENT-LVL V: ICD-10-PCS | Mod: PBBFAC,,, | Performed by: PHYSICIAN ASSISTANT

## 2020-01-10 PROCEDURE — 3078F DIAST BP <80 MM HG: CPT | Mod: CPTII,S$GLB,, | Performed by: PHYSICIAN ASSISTANT

## 2020-01-10 PROCEDURE — 3078F PR MOST RECENT DIASTOLIC BLOOD PRESSURE < 80 MM HG: ICD-10-PCS | Mod: CPTII,S$GLB,, | Performed by: PHYSICIAN ASSISTANT

## 2020-01-10 PROCEDURE — 3008F PR BODY MASS INDEX (BMI) DOCUMENTED: ICD-10-PCS | Mod: CPTII,S$GLB,, | Performed by: PHYSICIAN ASSISTANT

## 2020-01-10 PROCEDURE — 3074F SYST BP LT 130 MM HG: CPT | Mod: CPTII,S$GLB,, | Performed by: PHYSICIAN ASSISTANT

## 2020-01-10 PROCEDURE — 85027 COMPLETE CBC AUTOMATED: CPT

## 2020-01-10 PROCEDURE — 85652 RBC SED RATE AUTOMATED: CPT

## 2020-01-10 PROCEDURE — 36415 COLL VENOUS BLD VENIPUNCTURE: CPT

## 2020-01-10 PROCEDURE — 99213 OFFICE O/P EST LOW 20 MIN: CPT | Mod: S$GLB,,, | Performed by: PHYSICIAN ASSISTANT

## 2020-01-10 PROCEDURE — 3074F PR MOST RECENT SYSTOLIC BLOOD PRESSURE < 130 MM HG: ICD-10-PCS | Mod: CPTII,S$GLB,, | Performed by: PHYSICIAN ASSISTANT

## 2020-01-10 PROCEDURE — 80053 COMPREHEN METABOLIC PANEL: CPT

## 2020-01-10 PROCEDURE — 99999 PR PBB SHADOW E&M-EST. PATIENT-LVL V: CPT | Mod: PBBFAC,,, | Performed by: PHYSICIAN ASSISTANT

## 2020-01-10 PROCEDURE — 99213 PR OFFICE/OUTPT VISIT, EST, LEVL III, 20-29 MIN: ICD-10-PCS | Mod: S$GLB,,, | Performed by: PHYSICIAN ASSISTANT

## 2020-01-10 NOTE — Clinical Note
Please schedule 3mos f/u with Dr. Welch with CBC, CMP, CEA and CT scan of chest, abdomen and pelvis. Thank you.

## 2020-01-10 NOTE — PROGRESS NOTES
Subjective:       Patient ID: Irene Galo is a 54 y.o. female.    Chief Complaint: Malignant neoplasm of sigmoid colon    History:  Past Medical History:   Diagnosis Date    Allergy     Bilateral hearing loss     Colon cancer     GERD (gastroesophageal reflux disease)     Uterine fibroid      Past Surgical History:   Procedure Laterality Date    COLONOSCOPY N/A 1/10/2019    Procedure: COLONOSCOPY WITH BIOPSY;  Surgeon: Zachery Augustin MD;  Location: [facility];  Service: Endoscopy;  Laterality: N/A;    COLONOSCOPY N/A 10/31/2019    Procedure: COLONOSCOPY;  Surgeon: Zachery Augustin MD;  Location: [facility];  Service: Colon and Rectal;  Laterality: N/A;    INSERTION OF TUNNELED CENTRAL VENOUS CATHETER (CVC) WITH SUBCUTANEOUS PORT N/A 3/28/2019    Procedure: [id number];  Surgeon: Vladimir Bernal Jr., MD;  Location: [facility] OR 2ND FLR;  Service: General;  Laterality: N/A;    lasix  2000    both eyes    MOBILIZATION OF SPLENIC FLEXURE  2/15/2019    Procedure: MOBILIZATION, SPLENIC FLEXURE;  Surgeon: Zachery Augustin MD;  Location: [facility] OR 2ND FLR;  Service: Colon and Rectal;;    ROBOT-ASSISTED LAPAROSCOPIC RESECTION OF SIGMOID COLON USING DA OLE XI N/A 2/15/2019    Procedure: XI ROBOTIC COLECTOMY, SIGMOID;  Surgeon: Zachery Augustin MD;  Location: [facility] OR 2ND FLR;  Service: Colon and Rectal;  Laterality: N/A;    STAPEDECTOMY  2009         History of colon cancer    3/22/2019 Initial Diagnosis     Malignant neoplasm of sigmoid colon      3/25/2019 -  Chemotherapy     Treatment Summary   Plan Name: OP FOLFOX 6 Q2W  Treatment Goal: Curative  Status: Active  Start Date: 4/2/2019  End Date: 9/11/2019  Provider: Sheila Welch MD  Chemotherapy: fluorouracil injection 755 mg, 400 mg/m2 = 755 mg, Intravenous, Clinic/HOD 1 time, 12 of 12 cycles  Administration: 755 mg (4/2/2019), 755 mg (4/16/2019), 755 mg (5/1/2019), 755 mg (5/20/2019), 755 mg (6/3/2019), 755 mg (6/18/2019), 755 mg (7/1/2019), 755 mg  (7/15/2019), 755 mg (7/29/2019), 755 mg (8/12/2019), 755 mg (8/27/2019), 745 mg (9/9/2019)  fluorouracil 2,400 mg/m2 = 4,535 mg in sodium chloride 0.9% 138 mL chemo infusion, 2,400 mg/m2 = 4,535 mg, Intravenous, Over 46 hours, 12 of 12 cycles  Administration: 4,535 mg (4/2/2019), 4,535 mg (4/16/2019), 4,535 mg (5/1/2019), 4,535 mg (5/20/2019), 4,535 mg (6/3/2019), 4,535 mg (6/18/2019), 4,535 mg (7/1/2019), 4,535 mg (7/15/2019), 4,535 mg (7/29/2019), 4,535 mg (8/12/2019), 4,535 mg (8/27/2019), 4,465 mg (9/9/2019)  leucovorin calcium 400 mg/m2 = 755 mg in dextrose 5 % 250 mL infusion, 400 mg/m2 = 755 mg, Intravenous, Clinic/HOD 1 time, 12 of 12 cycles  Administration: 755 mg (4/2/2019), 755 mg (4/16/2019), 755 mg (5/1/2019), 755 mg (5/20/2019), 755 mg (6/3/2019), 755 mg (6/18/2019), 755 mg (7/1/2019), 755 mg (7/15/2019), 755 mg (7/29/2019), 755 mg (8/12/2019), 755 mg (8/27/2019), 745 mg (9/9/2019)  oxaliplatin (ELOXATIN) 85 mg/m2 = 161 mg in dextrose 5 % 532.2 mL chemo infusion, 85 mg/m2 = 161 mg, Intravenous, Clinic/HOD 1 time, 12 of 12 cycles  Dose modification: 75 mg/m2 (original dose 85 mg/m2, Cycle 11)  Administration: 161 mg (4/2/2019), 161 mg (4/16/2019), 161 mg (5/1/2019), 161 mg (5/20/2019), 161 mg (6/3/2019), 161 mg (6/18/2019), 161 mg (7/1/2019), 161 mg (7/15/2019), 161 mg (7/29/2019), 161 mg (8/12/2019), 142 mg (8/27/2019), 140 mg (9/9/2019)          Allergies:  Review of patient's allergies indicates:   Allergen Reactions    No known drug allergies         HPI ONCOLOGIC HISTORY: Ms. Irene Galo is a 53-year-old female who underwent screening colonoscopy and was noted to have a circumferential partially obstructing mass in the rectosigmoid, which could not be traversed with the scope.  Inferior edge was 18 to 19 cm from the anal verge.  This was done on 01/10/2019.  Pathology from that revealed moderate dysplasia.  Malignancy could not be excluded.  She underwent a CT scan on 01/15/2019 that revealed a  "focal area of thickening in the rectosigmoid colon measuring 1.9 cm in length, which could possibly correspond to the abnormality seen on recent colonoscopy.  There is some  slight prominent lymph node superior to this region of thickening along the course of the inferior mesenteric vessels measuring approximately 6.5 mm.  CEA was 3.3.  She underwent sigmoid colectomy on 02/15/2019.  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      Interval History 1/10/2020: She completed 12 cycles of adjuvant chemo with FOLFOX as of 9/9/19. Previous restaging CT scans from 10/3/19 revealed "Postoperative changes of colonic surgery with resection and reanastomosis in the rectosigmoid location.  Interval decrease in size in the low-density structure at the anterior margin of the pancreatic tail, currently measuring 2.3 x 1.4 cm, previously 4.7 x 3.0 cm.  This could simply be a resolving postoperative collection and should be followed on future examinations.  Small retroperitoneal lymph nodes are seen, not enlarged by CT criteria.Trace right pleural fluid with adjacent passive atelectasis. Left-sided Port-A-Cath has its tip in the region of the distal left brachiocephalic vein.  The positioning is more proximal as compared to prior examinations.  Consider adjustment into the SVC. Haziness of the fat with suspected fluid in the region of the aortic arch near the origins of the brachiocephalic and left common carotid arteries.  Component of vasculitis not excluded"    She feels well and denies any new issues. She continues to note tingling numbness in hands and feet which is interfering with ADL's. She is currently being followed by Dr. Lamb, wherein she is currently on Lyrica 150mg BID. This is providing some relief but she is also interested in acupuncture. She is seeing a chiropractor as well, in an effort to improve her neuropathy. She continues to remain highly functional. It was suggested by Dr. Lamb that a referral " to Neurology would be appropriate. Otherwise, she is feeling well. She is eating and has a good appetite. No rectal pain, bleeding, constipation, diarrhea, hematochezia or melena. Last colonoscopy performed on 10/31/2019 was negative for malignancy. 2 single benign polyps were removed. She will be due for a repeat exam in 2 years. ECOG status is zero.     ECOG:  ECOG SCORE          Review of Systems   Constitutional: Negative for appetite change, chills, fatigue, fever and unexpected weight change.   HENT: Negative for congestion, facial swelling, mouth sores, sinus pressure, sinus pain, sore throat and trouble swallowing.    Eyes: Negative for photophobia, pain and visual disturbance.   Respiratory: Negative for cough, chest tightness, shortness of breath, wheezing and stridor.    Cardiovascular: Negative for chest pain and leg swelling.   Gastrointestinal: Negative for abdominal distention, abdominal pain, blood in stool, constipation, diarrhea, nausea, rectal pain and vomiting.   Genitourinary: Negative for dysuria, flank pain, frequency and urgency.   Musculoskeletal: Negative for back pain, gait problem, joint swelling and neck pain.   Skin: Negative for color change and rash.   Neurological: Positive for numbness. Negative for dizziness, syncope, weakness, light-headedness and headaches.   Hematological: Negative for adenopathy. Does not bruise/bleed easily.   Psychiatric/Behavioral: Negative for agitation, behavioral problems and confusion. The patient is not nervous/anxious.        Objective:      Physical Exam   Constitutional: She is oriented to person, place, and time. She appears well-developed and well-nourished. No distress.   HENT:   Head: Normocephalic and atraumatic.   Mouth/Throat: No oropharyngeal exudate.   Eyes: Pupils are equal, round, and reactive to light. Conjunctivae and EOM are normal. No scleral icterus.   Neck: Normal range of motion. Neck supple.   Cardiovascular: Normal rate and  regular rhythm. Exam reveals no gallop and no friction rub.   No murmur heard.  Pulmonary/Chest: Breath sounds normal. No respiratory distress. She exhibits no tenderness.   Abdominal: Soft. Bowel sounds are normal. She exhibits no distension and no mass. There is no tenderness. There is no rebound and no guarding. No hernia.   Musculoskeletal: Normal range of motion. She exhibits no edema or tenderness.   Lymphadenopathy:     She has no cervical adenopathy.   Neurological: She is alert and oriented to person, place, and time.   Skin: Skin is warm and dry. Capillary refill takes less than 2 seconds. No rash noted. No erythema.   Psychiatric: She has a normal mood and affect. Her behavior is normal. Judgment and thought content normal.   Nursing note and vitals reviewed.      Results:   Contains abnormal data CBC Oncology   Order: [id number]   Status:  Final result   Visible to patient:  No (Not Released)   Next appt:  01/20/2020 at 02:45 PM in Internal Medicine (Juan Lamb MD)   Dx:  Malignant neoplasm of sigmoid colon    Ref Range & Units 09:47   WBC 3.90 - 12.70 K/uL 7.07    RBC 4.00 - 5.40 M/uL 4.57    Hemoglobin 12.0 - 16.0 g/dL 13.3    Hematocrit 37.0 - 48.5 % 43.0    Mean Corpuscular Volume 82 - 98 fL 94    Mean Corpuscular Hemoglobin 27.0 - 31.0 pg 29.1    Mean Corpuscular Hemoglobin Conc 32.0 - 36.0 g/dL 30.9Low     RDW 11.5 - 14.5 % 13.7    Platelets 150 - 350 K/uL 213    MPV 9.2 - 12.9 fL 11.8    Gran # (ANC) 1.8 - 7.7 K/uL 4.4    Comment: The ANC is based on a white cell differential from an   automated cell counter. It has not been microscopically   reviewed for the presence of abnormal cells. Clinical   correlation is required.    Immature Grans (Abs) 0.00 - 0.04 K/uL 0.02    Comment: Mild elevation in immature granulocytes is non specific and   can be seen in a variety of conditions including stress response,   acute inflammation, trauma and pregnancy. Correlation with other   laboratory and  clinical findings is essential.            Comprehensive metabolic panel   Order: [id number]   Status:  Final result   Visible to patient:  No (Not Released)   Next appt:  01/20/2020 at 02:45 PM in Internal Medicine (Juan Lamb MD)   Dx:  Malignant neoplasm of sigmoid colon    Ref Range & Units 09:47   Sodium 136 - 145 mmol/L 142    Potassium 3.5 - 5.1 mmol/L 4.2    Chloride 95 - 110 mmol/L 108    CO2 23 - 29 mmol/L 27    Glucose 70 - 110 mg/dL 100    BUN, Bld 6 - 20 mg/dL 13    Creatinine 0.5 - 1.4 mg/dL 0.8    Calcium 8.7 - 10.5 mg/dL 9.4    Total Protein 6.0 - 8.4 g/dL 7.3    Albumin 3.5 - 5.2 g/dL 3.8    Total Bilirubin 0.1 - 1.0 mg/dL 0.5    Comment: For infants and newborns, interpretation of results should be based   on gestational age, weight and in agreement with clinical   observations.   Premature Infant recommended reference ranges:   Up to 24 hours.............<8.0 mg/dL   Up to 48 hours............<12.0 mg/dL   3-5 days..................<15.0 mg/dL   6-29 days.................<15.0 mg/dL    Alkaline Phosphatase 55 - 135 U/L 96    AST 10 - 40 U/L 25    ALT 10 - 44 U/L 22    Anion Gap 8 - 16 mmol/L 7Low     eGFR if African American >60 mL/min/1.73 m^2 >60.0    eGFR if non African American >60 mL/min/1.73 m^2 >60.0    Comment: Calculation used to obtain the estimated glomerular filtration   rate (eGFR) is the CKD-EPI equation.              CEA   Order: [id number]   Status:  Final result   Visible to patient:  No (Not Released)   Next appt:  01/20/2020 at 02:45 PM in Internal Medicine (Juan Lamb MD)   Dx:  Malignant neoplasm of sigmoid colon    Ref Range & Units 09:47   CEA 0.0 - 5.0 ng/mL 1.7    Comment: CEA Normal Range:   Non-Smokers: 0-3.0 ng/mL   Smokers:     0-5.0 ng/mL            C-REACTIVE PROTEIN   Order: [id number]   Status:  Final result   Visible to patient:  No (Not Released)   Next appt:  01/20/2020 at 02:45 PM in Internal Medicine (Juan Lamb MD)   Dx:  History of colon  cancer    Ref Range & Units 09:47   CRP 0.0 - 8.2 mg/L 8.3High             Sedimentation rate   Order: [id number]   Status:  Final result   Visible to patient:  No (Not Released)   Next appt:  01/20/2020 at 02:45 PM in Internal Medicine (Juan Lamb MD)   Dx:  History of colon cancer    Ref Range & Units 09:47   Sed Rate 0 - 36 mm/Hr 15            CT Chest Abdomen Pelvis With Contrast   Order: [id number]   Status:  Final result   Visible to patient:  Yes (Patient Portal)   Next appt:  01/20/2020 at 02:45 PM in Internal Medicine (Juan Lamb MD)   Dx:  Malignant neoplasm of sigmoid colon   Details     Reading Physician Reading Date Result Priority   Carla Riddle MD 10/3/2019 Routine      Narrative     EXAMINATION:  CT CHEST ABDOMEN PELVIS WITH CONTRAST (XPD)    CLINICAL HISTORY:  colon cancer after adjuvant chemo; Malignant neoplasm of sigmoid colon    TECHNIQUE:  Low dose axial images, sagittal and coronal reformations were obtained from the thoracic inlet to the pubic symphysis following the IV administration of 75 mL of Omnipaque 350 and the oral administration of 30 ml of Omnipaque 350.    COMPARISON:  03/22/2019, 01/15/2019, 04/02/2019    FINDINGS:  The thyroid appears normal.  The main central airways are patent.  The esophagus appears normal.  The heart is normal in size.  The great vessels are normal in course and calibre.  Left-sided Port-A-Cath has its tip in the region of the brachiocephalic confluence.  There is some haziness of the fat the probable fluid at the aortic arch near the origins of the right brachiocephalic and left common carotid arteries.  Trace pericardial fluid.  No mediastinal, hilar or axillary adenopathy is detected.    Trace right pleural fluid.  Right basilar scarring/atelectasis.  No consolidation.  No pulmonary nodules.    No radiopaque gallstones are seen.  No intrahepatic or extrahepatic biliary ductal dilatation is identified.  In the region of pancreatic tail  there is a this 2.3 by 1.4 cm collection anteriorly, decreased in size from 4.7 x 3.0 cm.  The liver, spleen, adrenal glands and kidneys are normal in size, shape and contour.  There is a 8 mm cyst at the midpole of the right kidney.  No hydronephrosis or hydroureter.  The urinary bladder is well distended and appears normal.  Suspected uterine fibroids.  Adnexa appear normal.    Postoperative changes of previous colonic resection with reanastomosis in the rectosigmoid location.  Mild constipation.  The appendix is normal.  No free air, free fluid or obstruction.  Small retroperitoneal lymph nodes are seen, not enlarged by CT criteria.    Age-appropriate degenerative changes affect the skeleton.      Impression       Postoperative changes of colonic surgery with resection and reanastomosis in the rectosigmoid location.  Interval decrease in size in the low-density structure at the anterior margin of the pancreatic tail, currently measuring 2.3 x 1.4 cm, previously 4.7 x 3.0 cm.  This could simply be a resolving postoperative collection and should be followed on future examinations.  Small retroperitoneal lymph nodes are seen, not enlarged by CT criteria.    Trace right pleural fluid with adjacent passive atelectasis.    Left-sided Port-A-Cath has its tip in the region of the distal left brachiocephalic vein.  The positioning is more proximal as compared to prior examinations.  Consider adjustment into the SVC.    Haziness of the fat with suspected fluid in the region of the aortic arch near the origins of the brachiocephalic and left common carotid arteries.  Component of vasculitis not excluded.      Electronically signed by: Carla Riddle MD  Date: 10/03/2019  Time: 10:16           Procedure:   Colonoscopy polypectomy cold forceps     Surgeon(s) and Role:     * Zachery Augustin MD - Primary     Complications: None      Medicines: monitored anesthesia care     Procedure:  Prior to the procedure, a History and  "Physical was performed, and patient medications, allergies and sensitivities were reviewed.  The patient's tolerance of previous anesthesia was reviewed. The risks and benefits of the procedure and the sedation options and risks were discussed with the patient.  All questions were answered and informed consent was obtained.     After I obtained informed consent, the scope was passed under direct vision.  Throughout the procedure, the patient's blood pressure, pulse, and oxygen saturations were monitored continuously.  The Olympus scope CF - [id number] was introduced through the anus and advanced to the terminal ileum, identified by appendiceal orifice and ileocecal valve.  The colonoscopy was performed without difficulty.  The patient tolerated the procedure well.  The quality of the bowel preparation was good      Findings:  diverticulosis,  Mild in degree, involving the ascending colon and the descending colon  evidence of prior surgery - prior sigmoid colectomy with patent end-to-end colorectal anastomosis  polyp(s) #1, 3 mm in size, located in the cecum removed by cold biopsy and sent for pathology  #2, 3 mm in size, located in the descending colon removed by cold biopsy and sent for pathology     EBL: none     Impression:  Two single benign appearing colon polyps, removed as above.  Mild colonic diverticulosis involving  the ascending colon and the descending colon   Prior sigmoid colectomy     Recommendation:  Repeat exam: 2 years  Return to my office 6 months  High fiber diet         Assessment:     1. History of colon cancer    2. Chemotherapy-induced neuropathy         Plan:   1. CT scans performed in October were stable with no evidence of recurrence. She will return in 3 months with labs and CT scans. Colonoscopy will be due in 10/2021 per Dr. Augustin  -Dr. Welch. Reviewed CT findings with Dr. Waite of "Haziness of the fat with suspected fluid in the region of the aortic arch near the origins of the " "brachiocephalic and left common carotid arteries.  Component of vasculitis not excluded". ESR is neg and CRP is fairly stable. Unclear if this needs further work-up. Will review again when CT scan is repeated.     2. Continue on Lyrica 150mg BID. There is a possibility to increase the dose to 300mg BID (per UTD). She will f/u with Dr. Lamb and will have a referral placed to Neurology.     Pte and family members displayed understanding of the above encounter and treatment plan. All thoughtful questions were answered to their satisfaction. Pte was advised to notify the care team or proceed to the ER if signs and symptoms worsen.       STAFF NOTE:  Agree with above  "

## 2020-01-20 ENCOUNTER — OFFICE VISIT (OUTPATIENT)
Dept: INTERNAL MEDICINE | Facility: CLINIC | Age: 55
End: 2020-01-20
Payer: COMMERCIAL

## 2020-01-20 VITALS
SYSTOLIC BLOOD PRESSURE: 112 MMHG | OXYGEN SATURATION: 98 % | RESPIRATION RATE: 18 BRPM | WEIGHT: 176.38 LBS | DIASTOLIC BLOOD PRESSURE: 68 MMHG | BODY MASS INDEX: 33.3 KG/M2 | HEIGHT: 61 IN | HEART RATE: 78 BPM

## 2020-01-20 DIAGNOSIS — G62.0 NEUROPATHY DUE TO CHEMOTHERAPEUTIC DRUG: Primary | ICD-10-CM

## 2020-01-20 DIAGNOSIS — T45.1X5A NEUROPATHY DUE TO CHEMOTHERAPEUTIC DRUG: Primary | ICD-10-CM

## 2020-01-20 DIAGNOSIS — Z85.038 HISTORY OF COLON CANCER: ICD-10-CM

## 2020-01-20 DIAGNOSIS — D37.5: ICD-10-CM

## 2020-01-20 PROCEDURE — 3008F BODY MASS INDEX DOCD: CPT | Mod: CPTII,S$GLB,, | Performed by: INTERNAL MEDICINE

## 2020-01-20 PROCEDURE — 99999 PR PBB SHADOW E&M-EST. PATIENT-LVL III: CPT | Mod: PBBFAC,,, | Performed by: INTERNAL MEDICINE

## 2020-01-20 PROCEDURE — 90714 TD VACCINE GREATER THAN OR EQUAL TO 7YO PRESERVATIVE FREE IM: ICD-10-PCS | Mod: S$GLB,,, | Performed by: INTERNAL MEDICINE

## 2020-01-20 PROCEDURE — 99999 PR PBB SHADOW E&M-EST. PATIENT-LVL III: ICD-10-PCS | Mod: PBBFAC,,, | Performed by: INTERNAL MEDICINE

## 2020-01-20 PROCEDURE — 99214 PR OFFICE/OUTPT VISIT, EST, LEVL IV, 30-39 MIN: ICD-10-PCS | Mod: 25,S$GLB,, | Performed by: INTERNAL MEDICINE

## 2020-01-20 PROCEDURE — 90471 TD VACCINE GREATER THAN OR EQUAL TO 7YO PRESERVATIVE FREE IM: ICD-10-PCS | Mod: S$GLB,,, | Performed by: INTERNAL MEDICINE

## 2020-01-20 PROCEDURE — 90714 TD VACC NO PRESV 7 YRS+ IM: CPT | Mod: S$GLB,,, | Performed by: INTERNAL MEDICINE

## 2020-01-20 PROCEDURE — 3074F PR MOST RECENT SYSTOLIC BLOOD PRESSURE < 130 MM HG: ICD-10-PCS | Mod: CPTII,S$GLB,, | Performed by: INTERNAL MEDICINE

## 2020-01-20 PROCEDURE — 3078F PR MOST RECENT DIASTOLIC BLOOD PRESSURE < 80 MM HG: ICD-10-PCS | Mod: CPTII,S$GLB,, | Performed by: INTERNAL MEDICINE

## 2020-01-20 PROCEDURE — 3078F DIAST BP <80 MM HG: CPT | Mod: CPTII,S$GLB,, | Performed by: INTERNAL MEDICINE

## 2020-01-20 PROCEDURE — 90471 IMMUNIZATION ADMIN: CPT | Mod: S$GLB,,, | Performed by: INTERNAL MEDICINE

## 2020-01-20 PROCEDURE — 3008F PR BODY MASS INDEX (BMI) DOCUMENTED: ICD-10-PCS | Mod: CPTII,S$GLB,, | Performed by: INTERNAL MEDICINE

## 2020-01-20 PROCEDURE — 3074F SYST BP LT 130 MM HG: CPT | Mod: CPTII,S$GLB,, | Performed by: INTERNAL MEDICINE

## 2020-01-20 PROCEDURE — 99214 OFFICE O/P EST MOD 30 MIN: CPT | Mod: 25,S$GLB,, | Performed by: INTERNAL MEDICINE

## 2020-01-20 NOTE — PROGRESS NOTES
Subjective:       Patient ID: Irene Glao is a 54 y.o. female.    Chief Complaint: Follow-up (Neuropathy, no other complaints at present )    Irene Galo is a 53 y.o. female  Here with follow up.  She had colon cancer s/p surgery and chemo .  She reports severe neuropathy ; hands and feet ; some gait issues; wobbly ; drops things   neurontin at very high doses not helping ;requesting some help .      Lyrica is helping somewhat       Review of Systems   Constitutional: Negative for activity change and unexpected weight change.   HENT: Negative for hearing loss, rhinorrhea and trouble swallowing.    Eyes: Negative for discharge and visual disturbance.   Respiratory: Negative for chest tightness and wheezing.    Cardiovascular: Negative for chest pain and palpitations.   Gastrointestinal: Negative for blood in stool, constipation, diarrhea and vomiting.   Endocrine: Negative for polydipsia and polyuria.   Genitourinary: Negative for difficulty urinating, dysuria, hematuria and menstrual problem.   Musculoskeletal: Negative for arthralgias, joint swelling and neck pain.   Neurological: Positive for numbness. Negative for weakness and headaches.   Psychiatric/Behavioral: Negative for confusion and dysphoric mood.       Objective:      Physical Exam   Constitutional: She is oriented to person, place, and time. She appears well-developed and well-nourished.   HENT:   Head: Normocephalic and atraumatic.   Right Ear: External ear normal.   Left Ear: External ear normal.   Nose: Nose normal.   Mouth/Throat: Oropharynx is clear and moist. No oropharyngeal exudate.   Eyes: Pupils are equal, round, and reactive to light. Conjunctivae and EOM are normal.   Neck: Normal range of motion. Neck supple. No thyromegaly present.   Cardiovascular: Normal rate, regular rhythm, normal heart sounds and intact distal pulses.   No murmur heard.  Pulmonary/Chest: Effort normal and breath sounds normal. No respiratory  distress. She has no wheezes. She has no rales. She exhibits no tenderness.   Abdominal: Soft. Bowel sounds are normal. She exhibits no distension and no mass. There is no tenderness. There is no rebound and no guarding.   Musculoskeletal: Normal range of motion. She exhibits no edema.   Neurological: She is alert and oriented to person, place, and time. She has normal reflexes.   Skin: Skin is warm and dry.   Psychiatric: She has a normal mood and affect. Her behavior is normal. Judgment and thought content normal.   Nursing note and vitals reviewed.      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 History of colon cancer    3. 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    Diagnoses and all orders for this visit:    Neuropathy due to chemotherapeutic drug  Continue lyrica.    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

## 2020-01-31 ENCOUNTER — TELEPHONE (OUTPATIENT)
Dept: HEMATOLOGY/ONCOLOGY | Facility: CLINIC | Age: 55
End: 2020-01-31

## 2020-01-31 NOTE — TELEPHONE ENCOUNTER
----- Message from Lety Sargent sent at 1/31/2020  2:05 PM CST -----  Contact: PT  PT called in requesting to get her appointments on 04/9 rescheduled to the week before or week after - she has testing for school that week.    Callback: [phone]

## 2020-03-09 ENCOUNTER — PATIENT OUTREACH (OUTPATIENT)
Dept: ADMINISTRATIVE | Facility: OTHER | Age: 55
End: 2020-03-09

## 2020-03-12 ENCOUNTER — OFFICE VISIT (OUTPATIENT)
Dept: OBSTETRICS AND GYNECOLOGY | Facility: CLINIC | Age: 55
End: 2020-03-12
Payer: COMMERCIAL

## 2020-03-12 VITALS
HEIGHT: 61 IN | SYSTOLIC BLOOD PRESSURE: 114 MMHG | BODY MASS INDEX: 33.61 KG/M2 | DIASTOLIC BLOOD PRESSURE: 82 MMHG | HEART RATE: 87 BPM | WEIGHT: 178 LBS | RESPIRATION RATE: 14 BRPM

## 2020-03-12 DIAGNOSIS — Z12.39 SCREENING FOR BREAST CANCER: ICD-10-CM

## 2020-03-12 DIAGNOSIS — C18.9 MALIGNANT NEOPLASM OF COLON, UNSPECIFIED PART OF COLON: ICD-10-CM

## 2020-03-12 DIAGNOSIS — Z12.4 CERVICAL CANCER SCREENING: ICD-10-CM

## 2020-03-12 DIAGNOSIS — Z01.419 WELL WOMAN EXAM WITH ROUTINE GYNECOLOGICAL EXAM: Primary | ICD-10-CM

## 2020-03-12 PROCEDURE — 99999 PR PBB SHADOW E&M-EST. PATIENT-LVL III: ICD-10-PCS | Mod: PBBFAC,,, | Performed by: OBSTETRICS & GYNECOLOGY

## 2020-03-12 PROCEDURE — 3079F DIAST BP 80-89 MM HG: CPT | Mod: CPTII,S$GLB,, | Performed by: OBSTETRICS & GYNECOLOGY

## 2020-03-12 PROCEDURE — 99396 PR PREVENTIVE VISIT,EST,40-64: ICD-10-PCS | Mod: S$GLB,,, | Performed by: OBSTETRICS & GYNECOLOGY

## 2020-03-12 PROCEDURE — 99999 PR PBB SHADOW E&M-EST. PATIENT-LVL III: CPT | Mod: PBBFAC,,, | Performed by: OBSTETRICS & GYNECOLOGY

## 2020-03-12 PROCEDURE — 99396 PREV VISIT EST AGE 40-64: CPT | Mod: S$GLB,,, | Performed by: OBSTETRICS & GYNECOLOGY

## 2020-03-12 PROCEDURE — 3074F PR MOST RECENT SYSTOLIC BLOOD PRESSURE < 130 MM HG: ICD-10-PCS | Mod: CPTII,S$GLB,, | Performed by: OBSTETRICS & GYNECOLOGY

## 2020-03-12 PROCEDURE — 3079F PR MOST RECENT DIASTOLIC BLOOD PRESSURE 80-89 MM HG: ICD-10-PCS | Mod: CPTII,S$GLB,, | Performed by: OBSTETRICS & GYNECOLOGY

## 2020-03-12 PROCEDURE — 3074F SYST BP LT 130 MM HG: CPT | Mod: CPTII,S$GLB,, | Performed by: OBSTETRICS & GYNECOLOGY

## 2020-03-12 NOTE — PROGRESS NOTES
Subjective:    Patient ID: Irene Galo is a 54 y.o. y.o. female.     Chief Complaint: Annual Well Woman Exam     History of Present Illness:  Irene presents today for Annual Well Woman exam. She describes her menses as absent s/p chemo.She denies pelvic pain.  She denies breast tenderness, masses, nipple discharge. She denies difficulty with urination or bowel movements. She denies menopausal symptoms such as hotflashes, vaginal dryness, and night sweats. She denies bloating, early satiety, or weight changes. She is sexually active. Contraception is by  vasectomy.    Patient had colon resection with chemotherapy preformed last year. She reports since chemo she has had no cycles. No vasomotor symptoms.     The following portions of the patient's history were reviewed and updated as appropriate: allergies, current medications, past family history, past medical history, past social history, past surgical history and problem list.      Menstrual History:   Patient's last menstrual period was 2019..     OB History        4    Para   4    Term   4            AB        Living   5       SAB        TAB        Ectopic        Multiple   1    Live Births                     The following portions of the patient's history were reviewed and updated as appropriate: allergies, current medications, past family history, past medical history, past social history, past surgical history and problem list.        ROS:     Review of Systems   Constitutional: Negative for activity change, appetite change, chills, diaphoresis, fatigue, fever and unexpected weight change.   HENT: Negative for mouth sores and tinnitus.    Eyes: Negative for discharge and visual disturbance.   Respiratory: Negative for cough, shortness of breath and wheezing.    Cardiovascular: Negative for chest pain, palpitations and leg swelling.   Gastrointestinal: Negative for abdominal pain, bloating, blood in stool, constipation, diarrhea,  "nausea and vomiting.   Endocrine: Negative for diabetes, hair loss, hot flashes, hyperthyroidism and hypothyroidism.   Genitourinary: Negative for dysuria, flank pain, frequency, genital sores, hematuria, urgency, vaginal bleeding, vaginal discharge, vaginal pain, postcoital bleeding and vaginal odor.   Musculoskeletal: Negative for back pain, joint swelling and myalgias.   Integumentary:  Negative for rash, acne, breast mass, nipple discharge and breast skin changes.   Neurological: Negative for seizures, syncope, numbness and headaches.   Hematological: Negative for adenopathy. Does not bruise/bleed easily.   Psychiatric/Behavioral: Negative for depression and sleep disturbance. The patient is not nervous/anxious.    Breast: Negative for mass, mastodynia, nipple discharge and skin changes      Objective:    Vital Signs:  Vitals:    03/12/20 1535   BP: 114/82   Pulse: 87   Resp: 14   Weight: 80.7 kg (178 lb)   Height: 5' 1" (1.549 m)         Physical Exam:  General:  alert, cooperative, appears stated age   Skin:  Skin color, texture, turgor normal. No rashes or lesions   HEENT:  conjunctivae/corneas clear. PERRL.   Neck: supple, trachea midline, no adenopathy or thyromegally   Respiratory:  clear to auscultation bilaterally   Heart:  regular rate and rhythm, S1, S2 normal, no murmur, click, rub or gallop   Breasts:  no discharge, erythema, or tenderness   Abdomen:  normal findings: bowel sounds normal, no masses palpable, no organomegaly and soft, non-tender   Pelvis: External genitalia: normal general appearance  Urinary system: urethral meatus normal, bladder nontender  Vaginal: normal mucosa without prolapse or lesions  Cervix: normal appearance  Uterus: normal size, shape, position  Adnexa: normal size, nontender bilaterally   Extremities: Normal ROM; no edema, no cyanosis   Neurologial: Normal strength and tone. No focal numbness or weakness. Reflexes 2+ and equal.   Psychiatric: normal mood, speech, dress, " and thought processes         Assessment:       Healthy female exam.     1. Well woman exam with routine gynecological exam    2. Screening for breast cancer    3. Malignant neoplasm of colon, unspecified part of colon    4. Cervical cancer screening          Plan:      Pap smear deferred   MMG ordered  RTC in 1 year    COUNSELING:  Irene was counseled on STD pevention, use and side-effects of various contraceptive measures, A.C.O.G. Pap guidelines and recommendations for yearly pelvic exams in addition to recommendations for monthly self breast exams; to see her PCP for other health maintenance.

## 2020-03-27 ENCOUNTER — PATIENT MESSAGE (OUTPATIENT)
Dept: HEMATOLOGY/ONCOLOGY | Facility: CLINIC | Age: 55
End: 2020-03-27

## 2020-04-14 ENCOUNTER — HOSPITAL ENCOUNTER (OUTPATIENT)
Dept: RADIOLOGY | Facility: HOSPITAL | Age: 55
Discharge: HOME | End: 2020-04-14
Attending: PHYSICIAN ASSISTANT
Payer: COMMERCIAL

## 2020-04-14 ENCOUNTER — LAB VISIT (OUTPATIENT)
Dept: LAB | Facility: HOSPITAL | Age: 55
End: 2020-04-14
Attending: PHYSICIAN ASSISTANT
Payer: COMMERCIAL

## 2020-04-14 DIAGNOSIS — Z85.038 HISTORY OF COLON CANCER: ICD-10-CM

## 2020-04-14 LAB
ALBUMIN SERPL BCP-MCNC: 3.8 G/DL (ref 3.5–5.2)
ALP SERPL-CCNC: 90 U/L (ref 55–135)
ALT SERPL W/O P-5'-P-CCNC: 18 U/L (ref 10–44)
ANION GAP SERPL CALC-SCNC: 8 MMOL/L (ref 8–16)
AST SERPL-CCNC: 20 U/L (ref 10–40)
BILIRUB SERPL-MCNC: 0.4 MG/DL (ref 0.1–1)
BUN SERPL-MCNC: 11 MG/DL (ref 6–20)
CALCIUM SERPL-MCNC: 9.5 MG/DL (ref 8.7–10.5)
CEA SERPL-MCNC: 1.4 NG/ML (ref 0–5)
CHLORIDE SERPL-SCNC: 107 MMOL/L (ref 95–110)
CO2 SERPL-SCNC: 28 MMOL/L (ref 23–29)
CREAT SERPL-MCNC: 0.8 MG/DL (ref 0.5–1.4)
ERYTHROCYTE [DISTWIDTH] IN BLOOD BY AUTOMATED COUNT: 14.2 % (ref 11.5–14.5)
EST. GFR  (AFRICAN AMERICAN): >60 ML/MIN/1.73 M^2
EST. GFR  (NON AFRICAN AMERICAN): >60 ML/MIN/1.73 M^2
GLUCOSE SERPL-MCNC: 95 MG/DL (ref 70–110)
HCT VFR BLD AUTO: 42.7 % (ref 37–48.5)
HGB BLD-MCNC: 13.8 G/DL (ref 12–16)
IMM GRANULOCYTES # BLD AUTO: 0.02 K/UL (ref 0–0.04)
MAGNESIUM SERPL-MCNC: 2.1 MG/DL (ref 1.6–2.6)
MCH RBC QN AUTO: 29.6 PG (ref 27–31)
MCHC RBC AUTO-ENTMCNC: 32.3 G/DL (ref 32–36)
MCV RBC AUTO: 92 FL (ref 82–98)
NEUTROPHILS # BLD AUTO: 3 K/UL (ref 1.8–7.7)
PLATELET # BLD AUTO: 187 K/UL (ref 150–350)
PMV BLD AUTO: 11 FL (ref 9.2–12.9)
POTASSIUM SERPL-SCNC: 4.2 MMOL/L (ref 3.5–5.1)
PROT SERPL-MCNC: 7.1 G/DL (ref 6–8.4)
RBC # BLD AUTO: 4.66 M/UL (ref 4–5.4)
SODIUM SERPL-SCNC: 143 MMOL/L (ref 136–145)
WBC # BLD AUTO: 5.59 K/UL (ref 3.9–12.7)

## 2020-04-14 PROCEDURE — 71260 CT CHEST ABDOMEN PELVIS WITH CONTRAST (XPD): ICD-10-PCS | Mod: 26,,, | Performed by: RADIOLOGY

## 2020-04-14 PROCEDURE — 82378 CARCINOEMBRYONIC ANTIGEN: CPT

## 2020-04-14 PROCEDURE — 83735 ASSAY OF MAGNESIUM: CPT

## 2020-04-14 PROCEDURE — 74177 CT ABD & PELVIS W/CONTRAST: CPT | Mod: TC

## 2020-04-14 PROCEDURE — 74177 CT ABD & PELVIS W/CONTRAST: CPT | Mod: 26,,, | Performed by: RADIOLOGY

## 2020-04-14 PROCEDURE — 36415 COLL VENOUS BLD VENIPUNCTURE: CPT

## 2020-04-14 PROCEDURE — 25500020 PHARM REV CODE 255: Performed by: PHYSICIAN ASSISTANT

## 2020-04-14 PROCEDURE — 71260 CT THORAX DX C+: CPT | Mod: 26,,, | Performed by: RADIOLOGY

## 2020-04-14 PROCEDURE — 80053 COMPREHEN METABOLIC PANEL: CPT

## 2020-04-14 PROCEDURE — 74177 CT CHEST ABDOMEN PELVIS WITH CONTRAST (XPD): ICD-10-PCS | Mod: 26,,, | Performed by: RADIOLOGY

## 2020-04-14 PROCEDURE — 85027 COMPLETE CBC AUTOMATED: CPT

## 2020-04-14 RX ADMIN — IOHEXOL 30 ML: 350 INJECTION, SOLUTION INTRAVENOUS at 10:04

## 2020-04-14 RX ADMIN — IOHEXOL 75 ML: 350 INJECTION, SOLUTION INTRAVENOUS at 10:04

## 2020-04-17 ENCOUNTER — TELEPHONE (OUTPATIENT)
Dept: HEMATOLOGY/ONCOLOGY | Facility: CLINIC | Age: 55
End: 2020-04-17

## 2020-04-17 ENCOUNTER — PATIENT MESSAGE (OUTPATIENT)
Dept: HEMATOLOGY/ONCOLOGY | Facility: CLINIC | Age: 55
End: 2020-04-17

## 2020-04-17 NOTE — TELEPHONE ENCOUNTER
Spoke to patient about visit with Dr. Welch on Monday at 11am, switched visit over to VV, patient confirmed

## 2020-04-20 ENCOUNTER — OFFICE VISIT (OUTPATIENT)
Dept: HEMATOLOGY/ONCOLOGY | Facility: CLINIC | Age: 55
End: 2020-04-20
Payer: COMMERCIAL

## 2020-04-20 DIAGNOSIS — Z85.038 HISTORY OF COLON CANCER: Primary | ICD-10-CM

## 2020-04-20 PROCEDURE — 99213 PR OFFICE/OUTPT VISIT, EST, LEVL III, 20-29 MIN: ICD-10-PCS | Mod: 95,,, | Performed by: INTERNAL MEDICINE

## 2020-04-20 PROCEDURE — 99213 OFFICE O/P EST LOW 20 MIN: CPT | Mod: 95,,, | Performed by: INTERNAL MEDICINE

## 2020-04-20 NOTE — PROGRESS NOTES
Subjective:      The patient location is: home  The chief complaint leading to consultation is: history of colon cancer  Visit type: audiovisual  Total time spent with patient: 20 minutes  Each patient to whom he or she provides medical services by telemedicine is:  (1) informed of the relationship between the physician and patient and the respective role of any other health care provider with respect to management of the patient; and (2) notified that he or she may decline to receive medical services by telemedicine and may withdraw from such care at any time.    Notes: see HPI       Patient ID: Irene Galo is a 54 y.o. female.    Chief Complaint: History of colon cancer  ONCOLOGIC HISTORY: Ms. Irene Galo is a 53-year-old female who underwent screening colonoscopy and was noted to have a circumferential partially obstructing mass in the rectosigmoid, which could not be traversed with the scope.  Inferior edge was 18 to 19 cm from the anal verge.  This was done on 01/10/2019.  Pathology from that revealed moderate dysplasia.  Malignancy could not be excluded.  She underwent a CT scan on 01/15/2019 that revealed a focal area of thickening in the rectosigmoid colon measuring 1.9 cm in length, which could possibly correspond to the abnormality seen on recent colonoscopy.  There is some  slight prominent lymph node superior to this region of thickening along the course of the inferior mesenteric vessels measuring approximately 6.5 mm.  CEA was 3.3.  She underwent sigmoid colectomy on 02/15/2019.  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            She completed 12 cycles of adjuvant chemo with FOLFOX as of 9/9/19, Last colonoscopy in 10/19 revealed 2 polyps, repeat recommended in 2 years    HPICT scans from 4/2020 reveal no evidence of recurrence. Her neuropathy is getting better.    She denies any nausea, vomiting, diarrhea, constipation, abdominal pain, weight loss or loss of appetite, chest  pain, shortness of breath, leg swelling, fatigue, pain, headache, dizziness, or mood changes. Her ECOG PS is zero She is by herself        Review of Systems   Constitutional: Negative for appetite change and unexpected weight change.   Eyes: Negative for visual disturbance.   Respiratory: Negative for cough and shortness of breath.    Cardiovascular: Negative for chest pain.   Gastrointestinal: Negative for abdominal pain and diarrhea.   Genitourinary: Negative for frequency.   Musculoskeletal: Negative for back pain.   Skin: Negative for rash.   Neurological: Negative for headaches.   Hematological: Negative for adenopathy.   Psychiatric/Behavioral: The patient is not nervous/anxious.        Objective:      Physical Exam      LABS:  WBC   Date Value Ref Range Status   04/14/2020 5.59 3.90 - 12.70 K/uL Final     Hemoglobin   Date Value Ref Range Status   04/14/2020 13.8 12.0 - 16.0 g/dL Final     Hematocrit   Date Value Ref Range Status   04/14/2020 42.7 37.0 - 48.5 % Final     Platelets   Date Value Ref Range Status   04/14/2020 187 150 - 350 K/uL Final     Gran # (ANC)   Date Value Ref Range Status   04/14/2020 3.0 1.8 - 7.7 K/uL Final     Comment:     The ANC is based on a white cell differential from an   automated cell counter. It has not been microscopically   reviewed for the presence of abnormal cells. Clinical   correlation is required.         Chemistry        Component Value Date/Time     04/14/2020 0915    K 4.2 04/14/2020 0915     04/14/2020 0915    CO2 28 04/14/2020 0915    BUN 11 04/14/2020 0915    CREATININE 0.8 04/14/2020 0915    GLU 95 04/14/2020 0915        Component Value Date/Time    CALCIUM 9.5 04/14/2020 0915    ALKPHOS 90 04/14/2020 0915    AST 20 04/14/2020 0915    ALT 18 04/14/2020 0915    BILITOT 0.4 04/14/2020 0915    ESTGFRAFRICA >60 04/14/2020 0915    EGFRNONAA >60 04/14/2020 0915        CEA: 1.4  Assessment:       1. History of colon cancer        Plan:        1, She is  doing well clinically with no evidence of recurrence on her CT scans. These were reviewed with her. She will return in 3 months with labs, will repeat CT scans in 6 months    Above care plan was discussed with patient and all questions were addressed to his satisfaction

## 2020-04-29 DIAGNOSIS — T45.1X5A NEUROPATHY DUE TO CHEMOTHERAPEUTIC DRUG: ICD-10-CM

## 2020-04-29 DIAGNOSIS — G62.0 NEUROPATHY DUE TO CHEMOTHERAPEUTIC DRUG: ICD-10-CM

## 2020-04-29 RX ORDER — PREGABALIN 150 MG/1
150 CAPSULE ORAL 2 TIMES DAILY
Qty: 60 CAPSULE | Refills: 5 | Status: SHIPPED | OUTPATIENT
Start: 2020-04-29 | End: 2020-10-25 | Stop reason: SDUPTHER

## 2020-05-05 ENCOUNTER — HOSPITAL ENCOUNTER (OUTPATIENT)
Dept: RADIOLOGY | Facility: HOSPITAL | Age: 55
Discharge: HOME | End: 2020-05-05
Attending: OBSTETRICS & GYNECOLOGY
Payer: COMMERCIAL

## 2020-05-05 VITALS — BODY MASS INDEX: 33.61 KG/M2 | HEIGHT: 61 IN | WEIGHT: 178 LBS

## 2020-05-05 DIAGNOSIS — Z12.39 SCREENING FOR BREAST CANCER: ICD-10-CM

## 2020-05-05 PROCEDURE — 77067 MAMMO DIGITAL SCREENING BILAT WITH TOMOSYNTHESIS_CAD: ICD-10-PCS | Mod: 26,,, | Performed by: RADIOLOGY

## 2020-05-05 PROCEDURE — 77067 SCR MAMMO BI INCL CAD: CPT | Mod: 26,,, | Performed by: RADIOLOGY

## 2020-05-05 PROCEDURE — 77067 SCR MAMMO BI INCL CAD: CPT | Mod: TC

## 2020-05-05 PROCEDURE — 77063 BREAST TOMOSYNTHESIS BI: CPT | Mod: 26,,, | Performed by: RADIOLOGY

## 2020-05-05 PROCEDURE — 77063 MAMMO DIGITAL SCREENING BILAT WITH TOMOSYNTHESIS_CAD: ICD-10-PCS | Mod: 26,,, | Performed by: RADIOLOGY

## 2020-07-17 ENCOUNTER — LAB VISIT (OUTPATIENT)
Dept: LAB | Facility: HOSPITAL | Age: 55
End: 2020-07-17
Attending: INTERNAL MEDICINE
Payer: COMMERCIAL

## 2020-07-17 DIAGNOSIS — Z85.038 HISTORY OF COLON CANCER: ICD-10-CM

## 2020-07-17 LAB
ALBUMIN SERPL BCP-MCNC: 3.7 G/DL (ref 3.5–5.2)
ALP SERPL-CCNC: 81 U/L (ref 55–135)
ALT SERPL W/O P-5'-P-CCNC: 24 U/L (ref 10–44)
ANION GAP SERPL CALC-SCNC: 6 MMOL/L (ref 8–16)
AST SERPL-CCNC: 23 U/L (ref 10–40)
BILIRUB SERPL-MCNC: 0.7 MG/DL (ref 0.1–1)
BUN SERPL-MCNC: 10 MG/DL (ref 6–20)
CALCIUM SERPL-MCNC: 9.4 MG/DL (ref 8.7–10.5)
CEA SERPL-MCNC: 1.1 NG/ML (ref 0–5)
CHLORIDE SERPL-SCNC: 108 MMOL/L (ref 95–110)
CO2 SERPL-SCNC: 29 MMOL/L (ref 23–29)
CREAT SERPL-MCNC: 0.8 MG/DL (ref 0.5–1.4)
ERYTHROCYTE [DISTWIDTH] IN BLOOD BY AUTOMATED COUNT: 14.5 % (ref 11.5–14.5)
EST. GFR  (AFRICAN AMERICAN): >60 ML/MIN/1.73 M^2
EST. GFR  (NON AFRICAN AMERICAN): >60 ML/MIN/1.73 M^2
GLUCOSE SERPL-MCNC: 89 MG/DL (ref 70–110)
HCT VFR BLD AUTO: 40.3 % (ref 37–48.5)
HGB BLD-MCNC: 13 G/DL (ref 12–16)
IMM GRANULOCYTES # BLD AUTO: 0.02 K/UL (ref 0–0.04)
MCH RBC QN AUTO: 29.6 PG (ref 27–31)
MCHC RBC AUTO-ENTMCNC: 32.3 G/DL (ref 32–36)
MCV RBC AUTO: 92 FL (ref 82–98)
NEUTROPHILS # BLD AUTO: 3.2 K/UL (ref 1.8–7.7)
PLATELET # BLD AUTO: 190 K/UL (ref 150–350)
PMV BLD AUTO: 11.5 FL (ref 9.2–12.9)
POTASSIUM SERPL-SCNC: 4.5 MMOL/L (ref 3.5–5.1)
PROT SERPL-MCNC: 6.8 G/DL (ref 6–8.4)
RBC # BLD AUTO: 4.39 M/UL (ref 4–5.4)
SODIUM SERPL-SCNC: 143 MMOL/L (ref 136–145)
WBC # BLD AUTO: 5.59 K/UL (ref 3.9–12.7)

## 2020-07-17 PROCEDURE — 36415 COLL VENOUS BLD VENIPUNCTURE: CPT

## 2020-07-17 PROCEDURE — 80053 COMPREHEN METABOLIC PANEL: CPT

## 2020-07-17 PROCEDURE — 85027 COMPLETE CBC AUTOMATED: CPT

## 2020-07-17 PROCEDURE — 82378 CARCINOEMBRYONIC ANTIGEN: CPT

## 2020-07-20 ENCOUNTER — OFFICE VISIT (OUTPATIENT)
Dept: HEMATOLOGY/ONCOLOGY | Facility: CLINIC | Age: 55
End: 2020-07-20
Payer: COMMERCIAL

## 2020-07-20 DIAGNOSIS — T45.1X5A NEUROPATHY DUE TO CHEMOTHERAPEUTIC DRUG: ICD-10-CM

## 2020-07-20 DIAGNOSIS — G62.0 NEUROPATHY DUE TO CHEMOTHERAPEUTIC DRUG: ICD-10-CM

## 2020-07-20 DIAGNOSIS — Z85.038 HISTORY OF COLON CANCER: Primary | ICD-10-CM

## 2020-07-20 PROCEDURE — 99213 OFFICE O/P EST LOW 20 MIN: CPT | Mod: 95,,, | Performed by: INTERNAL MEDICINE

## 2020-07-20 PROCEDURE — 99213 PR OFFICE/OUTPT VISIT, EST, LEVL III, 20-29 MIN: ICD-10-PCS | Mod: 95,,, | Performed by: INTERNAL MEDICINE

## 2020-07-20 NOTE — PROGRESS NOTES
Subjective:      The patient location is: home  The chief complaint leading to consultation is: history of colon cancer    Visit type: audiovisual    Face to Face time with patient: 15 minutes of total time spent on the encounter, which includes face to face time and non-face to face time preparing to see the patient (eg, review of tests), Obtaining and/or reviewing separately obtained history, Documenting clinical information in the electronic or other health record, Independently interpreting results (not separately reported) and communicating results to the patient/family/caregiver, or Care coordination (not separately reported).         Each patient to whom he or she provides medical services by telemedicine is:  (1) informed of the relationship between the physician and patient and the respective role of any other health care provider with respect to management of the patient; and (2) notified that he or she may decline to receive medical services by telemedicine and may withdraw from such care at any time.    Notes:    Patient ID: Irene Galo is a 54 y.o. female.    Chief Complaint: History of colon cancer  ONCOLOGIC HISTORY: Ms. Irene Galo is a 53-year-old female who underwent screening colonoscopy and was noted to have a circumferential partially obstructing mass in the rectosigmoid, which could not be traversed with the scope.  Inferior edge was 18 to 19 cm from the anal verge.  This was done on 01/10/2019.  Pathology from that revealed moderate dysplasia.  Malignancy could not be excluded.  She underwent a CT scan on 01/15/2019 that revealed a focal area of thickening in the rectosigmoid colon measuring 1.9 cm in length, which could possibly correspond to the abnormality seen on recent colonoscopy.  There is some  slight prominent lymph node superior to this region of thickening along the course of the inferior mesenteric vessels measuring approximately 6.5 mm.  CEA was 3.3.  She underwent  sigmoid colectomy on 02/15/2019.  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            She completed 12 cycles of adjuvant chemo with FOLFOX as of 9/9/19, Last colonoscopy in 10/19 revealed 2 polyps, repeat recommended in 2 years       HPI She comes on to review her labs. She feels well and denies any new issues.  Feels like neuropathy is improving.          Review of Systems   Constitutional: Negative for appetite change and unexpected weight change.   Eyes: Positive for visual disturbance.   Respiratory: Negative for cough and shortness of breath.    Cardiovascular: Negative for chest pain.   Gastrointestinal: Negative for abdominal pain and diarrhea.   Genitourinary: Negative for frequency.   Musculoskeletal: Negative for back pain.   Integumentary:  Negative for rash.   Neurological: Negative for headaches.   Hematological: Negative for adenopathy.   Psychiatric/Behavioral: The patient is not nervous/anxious.          Objective:      Physical Exam      LABS:  WBC   Date Value Ref Range Status   07/17/2020 5.59 3.90 - 12.70 K/uL Final     Hemoglobin   Date Value Ref Range Status   07/17/2020 13.0 12.0 - 16.0 g/dL Final     Hematocrit   Date Value Ref Range Status   07/17/2020 40.3 37.0 - 48.5 % Final     Platelets   Date Value Ref Range Status   07/17/2020 190 150 - 350 K/uL Final     Gran # (ANC)   Date Value Ref Range Status   07/17/2020 3.2 1.8 - 7.7 K/uL Final     Comment:     The ANC is based on a white cell differential from an   automated cell counter. It has not been microscopically   reviewed for the presence of abnormal cells. Clinical   correlation is required.         Chemistry        Component Value Date/Time     07/17/2020 1029    K 4.5 07/17/2020 1029     07/17/2020 1029    CO2 29 07/17/2020 1029    BUN 10 07/17/2020 1029    CREATININE 0.8 07/17/2020 1029    GLU 89 07/17/2020 1029        Component Value Date/Time    CALCIUM 9.4 07/17/2020 1029    ALKPHOS 81 07/17/2020 1029     AST 23 07/17/2020 1029    ALT 24 07/17/2020 1029    BILITOT 0.7 07/17/2020 1029    ESTGFRAFRICA >60 07/17/2020 1029    EGFRNONAA >60 07/17/2020 1029        CEA: 1.1      Assessment:       1. History of colon cancer    2. Neuropathy due to chemotherapeutic drug        Plan:       \   1. She is doing well with no evidence of recurrence and will return in 3 months with labs and CT scans  2. She will continue with Lyrica but notes that her symptoms are getting better    Above care plan was discussed with patient and all questions were addressed to her satisfaction

## 2020-08-24 DIAGNOSIS — K21.9 GASTROESOPHAGEAL REFLUX DISEASE, ESOPHAGITIS PRESENCE NOT SPECIFIED: ICD-10-CM

## 2020-08-25 RX ORDER — OMEPRAZOLE 40 MG/1
40 CAPSULE, DELAYED RELEASE ORAL DAILY
Qty: 60 CAPSULE | Refills: 11 | Status: SHIPPED | OUTPATIENT
Start: 2020-08-25 | End: 2021-09-20 | Stop reason: SDUPTHER

## 2020-10-19 ENCOUNTER — HOSPITAL ENCOUNTER (OUTPATIENT)
Dept: RADIOLOGY | Facility: HOSPITAL | Age: 55
Discharge: HOME | End: 2020-10-19
Attending: INTERNAL MEDICINE
Payer: COMMERCIAL

## 2020-10-19 DIAGNOSIS — Z85.038 HISTORY OF COLON CANCER: ICD-10-CM

## 2020-10-19 PROCEDURE — 25500020 PHARM REV CODE 255: Performed by: INTERNAL MEDICINE

## 2020-10-19 PROCEDURE — 74177 CT ABD & PELVIS W/CONTRAST: CPT | Mod: TC

## 2020-10-19 RX ADMIN — IOHEXOL 30 ML: 350 INJECTION, SOLUTION INTRAVENOUS at 11:10

## 2020-10-19 RX ADMIN — IOHEXOL 75 ML: 350 INJECTION, SOLUTION INTRAVENOUS at 11:10

## 2020-10-21 ENCOUNTER — OFFICE VISIT (OUTPATIENT)
Dept: HEMATOLOGY/ONCOLOGY | Facility: CLINIC | Age: 55
End: 2020-10-21
Payer: COMMERCIAL

## 2020-10-21 VITALS
WEIGHT: 178.81 LBS | OXYGEN SATURATION: 98 % | DIASTOLIC BLOOD PRESSURE: 70 MMHG | HEART RATE: 62 BPM | HEIGHT: 62 IN | SYSTOLIC BLOOD PRESSURE: 125 MMHG | BODY MASS INDEX: 32.91 KG/M2 | TEMPERATURE: 98 F

## 2020-10-21 DIAGNOSIS — Z85.038 HISTORY OF COLON CANCER: Primary | ICD-10-CM

## 2020-10-21 PROCEDURE — 3074F PR MOST RECENT SYSTOLIC BLOOD PRESSURE < 130 MM HG: ICD-10-PCS | Mod: CPTII,S$GLB,, | Performed by: INTERNAL MEDICINE

## 2020-10-21 PROCEDURE — 99999 PR PBB SHADOW E&M-EST. PATIENT-LVL III: ICD-10-PCS | Mod: PBBFAC,,, | Performed by: INTERNAL MEDICINE

## 2020-10-21 PROCEDURE — 3008F BODY MASS INDEX DOCD: CPT | Mod: CPTII,S$GLB,, | Performed by: INTERNAL MEDICINE

## 2020-10-21 PROCEDURE — 3078F DIAST BP <80 MM HG: CPT | Mod: CPTII,S$GLB,, | Performed by: INTERNAL MEDICINE

## 2020-10-21 PROCEDURE — 3078F PR MOST RECENT DIASTOLIC BLOOD PRESSURE < 80 MM HG: ICD-10-PCS | Mod: CPTII,S$GLB,, | Performed by: INTERNAL MEDICINE

## 2020-10-21 PROCEDURE — 3008F PR BODY MASS INDEX (BMI) DOCUMENTED: ICD-10-PCS | Mod: CPTII,S$GLB,, | Performed by: INTERNAL MEDICINE

## 2020-10-21 PROCEDURE — 99214 PR OFFICE/OUTPT VISIT, EST, LEVL IV, 30-39 MIN: ICD-10-PCS | Mod: S$GLB,,, | Performed by: INTERNAL MEDICINE

## 2020-10-21 PROCEDURE — 3074F SYST BP LT 130 MM HG: CPT | Mod: CPTII,S$GLB,, | Performed by: INTERNAL MEDICINE

## 2020-10-21 PROCEDURE — 99999 PR PBB SHADOW E&M-EST. PATIENT-LVL III: CPT | Mod: PBBFAC,,, | Performed by: INTERNAL MEDICINE

## 2020-10-21 PROCEDURE — 99214 OFFICE O/P EST MOD 30 MIN: CPT | Mod: S$GLB,,, | Performed by: INTERNAL MEDICINE

## 2020-10-21 NOTE — PROGRESS NOTES
"Subjective:       Patient ID: Irene Galo is a 54 y.o. female.    Chief Complaint: Results and History of colon cancer  ONCOLOGIC HISTORY: Ms. Irene Galo is a 53-year-old female who underwent screening colonoscopy and was noted to have a circumferential partially obstructing mass in the rectosigmoid, which could not be traversed with the scope.  Inferior edge was 18 to 19 cm from the anal verge.  This was done on 01/10/2019.  Pathology from that revealed moderate dysplasia.  Malignancy could not be excluded.  She underwent a CT scan on 01/15/2019 that revealed a focal area of thickening in the rectosigmoid colon measuring 1.9 cm in length, which could possibly correspond to the abnormality seen on recent colonoscopy.  There is some  slight prominent lymph node superior to this region of thickening along the course of the inferior mesenteric vessels measuring approximately 6.5 mm.  CEA was 3.3.  She underwent sigmoid colectomy on 02/15/2019.  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            She completed 12 cycles of adjuvant chemo with FOLFOX as of 9/9/19, Last colonoscopy in 10/19 revealed 2 polyps, repeat recommended in 2 years         HPI She comes on to review her labs and CT scans from 10/19/2020 reveals "Post removal of the left-sided MediPort catheter. Resolution of patient's region of increased density about the aortic arch with return to normal parameters. Postop changes of previous colonic resection with reanastomosis at the rectosigmoid junction without evidence of breakdown of the postoperative changes. Scattered nodes throughout the central mesentery and retroperitoneum there is stable appearing. Resolution of the patient's right basilar atelectasis.  She feels well and denies any new issues    Review of Systems   Constitutional: Negative for appetite change, fatigue and unexpected weight change.   HENT: Negative for mouth sores.    Eyes: Negative for visual disturbance. "   Respiratory: Negative for cough and shortness of breath.    Cardiovascular: Negative for chest pain.   Gastrointestinal: Negative for abdominal pain and diarrhea.   Genitourinary: Negative for frequency.   Musculoskeletal: Negative for back pain.   Integumentary:  Negative for rash.   Neurological: Negative for headaches.   Hematological: Negative for adenopathy.   Psychiatric/Behavioral: The patient is not nervous/anxious.    All other systems reviewed and are negative.        Objective:      Physical Exam  Vitals signs reviewed.   Constitutional:       Appearance: She is well-developed.   HENT:      Mouth/Throat:      Pharynx: No oropharyngeal exudate.   Cardiovascular:      Rate and Rhythm: Normal rate.      Heart sounds: Normal heart sounds.   Pulmonary:      Effort: Pulmonary effort is normal.      Breath sounds: Normal breath sounds. No wheezing.   Abdominal:      General: Bowel sounds are normal.      Palpations: Abdomen is soft.      Tenderness: There is no abdominal tenderness.   Musculoskeletal:         General: No tenderness.   Lymphadenopathy:      Cervical: No cervical adenopathy.   Skin:     General: Skin is warm and dry.      Findings: No rash.   Neurological:      Mental Status: She is alert and oriented to person, place, and time.      Coordination: Coordination normal.   Psychiatric:         Thought Content: Thought content normal.         Judgment: Judgment normal.           LABS:  WBC   Date Value Ref Range Status   10/19/2020 7.89 3.90 - 12.70 K/uL Final     Hemoglobin   Date Value Ref Range Status   10/19/2020 13.4 12.0 - 16.0 g/dL Final     Hematocrit   Date Value Ref Range Status   10/19/2020 42.5 37.0 - 48.5 % Final     Platelets   Date Value Ref Range Status   10/19/2020 185 150 - 350 K/uL Final     Gran # (ANC)   Date Value Ref Range Status   10/19/2020 5.2 1.8 - 7.7 K/uL Final     Comment:     The ANC is based on a white cell differential from an   automated cell counter. It has not  been microscopically   reviewed for the presence of abnormal cells. Clinical   correlation is required.         Chemistry        Component Value Date/Time     10/19/2020 1010    K 4.5 10/19/2020 1010     10/19/2020 1010    CO2 27 10/19/2020 1010    BUN 16 10/19/2020 1010    CREATININE 0.7 10/19/2020 1010    GLU 91 10/19/2020 1010        Component Value Date/Time    CALCIUM 9.4 10/19/2020 1010    ALKPHOS 78 10/19/2020 1010    AST 23 10/19/2020 1010    ALT 18 10/19/2020 1010    BILITOT 0.4 10/19/2020 1010    ESTGFRAFRICA >60 10/19/2020 1010    EGFRNONAA >60 10/19/2020 1010          Assessment:       1. History of colon cancer        Plan:        1. She is doing well with no evidence of recurrence on her CT scans and will return in 3 months with CEA and in 6 months with CT scans.       Above care plan was discussed with patient and all questions were addressed to her satisfaction

## 2020-10-25 DIAGNOSIS — G62.0 NEUROPATHY DUE TO CHEMOTHERAPEUTIC DRUG: ICD-10-CM

## 2020-10-25 DIAGNOSIS — T45.1X5A NEUROPATHY DUE TO CHEMOTHERAPEUTIC DRUG: ICD-10-CM

## 2020-10-26 RX ORDER — PREGABALIN 150 MG/1
150 CAPSULE ORAL 2 TIMES DAILY
Qty: 60 CAPSULE | Refills: 5 | Status: SHIPPED | OUTPATIENT
Start: 2020-10-26 | End: 2021-04-26 | Stop reason: SDUPTHER

## 2021-01-24 NOTE — H&P
Procedure : Colonoscopy    Indication(s):  personal history of colon cancer    Review of patient's allergies indicates:   Allergen Reactions    No known drug allergies        Past Medical History:   Diagnosis Date    Allergy     Bilateral hearing loss     Colon cancer     GERD (gastroesophageal reflux disease)     Uterine fibroid        Prior to Admission medications    Medication Sig Start Date End Date Taking? Authorizing Provider   cyanocobalamin (VITAMIN B-12) 1000 MCG tablet Take 1 tablet (1,000 mcg total) by mouth once daily. 10/21/19  Yes Sheila Welch MD   LO LOESTRIN FE 1 mg-10 mcg (24)/10 mcg (2) Tab Take 1 tablet by mouth once daily. 3/19/19  Yes Disha Benoit MD   loratadine (CLARITIN) 10 mg tablet Take 10 mg by mouth once daily.   Yes Historical Provider, MD   multivitamin with minerals tablet Take 1 tablet by mouth once daily.   Yes Historical Provider, MD   omeprazole (PRILOSEC) 40 MG capsule Take 1 capsule (40 mg total) by mouth once daily. 8/12/19 8/11/20 Yes Sheila Welch MD   ondansetron (ZOFRAN-ODT) 8 MG TbDL Take 1 tablet (8 mg total) by mouth every 6 (six) hours as needed. 3/22/19 3/21/20 Yes Sheila Welch MD   pregabalin (LYRICA) 150 MG capsule Take 1 capsule (150 mg total) by mouth 2 (two) times daily. 10/30/19 4/29/20 Yes Juan Lamb MD   pyridoxine, vitamin B6, (B-6) 100 MG Tab Take 4 tablets (400 mg total) by mouth once daily. 10/21/19  Yes Sheila Welch MD   bisacodyl (DULCOLAX) 5 mg EC tablet Take 4 tablets (20 mg total) by mouth once. Per Dr. Augustin instruction sheet for 1 dose 10/30/19 10/30/19  Zachery Augustin MD   ibuprofen (ADVIL) 200 MG tablet Take 200 mg by mouth every 6 (six) hours as needed for Pain.    Historical Provider, MD   promethazine (PHENERGAN) 25 MG tablet Take 25 mg by mouth every 4 (four) hours.    Historical Provider, MD   sodium,potassium,mag sulfates (SUPREP BOWEL PREP KIT) 17.5-3.13-1.6 gram SolR Take 354 mLs by mouth once. Per Dr. Augustin  instruction sheet for 1 dose 10/30/19 10/30/19  Zachery Augustin MD       Sedation Problems: NO    Family History   Problem Relation Age of Onset    Diverticulitis Father     Lung cancer Father     Breast cancer Paternal Grandmother 70    Heart disease Brother 45        Tobacco smoker     Diabetes Brother     Diabetes Brother        Fam Hx of Sedation Problems: NO    Social History     Socioeconomic History    Marital status:      Spouse name: Not on file    Number of children: Not on file    Years of education: Not on file    Highest education level: Not on file   Occupational History    Not on file   Social Needs    Financial resource strain: Not hard at all    Food insecurity:     Worry: Never true     Inability: Never true    Transportation needs:     Medical: No     Non-medical: No   Tobacco Use    Smoking status: Never Smoker    Smokeless tobacco: Never Used   Substance and Sexual Activity    Alcohol use: Yes     Frequency: 2-4 times a month     Drinks per session: 1 or 2     Binge frequency: Never     Comment: 1 glass of wine , 2-3 times a week    Drug use: No    Sexual activity: Yes     Partners: Male     Birth control/protection: Other-see comments, OCP     Comment: -- with vasectomy   Lifestyle    Physical activity:     Days per week: 0 days     Minutes per session: 0 min    Stress: Not at all   Relationships    Social connections:     Talks on phone: Three times a week     Gets together: Twice a week     Attends Hindu service: Not on file     Active member of club or organization: Yes     Attends meetings of clubs or organizations: More than 4 times per year     Relationship status:    Other Topics Concern    Not on file   Social History Narrative    Not on file       Review of Systems -     Respiratory ROS: no cough, shortness of breath, or wheezing  Cardiovascular ROS: no chest pain or dyspnea on exertion  Gastrointestinal ROS: no abdominal pain,  change in bowel habits, or black or bloody stools  Musculoskeletal ROS: negative  Neurological ROS: no TIA or stroke symptoms        Physical Exam:  General: no distress  Head: normocephalic  Airway:  normal oropharynx, airway normal  Neck: supple, symmetrical, trachea midline  Lungs:  normal respiratory effort  Heart: regular rate and rhythm  Abdomen: soft, non-tender non-distented; bowel sounds normal; no masses,  no organomegaly  Extremities: no cyanosis or edema, or clubbing       Deep Sedation: Mallampati Score per anesthesia     SedationPlan :Moderate     ASA : II    Patient is medically cleared for anesthesia.    Anesthesia/Surgery risks, benefits and alternative options discussed and understood by patient/family.     acute illness; agitation

## 2021-02-17 ENCOUNTER — PATIENT MESSAGE (OUTPATIENT)
Dept: HEMATOLOGY/ONCOLOGY | Facility: CLINIC | Age: 56
End: 2021-02-17

## 2021-02-17 ENCOUNTER — LAB VISIT (OUTPATIENT)
Dept: LAB | Facility: HOSPITAL | Age: 56
End: 2021-02-17
Attending: INTERNAL MEDICINE
Payer: COMMERCIAL

## 2021-02-17 ENCOUNTER — OFFICE VISIT (OUTPATIENT)
Dept: HEMATOLOGY/ONCOLOGY | Facility: CLINIC | Age: 56
End: 2021-02-17
Payer: COMMERCIAL

## 2021-02-17 DIAGNOSIS — Z85.038 HISTORY OF COLON CANCER: Primary | ICD-10-CM

## 2021-02-17 DIAGNOSIS — Z85.038 HISTORY OF COLON CANCER: ICD-10-CM

## 2021-02-17 LAB
ALBUMIN SERPL BCP-MCNC: 3.7 G/DL (ref 3.5–5.2)
ALP SERPL-CCNC: 74 U/L (ref 55–135)
ALT SERPL W/O P-5'-P-CCNC: 16 U/L (ref 10–44)
ANION GAP SERPL CALC-SCNC: 7 MMOL/L (ref 8–16)
AST SERPL-CCNC: 18 U/L (ref 10–40)
BILIRUB SERPL-MCNC: 0.4 MG/DL (ref 0.1–1)
BUN SERPL-MCNC: 14 MG/DL (ref 6–20)
CALCIUM SERPL-MCNC: 9.2 MG/DL (ref 8.7–10.5)
CEA SERPL-MCNC: 1.1 NG/ML (ref 0–5)
CHLORIDE SERPL-SCNC: 107 MMOL/L (ref 95–110)
CO2 SERPL-SCNC: 28 MMOL/L (ref 23–29)
CREAT SERPL-MCNC: 0.8 MG/DL (ref 0.5–1.4)
ERYTHROCYTE [DISTWIDTH] IN BLOOD BY AUTOMATED COUNT: 14.4 % (ref 11.5–14.5)
EST. GFR  (AFRICAN AMERICAN): >60 ML/MIN/1.73 M^2
EST. GFR  (NON AFRICAN AMERICAN): >60 ML/MIN/1.73 M^2
GLUCOSE SERPL-MCNC: 105 MG/DL (ref 70–110)
HCT VFR BLD AUTO: 43.3 % (ref 37–48.5)
HGB BLD-MCNC: 13.8 G/DL (ref 12–16)
IMM GRANULOCYTES # BLD AUTO: 0.01 K/UL (ref 0–0.04)
MCH RBC QN AUTO: 28.9 PG (ref 27–31)
MCHC RBC AUTO-ENTMCNC: 31.9 G/DL (ref 32–36)
MCV RBC AUTO: 91 FL (ref 82–98)
NEUTROPHILS # BLD AUTO: 3.4 K/UL (ref 1.8–7.7)
PLATELET # BLD AUTO: 193 K/UL (ref 150–350)
PMV BLD AUTO: 11.8 FL (ref 9.2–12.9)
POTASSIUM SERPL-SCNC: 4.2 MMOL/L (ref 3.5–5.1)
PROT SERPL-MCNC: 6.9 G/DL (ref 6–8.4)
RBC # BLD AUTO: 4.77 M/UL (ref 4–5.4)
SODIUM SERPL-SCNC: 142 MMOL/L (ref 136–145)
WBC # BLD AUTO: 5.94 K/UL (ref 3.9–12.7)

## 2021-02-17 PROCEDURE — 99212 OFFICE O/P EST SF 10 MIN: CPT | Mod: 95,,, | Performed by: INTERNAL MEDICINE

## 2021-02-17 PROCEDURE — 80053 COMPREHEN METABOLIC PANEL: CPT

## 2021-02-17 PROCEDURE — 82378 CARCINOEMBRYONIC ANTIGEN: CPT

## 2021-02-17 PROCEDURE — 99212 PR OFFICE/OUTPT VISIT, EST, LEVL II, 10-19 MIN: ICD-10-PCS | Mod: 95,,, | Performed by: INTERNAL MEDICINE

## 2021-02-17 PROCEDURE — 85027 COMPLETE CBC AUTOMATED: CPT

## 2021-02-17 PROCEDURE — 36415 COLL VENOUS BLD VENIPUNCTURE: CPT

## 2021-03-16 ENCOUNTER — PATIENT OUTREACH (OUTPATIENT)
Dept: ADMINISTRATIVE | Facility: OTHER | Age: 56
End: 2021-03-16

## 2021-03-18 ENCOUNTER — OFFICE VISIT (OUTPATIENT)
Dept: OBSTETRICS AND GYNECOLOGY | Facility: CLINIC | Age: 56
End: 2021-03-18
Payer: COMMERCIAL

## 2021-03-18 VITALS
SYSTOLIC BLOOD PRESSURE: 118 MMHG | DIASTOLIC BLOOD PRESSURE: 82 MMHG | RESPIRATION RATE: 13 BRPM | WEIGHT: 179 LBS | HEIGHT: 62 IN | BODY MASS INDEX: 32.94 KG/M2 | HEART RATE: 63 BPM

## 2021-03-18 DIAGNOSIS — Z12.31 ENCOUNTER FOR SCREENING MAMMOGRAM FOR MALIGNANT NEOPLASM OF BREAST: ICD-10-CM

## 2021-03-18 DIAGNOSIS — Z01.419 WELL WOMAN EXAM WITH ROUTINE GYNECOLOGICAL EXAM: Primary | ICD-10-CM

## 2021-03-18 DIAGNOSIS — C18.9 MALIGNANT NEOPLASM OF COLON, UNSPECIFIED PART OF COLON: ICD-10-CM

## 2021-03-18 PROCEDURE — 3074F PR MOST RECENT SYSTOLIC BLOOD PRESSURE < 130 MM HG: ICD-10-PCS | Mod: CPTII,S$GLB,, | Performed by: OBSTETRICS & GYNECOLOGY

## 2021-03-18 PROCEDURE — 99999 PR PBB SHADOW E&M-EST. PATIENT-LVL III: CPT | Mod: PBBFAC,,, | Performed by: OBSTETRICS & GYNECOLOGY

## 2021-03-18 PROCEDURE — 3008F PR BODY MASS INDEX (BMI) DOCUMENTED: ICD-10-PCS | Mod: CPTII,S$GLB,, | Performed by: OBSTETRICS & GYNECOLOGY

## 2021-03-18 PROCEDURE — 3008F BODY MASS INDEX DOCD: CPT | Mod: CPTII,S$GLB,, | Performed by: OBSTETRICS & GYNECOLOGY

## 2021-03-18 PROCEDURE — 99396 PREV VISIT EST AGE 40-64: CPT | Mod: S$GLB,,, | Performed by: OBSTETRICS & GYNECOLOGY

## 2021-03-18 PROCEDURE — 1126F AMNT PAIN NOTED NONE PRSNT: CPT | Mod: S$GLB,,, | Performed by: OBSTETRICS & GYNECOLOGY

## 2021-03-18 PROCEDURE — 1126F PR PAIN SEVERITY QUANTIFIED, NO PAIN PRESENT: ICD-10-PCS | Mod: S$GLB,,, | Performed by: OBSTETRICS & GYNECOLOGY

## 2021-03-18 PROCEDURE — 3079F DIAST BP 80-89 MM HG: CPT | Mod: CPTII,S$GLB,, | Performed by: OBSTETRICS & GYNECOLOGY

## 2021-03-18 PROCEDURE — 3074F SYST BP LT 130 MM HG: CPT | Mod: CPTII,S$GLB,, | Performed by: OBSTETRICS & GYNECOLOGY

## 2021-03-18 PROCEDURE — 3079F PR MOST RECENT DIASTOLIC BLOOD PRESSURE 80-89 MM HG: ICD-10-PCS | Mod: CPTII,S$GLB,, | Performed by: OBSTETRICS & GYNECOLOGY

## 2021-03-18 PROCEDURE — 99396 PR PREVENTIVE VISIT,EST,40-64: ICD-10-PCS | Mod: S$GLB,,, | Performed by: OBSTETRICS & GYNECOLOGY

## 2021-03-18 PROCEDURE — 99999 PR PBB SHADOW E&M-EST. PATIENT-LVL III: ICD-10-PCS | Mod: PBBFAC,,, | Performed by: OBSTETRICS & GYNECOLOGY

## 2021-04-05 ENCOUNTER — PATIENT MESSAGE (OUTPATIENT)
Dept: ADMINISTRATIVE | Facility: HOSPITAL | Age: 56
End: 2021-04-05

## 2021-04-23 DIAGNOSIS — G62.0 NEUROPATHY DUE TO CHEMOTHERAPEUTIC DRUG: ICD-10-CM

## 2021-04-23 DIAGNOSIS — T45.1X5A NEUROPATHY DUE TO CHEMOTHERAPEUTIC DRUG: ICD-10-CM

## 2021-04-23 RX ORDER — PREGABALIN 150 MG/1
150 CAPSULE ORAL 2 TIMES DAILY
Qty: 60 CAPSULE | Refills: 5 | OUTPATIENT
Start: 2021-04-23 | End: 2021-10-22

## 2021-04-26 ENCOUNTER — TELEPHONE (OUTPATIENT)
Dept: INTERNAL MEDICINE | Facility: CLINIC | Age: 56
End: 2021-04-26

## 2021-04-26 DIAGNOSIS — T45.1X5A NEUROPATHY DUE TO CHEMOTHERAPEUTIC DRUG: ICD-10-CM

## 2021-04-26 DIAGNOSIS — G62.0 NEUROPATHY DUE TO CHEMOTHERAPEUTIC DRUG: ICD-10-CM

## 2021-04-26 RX ORDER — PREGABALIN 150 MG/1
150 CAPSULE ORAL 2 TIMES DAILY
Qty: 60 CAPSULE | Refills: 0 | Status: SHIPPED | OUTPATIENT
Start: 2021-04-26 | End: 2021-05-23 | Stop reason: SDUPTHER

## 2021-04-29 ENCOUNTER — OFFICE VISIT (OUTPATIENT)
Dept: INTERNAL MEDICINE | Facility: CLINIC | Age: 56
End: 2021-04-29
Payer: COMMERCIAL

## 2021-04-29 VITALS
RESPIRATION RATE: 14 BRPM | BODY MASS INDEX: 31.2 KG/M2 | HEIGHT: 62 IN | HEART RATE: 76 BPM | WEIGHT: 169.56 LBS | DIASTOLIC BLOOD PRESSURE: 72 MMHG | SYSTOLIC BLOOD PRESSURE: 116 MMHG

## 2021-04-29 DIAGNOSIS — T45.1X5A NEUROPATHY DUE TO CHEMOTHERAPEUTIC DRUG: ICD-10-CM

## 2021-04-29 DIAGNOSIS — Z00.00 WELLNESS EXAMINATION: Primary | ICD-10-CM

## 2021-04-29 DIAGNOSIS — G62.0 NEUROPATHY DUE TO CHEMOTHERAPEUTIC DRUG: ICD-10-CM

## 2021-04-29 PROCEDURE — 99999 PR PBB SHADOW E&M-EST. PATIENT-LVL III: ICD-10-PCS | Mod: PBBFAC,,, | Performed by: INTERNAL MEDICINE

## 2021-04-29 PROCEDURE — 3008F PR BODY MASS INDEX (BMI) DOCUMENTED: ICD-10-PCS | Mod: CPTII,S$GLB,, | Performed by: INTERNAL MEDICINE

## 2021-04-29 PROCEDURE — 1126F PR PAIN SEVERITY QUANTIFIED, NO PAIN PRESENT: ICD-10-PCS | Mod: S$GLB,,, | Performed by: INTERNAL MEDICINE

## 2021-04-29 PROCEDURE — 99213 OFFICE O/P EST LOW 20 MIN: CPT | Mod: S$GLB,,, | Performed by: INTERNAL MEDICINE

## 2021-04-29 PROCEDURE — 1126F AMNT PAIN NOTED NONE PRSNT: CPT | Mod: S$GLB,,, | Performed by: INTERNAL MEDICINE

## 2021-04-29 PROCEDURE — 3008F BODY MASS INDEX DOCD: CPT | Mod: CPTII,S$GLB,, | Performed by: INTERNAL MEDICINE

## 2021-04-29 PROCEDURE — 99999 PR PBB SHADOW E&M-EST. PATIENT-LVL III: CPT | Mod: PBBFAC,,, | Performed by: INTERNAL MEDICINE

## 2021-04-29 PROCEDURE — 99213 PR OFFICE/OUTPT VISIT, EST, LEVL III, 20-29 MIN: ICD-10-PCS | Mod: S$GLB,,, | Performed by: INTERNAL MEDICINE

## 2021-05-07 ENCOUNTER — HOSPITAL ENCOUNTER (OUTPATIENT)
Dept: RADIOLOGY | Facility: HOSPITAL | Age: 56
Discharge: HOME | End: 2021-05-07
Attending: OBSTETRICS & GYNECOLOGY
Payer: COMMERCIAL

## 2021-05-07 VITALS — WEIGHT: 170 LBS | HEIGHT: 62 IN | BODY MASS INDEX: 31.28 KG/M2

## 2021-05-07 DIAGNOSIS — Z12.31 ENCOUNTER FOR SCREENING MAMMOGRAM FOR MALIGNANT NEOPLASM OF BREAST: ICD-10-CM

## 2021-05-07 PROCEDURE — 77063 MAMMO DIGITAL SCREENING BILAT WITH TOMO: ICD-10-PCS | Mod: 26,,, | Performed by: RADIOLOGY

## 2021-05-07 PROCEDURE — 77067 SCR MAMMO BI INCL CAD: CPT | Mod: 26,,, | Performed by: RADIOLOGY

## 2021-05-07 PROCEDURE — 77063 BREAST TOMOSYNTHESIS BI: CPT | Mod: 26,,, | Performed by: RADIOLOGY

## 2021-05-07 PROCEDURE — 77067 SCR MAMMO BI INCL CAD: CPT | Mod: TC

## 2021-05-07 PROCEDURE — 77067 MAMMO DIGITAL SCREENING BILAT WITH TOMO: ICD-10-PCS | Mod: 26,,, | Performed by: RADIOLOGY

## 2021-05-14 ENCOUNTER — HOSPITAL ENCOUNTER (OUTPATIENT)
Dept: RADIOLOGY | Facility: HOSPITAL | Age: 56
Discharge: HOME | End: 2021-05-14
Attending: INTERNAL MEDICINE
Payer: COMMERCIAL

## 2021-05-14 DIAGNOSIS — Z85.038 HISTORY OF COLON CANCER: ICD-10-CM

## 2021-05-14 PROCEDURE — 71260 CT CHEST ABDOMEN PELVIS WITH CONTRAST (XPD): ICD-10-PCS | Mod: 26,,, | Performed by: RADIOLOGY

## 2021-05-14 PROCEDURE — 71260 CT THORAX DX C+: CPT | Mod: 26,,, | Performed by: RADIOLOGY

## 2021-05-14 PROCEDURE — 71260 CT THORAX DX C+: CPT | Mod: TC

## 2021-05-14 PROCEDURE — 25500020 PHARM REV CODE 255: Performed by: INTERNAL MEDICINE

## 2021-05-14 PROCEDURE — 74177 CT ABD & PELVIS W/CONTRAST: CPT | Mod: TC

## 2021-05-14 PROCEDURE — 74177 CT ABD & PELVIS W/CONTRAST: CPT | Mod: 26,,, | Performed by: RADIOLOGY

## 2021-05-14 PROCEDURE — 74177 CT CHEST ABDOMEN PELVIS WITH CONTRAST (XPD): ICD-10-PCS | Mod: 26,,, | Performed by: RADIOLOGY

## 2021-05-14 RX ADMIN — IOHEXOL 50 ML: 350 INJECTION, SOLUTION INTRAVENOUS at 10:05

## 2021-05-14 RX ADMIN — IOHEXOL 75 ML: 350 INJECTION, SOLUTION INTRAVENOUS at 10:05

## 2021-05-16 ENCOUNTER — PATIENT MESSAGE (OUTPATIENT)
Dept: HEMATOLOGY/ONCOLOGY | Facility: CLINIC | Age: 56
End: 2021-05-16

## 2021-05-17 ENCOUNTER — OFFICE VISIT (OUTPATIENT)
Dept: HEMATOLOGY/ONCOLOGY | Facility: CLINIC | Age: 56
End: 2021-05-17
Payer: COMMERCIAL

## 2021-05-17 DIAGNOSIS — Z85.038 PERSONAL HISTORY OF COLON CANCER: Primary | ICD-10-CM

## 2021-05-17 DIAGNOSIS — T45.1X5A NEUROPATHY DUE TO CHEMOTHERAPEUTIC DRUG: ICD-10-CM

## 2021-05-17 DIAGNOSIS — G62.0 NEUROPATHY DUE TO CHEMOTHERAPEUTIC DRUG: ICD-10-CM

## 2021-05-17 DIAGNOSIS — Z85.038 HISTORY OF COLON CANCER: Primary | ICD-10-CM

## 2021-05-17 PROCEDURE — 99214 PR OFFICE/OUTPT VISIT, EST, LEVL IV, 30-39 MIN: ICD-10-PCS | Mod: 95,,, | Performed by: INTERNAL MEDICINE

## 2021-05-17 PROCEDURE — 99214 OFFICE O/P EST MOD 30 MIN: CPT | Mod: 95,,, | Performed by: INTERNAL MEDICINE

## 2021-05-23 DIAGNOSIS — G62.0 NEUROPATHY DUE TO CHEMOTHERAPEUTIC DRUG: ICD-10-CM

## 2021-05-23 DIAGNOSIS — T45.1X5A NEUROPATHY DUE TO CHEMOTHERAPEUTIC DRUG: ICD-10-CM

## 2021-05-24 RX ORDER — PREGABALIN 150 MG/1
150 CAPSULE ORAL 2 TIMES DAILY
Qty: 60 CAPSULE | Refills: 3 | Status: SHIPPED | OUTPATIENT
Start: 2021-05-24 | End: 2021-09-20 | Stop reason: SDUPTHER

## 2021-06-16 ENCOUNTER — HOSPITAL ENCOUNTER (OUTPATIENT)
Dept: RADIOLOGY | Facility: HOSPITAL | Age: 56
Discharge: HOME | End: 2021-06-16
Attending: NURSE PRACTITIONER
Payer: COMMERCIAL

## 2021-06-16 ENCOUNTER — OFFICE VISIT (OUTPATIENT)
Dept: INTERNAL MEDICINE | Facility: CLINIC | Age: 56
End: 2021-06-16
Payer: COMMERCIAL

## 2021-06-16 VITALS
DIASTOLIC BLOOD PRESSURE: 72 MMHG | OXYGEN SATURATION: 97 % | SYSTOLIC BLOOD PRESSURE: 118 MMHG | RESPIRATION RATE: 16 BRPM | HEIGHT: 62 IN | BODY MASS INDEX: 31 KG/M2 | WEIGHT: 168.44 LBS | HEART RATE: 78 BPM

## 2021-06-16 DIAGNOSIS — R10.9 ABDOMINAL PAIN, UNSPECIFIED ABDOMINAL LOCATION: ICD-10-CM

## 2021-06-16 DIAGNOSIS — R50.9 FEVER, UNSPECIFIED FEVER CAUSE: ICD-10-CM

## 2021-06-16 DIAGNOSIS — R10.9 ABDOMINAL PAIN, UNSPECIFIED ABDOMINAL LOCATION: Primary | ICD-10-CM

## 2021-06-16 PROCEDURE — 3008F BODY MASS INDEX DOCD: CPT | Mod: CPTII,S$GLB,, | Performed by: NURSE PRACTITIONER

## 2021-06-16 PROCEDURE — 99214 PR OFFICE/OUTPT VISIT, EST, LEVL IV, 30-39 MIN: ICD-10-PCS | Mod: S$GLB,,, | Performed by: NURSE PRACTITIONER

## 2021-06-16 PROCEDURE — 74177 CT ABD & PELVIS W/CONTRAST: CPT | Mod: 26,,, | Performed by: RADIOLOGY

## 2021-06-16 PROCEDURE — 25500020 PHARM REV CODE 255: Performed by: NURSE PRACTITIONER

## 2021-06-16 PROCEDURE — 99999 PR PBB SHADOW E&M-EST. PATIENT-LVL IV: CPT | Mod: PBBFAC,,, | Performed by: NURSE PRACTITIONER

## 2021-06-16 PROCEDURE — 99999 PR PBB SHADOW E&M-EST. PATIENT-LVL IV: ICD-10-PCS | Mod: PBBFAC,,, | Performed by: NURSE PRACTITIONER

## 2021-06-16 PROCEDURE — 99214 OFFICE O/P EST MOD 30 MIN: CPT | Mod: S$GLB,,, | Performed by: NURSE PRACTITIONER

## 2021-06-16 PROCEDURE — 3008F PR BODY MASS INDEX (BMI) DOCUMENTED: ICD-10-PCS | Mod: CPTII,S$GLB,, | Performed by: NURSE PRACTITIONER

## 2021-06-16 PROCEDURE — 1125F PR PAIN SEVERITY QUANTIFIED, PAIN PRESENT: ICD-10-PCS | Mod: S$GLB,,, | Performed by: NURSE PRACTITIONER

## 2021-06-16 PROCEDURE — 1125F AMNT PAIN NOTED PAIN PRSNT: CPT | Mod: S$GLB,,, | Performed by: NURSE PRACTITIONER

## 2021-06-16 PROCEDURE — 74177 CT ABD & PELVIS W/CONTRAST: CPT | Mod: TC

## 2021-06-16 PROCEDURE — 74177 CT ABDOMEN PELVIS WITH CONTRAST: ICD-10-PCS | Mod: 26,,, | Performed by: RADIOLOGY

## 2021-06-16 RX ADMIN — IOHEXOL 75 ML: 350 INJECTION, SOLUTION INTRAVENOUS at 04:06

## 2021-06-16 RX ADMIN — IOHEXOL 30 ML: 350 INJECTION, SOLUTION INTRAVENOUS at 04:06

## 2021-06-17 ENCOUNTER — TELEPHONE (OUTPATIENT)
Dept: OBSTETRICS AND GYNECOLOGY | Facility: CLINIC | Age: 56
End: 2021-06-17

## 2021-06-17 DIAGNOSIS — N85.8 UTERINE MASS: Primary | ICD-10-CM

## 2021-06-22 ENCOUNTER — APPOINTMENT (OUTPATIENT)
Dept: RADIOLOGY | Facility: CLINIC | Age: 56
End: 2021-06-22
Attending: OBSTETRICS & GYNECOLOGY
Payer: COMMERCIAL

## 2021-06-22 ENCOUNTER — OFFICE VISIT (OUTPATIENT)
Dept: OBSTETRICS AND GYNECOLOGY | Facility: CLINIC | Age: 56
End: 2021-06-22
Payer: COMMERCIAL

## 2021-06-22 VITALS
HEART RATE: 70 BPM | BODY MASS INDEX: 30.36 KG/M2 | WEIGHT: 165 LBS | RESPIRATION RATE: 18 BRPM | HEIGHT: 62 IN | SYSTOLIC BLOOD PRESSURE: 98 MMHG | DIASTOLIC BLOOD PRESSURE: 72 MMHG

## 2021-06-22 DIAGNOSIS — N85.8 UTERINE MASS: ICD-10-CM

## 2021-06-22 DIAGNOSIS — D25.9 UTERINE LEIOMYOMA, UNSPECIFIED LOCATION: Primary | ICD-10-CM

## 2021-06-22 PROCEDURE — 76830 US PELVIS COMP WITH TRANSVAG NON-OB (XPD): ICD-10-PCS | Mod: 26,,, | Performed by: RADIOLOGY

## 2021-06-22 PROCEDURE — 99999 PR PBB SHADOW E&M-EST. PATIENT-LVL III: CPT | Mod: PBBFAC,,, | Performed by: OBSTETRICS & GYNECOLOGY

## 2021-06-22 PROCEDURE — 3008F PR BODY MASS INDEX (BMI) DOCUMENTED: ICD-10-PCS | Mod: CPTII,S$GLB,, | Performed by: OBSTETRICS & GYNECOLOGY

## 2021-06-22 PROCEDURE — 99213 PR OFFICE/OUTPT VISIT, EST, LEVL III, 20-29 MIN: ICD-10-PCS | Mod: S$GLB,,, | Performed by: OBSTETRICS & GYNECOLOGY

## 2021-06-22 PROCEDURE — 76856 US PELVIS COMP WITH TRANSVAG NON-OB (XPD): ICD-10-PCS | Mod: 26,,, | Performed by: RADIOLOGY

## 2021-06-22 PROCEDURE — 99999 PR PBB SHADOW E&M-EST. PATIENT-LVL III: ICD-10-PCS | Mod: PBBFAC,,, | Performed by: OBSTETRICS & GYNECOLOGY

## 2021-06-22 PROCEDURE — 3008F BODY MASS INDEX DOCD: CPT | Mod: CPTII,S$GLB,, | Performed by: OBSTETRICS & GYNECOLOGY

## 2021-06-22 PROCEDURE — 76830 TRANSVAGINAL US NON-OB: CPT | Mod: 26,,, | Performed by: RADIOLOGY

## 2021-06-22 PROCEDURE — 99213 OFFICE O/P EST LOW 20 MIN: CPT | Mod: S$GLB,,, | Performed by: OBSTETRICS & GYNECOLOGY

## 2021-06-22 PROCEDURE — 76856 US EXAM PELVIC COMPLETE: CPT | Mod: TC

## 2021-06-22 PROCEDURE — 1126F PR PAIN SEVERITY QUANTIFIED, NO PAIN PRESENT: ICD-10-PCS | Mod: S$GLB,,, | Performed by: OBSTETRICS & GYNECOLOGY

## 2021-06-22 PROCEDURE — 1126F AMNT PAIN NOTED NONE PRSNT: CPT | Mod: S$GLB,,, | Performed by: OBSTETRICS & GYNECOLOGY

## 2021-06-22 PROCEDURE — 76856 US EXAM PELVIC COMPLETE: CPT | Mod: 26,,, | Performed by: RADIOLOGY

## 2021-08-03 ENCOUNTER — IMMUNIZATION (OUTPATIENT)
Dept: FAMILY MEDICINE | Facility: CLINIC | Age: 56
End: 2021-08-03
Payer: COMMERCIAL

## 2021-08-03 DIAGNOSIS — Z23 NEED FOR VACCINATION: Primary | ICD-10-CM

## 2021-08-03 PROCEDURE — 0001A COVID-19, MRNA, LNP-S, PF, 30 MCG/0.3 ML DOSE VACCINE: ICD-10-PCS | Mod: CV19,,, | Performed by: FAMILY MEDICINE

## 2021-08-03 PROCEDURE — 91300 COVID-19, MRNA, LNP-S, PF, 30 MCG/0.3 ML DOSE VACCINE: CPT | Mod: ,,, | Performed by: FAMILY MEDICINE

## 2021-08-03 PROCEDURE — 91300 COVID-19, MRNA, LNP-S, PF, 30 MCG/0.3 ML DOSE VACCINE: ICD-10-PCS | Mod: ,,, | Performed by: FAMILY MEDICINE

## 2021-08-03 PROCEDURE — 0001A COVID-19, MRNA, LNP-S, PF, 30 MCG/0.3 ML DOSE VACCINE: CPT | Mod: CV19,,, | Performed by: FAMILY MEDICINE

## 2021-08-19 ENCOUNTER — TELEPHONE (OUTPATIENT)
Dept: INTERNAL MEDICINE | Facility: CLINIC | Age: 56
End: 2021-08-19

## 2021-08-19 ENCOUNTER — PATIENT MESSAGE (OUTPATIENT)
Dept: INTERNAL MEDICINE | Facility: CLINIC | Age: 56
End: 2021-08-19

## 2021-08-19 DIAGNOSIS — U07.1 COVID-19: Primary | ICD-10-CM

## 2021-08-21 ENCOUNTER — INFUSION (OUTPATIENT)
Dept: INFECTIOUS DISEASES | Facility: HOSPITAL | Age: 56
End: 2021-08-21
Attending: INTERNAL MEDICINE
Payer: COMMERCIAL

## 2021-08-21 VITALS
HEART RATE: 63 BPM | TEMPERATURE: 98 F | SYSTOLIC BLOOD PRESSURE: 97 MMHG | DIASTOLIC BLOOD PRESSURE: 65 MMHG | RESPIRATION RATE: 20 BRPM | OXYGEN SATURATION: 96 %

## 2021-08-21 DIAGNOSIS — U07.1 COVID-19: Primary | ICD-10-CM

## 2021-08-21 PROCEDURE — 63600175 PHARM REV CODE 636 W HCPCS: Performed by: INTERNAL MEDICINE

## 2021-08-21 PROCEDURE — 25000003 PHARM REV CODE 250: Performed by: INTERNAL MEDICINE

## 2021-08-21 PROCEDURE — M0243 CASIRIVI AND IMDEVI INFUSION: HCPCS | Performed by: INTERNAL MEDICINE

## 2021-08-21 RX ORDER — ONDANSETRON 4 MG/1
4 TABLET, ORALLY DISINTEGRATING ORAL ONCE AS NEEDED
Status: DISCONTINUED | OUTPATIENT
Start: 2021-08-21 | End: 2022-09-12

## 2021-08-21 RX ORDER — ALBUTEROL SULFATE 90 UG/1
2 AEROSOL, METERED RESPIRATORY (INHALATION)
Status: DISCONTINUED | OUTPATIENT
Start: 2021-08-21 | End: 2022-09-12

## 2021-08-21 RX ORDER — DIPHENHYDRAMINE HYDROCHLORIDE 50 MG/ML
25 INJECTION INTRAMUSCULAR; INTRAVENOUS ONCE AS NEEDED
Status: DISCONTINUED | OUTPATIENT
Start: 2021-08-21 | End: 2022-09-12

## 2021-08-21 RX ORDER — EPINEPHRINE 0.3 MG/.3ML
0.3 INJECTION SUBCUTANEOUS
Status: DISCONTINUED | OUTPATIENT
Start: 2021-08-21 | End: 2022-09-12

## 2021-08-21 RX ORDER — ACETAMINOPHEN 325 MG/1
650 TABLET ORAL ONCE AS NEEDED
Status: DISCONTINUED | OUTPATIENT
Start: 2021-08-21 | End: 2022-09-12

## 2021-08-21 RX ORDER — SODIUM CHLORIDE 0.9 % (FLUSH) 0.9 %
10 SYRINGE (ML) INJECTION
Status: DISCONTINUED | OUTPATIENT
Start: 2021-08-21 | End: 2022-09-12

## 2021-08-21 RX ADMIN — CASIRIVIMAB AND IMDEVIMAB 600 MG: 600; 600 INJECTION, SOLUTION, CONCENTRATE INTRAVENOUS at 08:08

## 2021-09-20 DIAGNOSIS — G62.0 NEUROPATHY DUE TO CHEMOTHERAPEUTIC DRUG: ICD-10-CM

## 2021-09-20 DIAGNOSIS — T45.1X5A NEUROPATHY DUE TO CHEMOTHERAPEUTIC DRUG: ICD-10-CM

## 2021-09-20 DIAGNOSIS — K21.9 ACID REFLUX: ICD-10-CM

## 2021-09-20 RX ORDER — PREGABALIN 150 MG/1
150 CAPSULE ORAL 2 TIMES DAILY
Qty: 60 CAPSULE | Refills: 3 | Status: SHIPPED | OUTPATIENT
Start: 2021-09-20 | End: 2021-10-23 | Stop reason: SDUPTHER

## 2021-09-21 RX ORDER — OMEPRAZOLE 40 MG/1
40 CAPSULE, DELAYED RELEASE ORAL DAILY
Qty: 60 CAPSULE | Refills: 11 | Status: SHIPPED | OUTPATIENT
Start: 2021-09-21 | End: 2022-11-12 | Stop reason: SDUPTHER

## 2021-09-22 ENCOUNTER — TELEPHONE (OUTPATIENT)
Dept: HEMATOLOGY/ONCOLOGY | Facility: CLINIC | Age: 56
End: 2021-09-22

## 2021-09-23 ENCOUNTER — PATIENT MESSAGE (OUTPATIENT)
Dept: SURGERY | Facility: CLINIC | Age: 56
End: 2021-09-23

## 2021-09-23 DIAGNOSIS — Z85.9 HISTORY OF MALIGNANT NEOPLASM: Primary | ICD-10-CM

## 2021-09-27 ENCOUNTER — TELEPHONE (OUTPATIENT)
Dept: ENDOSCOPY | Facility: HOSPITAL | Age: 56
End: 2021-09-27

## 2021-09-27 DIAGNOSIS — Z01.818 PRE-OP TESTING: ICD-10-CM

## 2021-09-27 DIAGNOSIS — Z12.11 SPECIAL SCREENING FOR MALIGNANT NEOPLASMS, COLON: Primary | ICD-10-CM

## 2021-09-27 RX ORDER — SODIUM, POTASSIUM,MAG SULFATES 17.5-3.13G
1 SOLUTION, RECONSTITUTED, ORAL ORAL DAILY
Qty: 354 ML | Refills: 0 | Status: SHIPPED | OUTPATIENT
Start: 2021-09-27 | End: 2021-10-20

## 2021-10-04 ENCOUNTER — LAB VISIT (OUTPATIENT)
Dept: FAMILY MEDICINE | Facility: CLINIC | Age: 56
End: 2021-10-04
Payer: COMMERCIAL

## 2021-10-04 DIAGNOSIS — Z01.818 PRE-OP TESTING: ICD-10-CM

## 2021-10-04 PROCEDURE — U0003 INFECTIOUS AGENT DETECTION BY NUCLEIC ACID (DNA OR RNA); SEVERE ACUTE RESPIRATORY SYNDROME CORONAVIRUS 2 (SARS-COV-2) (CORONAVIRUS DISEASE [COVID-19]), AMPLIFIED PROBE TECHNIQUE, MAKING USE OF HIGH THROUGHPUT TECHNOLOGIES AS DESCRIBED BY CMS-2020-01-R: HCPCS | Performed by: CLINICAL NURSE SPECIALIST

## 2021-10-04 PROCEDURE — U0005 INFEC AGEN DETEC AMPLI PROBE: HCPCS | Performed by: CLINICAL NURSE SPECIALIST

## 2021-10-05 ENCOUNTER — TELEPHONE (OUTPATIENT)
Dept: WOUND CARE | Facility: CLINIC | Age: 56
End: 2021-10-05

## 2021-10-05 LAB
SARS-COV-2 RNA RESP QL NAA+PROBE: DETECTED
SARS-COV-2- CYCLE NUMBER: 34

## 2021-10-06 ENCOUNTER — PATIENT MESSAGE (OUTPATIENT)
Dept: SURGERY | Facility: CLINIC | Age: 56
End: 2021-10-06

## 2021-10-06 ENCOUNTER — PATIENT OUTREACH (OUTPATIENT)
Dept: INFECTIOUS DISEASES | Facility: HOSPITAL | Age: 56
End: 2021-10-06

## 2021-10-06 ENCOUNTER — PATIENT MESSAGE (OUTPATIENT)
Dept: INFECTIOUS DISEASES | Facility: HOSPITAL | Age: 56
End: 2021-10-06

## 2021-10-22 ENCOUNTER — PATIENT MESSAGE (OUTPATIENT)
Dept: HEMATOLOGY/ONCOLOGY | Facility: CLINIC | Age: 56
End: 2021-10-22
Payer: COMMERCIAL

## 2021-10-23 ENCOUNTER — OFFICE VISIT (OUTPATIENT)
Dept: URGENT CARE | Facility: CLINIC | Age: 56
End: 2021-10-23
Payer: COMMERCIAL

## 2021-10-23 VITALS
OXYGEN SATURATION: 96 % | BODY MASS INDEX: 30.67 KG/M2 | HEART RATE: 71 BPM | WEIGHT: 165 LBS | DIASTOLIC BLOOD PRESSURE: 76 MMHG | SYSTOLIC BLOOD PRESSURE: 119 MMHG | TEMPERATURE: 99 F

## 2021-10-23 DIAGNOSIS — Z76.0 ENCOUNTER FOR MEDICATION REFILL: ICD-10-CM

## 2021-10-23 DIAGNOSIS — T45.1X5A NEUROPATHY DUE TO CHEMOTHERAPEUTIC DRUG: Primary | ICD-10-CM

## 2021-10-23 DIAGNOSIS — G62.0 NEUROPATHY DUE TO CHEMOTHERAPEUTIC DRUG: Primary | ICD-10-CM

## 2021-10-23 PROCEDURE — 99213 OFFICE O/P EST LOW 20 MIN: CPT | Mod: S$GLB,,, | Performed by: PHYSICIAN ASSISTANT

## 2021-10-23 PROCEDURE — 1159F PR MEDICATION LIST DOCUMENTED IN MEDICAL RECORD: ICD-10-PCS | Mod: CPTII,S$GLB,, | Performed by: PHYSICIAN ASSISTANT

## 2021-10-23 PROCEDURE — 1159F MED LIST DOCD IN RCRD: CPT | Mod: CPTII,S$GLB,, | Performed by: PHYSICIAN ASSISTANT

## 2021-10-23 PROCEDURE — 1160F RVW MEDS BY RX/DR IN RCRD: CPT | Mod: CPTII,S$GLB,, | Performed by: PHYSICIAN ASSISTANT

## 2021-10-23 PROCEDURE — 3078F PR MOST RECENT DIASTOLIC BLOOD PRESSURE < 80 MM HG: ICD-10-PCS | Mod: CPTII,S$GLB,, | Performed by: PHYSICIAN ASSISTANT

## 2021-10-23 PROCEDURE — 3074F PR MOST RECENT SYSTOLIC BLOOD PRESSURE < 130 MM HG: ICD-10-PCS | Mod: CPTII,S$GLB,, | Performed by: PHYSICIAN ASSISTANT

## 2021-10-23 PROCEDURE — 3078F DIAST BP <80 MM HG: CPT | Mod: CPTII,S$GLB,, | Performed by: PHYSICIAN ASSISTANT

## 2021-10-23 PROCEDURE — 3074F SYST BP LT 130 MM HG: CPT | Mod: CPTII,S$GLB,, | Performed by: PHYSICIAN ASSISTANT

## 2021-10-23 PROCEDURE — 1160F PR REVIEW ALL MEDS BY PRESCRIBER/CLIN PHARMACIST DOCUMENTED: ICD-10-PCS | Mod: CPTII,S$GLB,, | Performed by: PHYSICIAN ASSISTANT

## 2021-10-23 PROCEDURE — 3008F BODY MASS INDEX DOCD: CPT | Mod: CPTII,S$GLB,, | Performed by: PHYSICIAN ASSISTANT

## 2021-10-23 PROCEDURE — 3008F PR BODY MASS INDEX (BMI) DOCUMENTED: ICD-10-PCS | Mod: CPTII,S$GLB,, | Performed by: PHYSICIAN ASSISTANT

## 2021-10-23 PROCEDURE — 99213 PR OFFICE/OUTPT VISIT, EST, LEVL III, 20-29 MIN: ICD-10-PCS | Mod: S$GLB,,, | Performed by: PHYSICIAN ASSISTANT

## 2021-10-23 RX ORDER — PREGABALIN 150 MG/1
150 CAPSULE ORAL 2 TIMES DAILY
Qty: 60 CAPSULE | Refills: 0 | Status: SHIPPED | OUTPATIENT
Start: 2021-10-23 | End: 2021-10-25 | Stop reason: SDUPTHER

## 2021-10-25 DIAGNOSIS — G62.0 NEUROPATHY DUE TO CHEMOTHERAPEUTIC DRUG: ICD-10-CM

## 2021-10-25 DIAGNOSIS — T45.1X5A NEUROPATHY DUE TO CHEMOTHERAPEUTIC DRUG: ICD-10-CM

## 2021-10-25 RX ORDER — PREGABALIN 150 MG/1
150 CAPSULE ORAL 2 TIMES DAILY
Qty: 60 CAPSULE | Refills: 0 | Status: SHIPPED | OUTPATIENT
Start: 2021-10-25 | End: 2021-11-22 | Stop reason: SDUPTHER

## 2021-10-26 ENCOUNTER — ANESTHESIA EVENT (OUTPATIENT)
Dept: ENDOSCOPY | Facility: HOSPITAL | Age: 56
End: 2021-10-26
Payer: COMMERCIAL

## 2021-10-26 ENCOUNTER — PATIENT MESSAGE (OUTPATIENT)
Dept: HEMATOLOGY/ONCOLOGY | Facility: CLINIC | Age: 56
End: 2021-10-26
Payer: COMMERCIAL

## 2021-10-26 ENCOUNTER — ANESTHESIA (OUTPATIENT)
Dept: ENDOSCOPY | Facility: HOSPITAL | Age: 56
End: 2021-10-26
Payer: COMMERCIAL

## 2021-10-26 ENCOUNTER — HOSPITAL ENCOUNTER (OUTPATIENT)
Facility: HOSPITAL | Age: 56
Discharge: HOME | End: 2021-10-26
Attending: COLON & RECTAL SURGERY | Admitting: COLON & RECTAL SURGERY
Payer: COMMERCIAL

## 2021-10-26 VITALS
RESPIRATION RATE: 13 BRPM | SYSTOLIC BLOOD PRESSURE: 108 MMHG | BODY MASS INDEX: 30.21 KG/M2 | DIASTOLIC BLOOD PRESSURE: 62 MMHG | TEMPERATURE: 98 F | HEIGHT: 61 IN | HEART RATE: 57 BPM | OXYGEN SATURATION: 98 % | WEIGHT: 160 LBS

## 2021-10-26 DIAGNOSIS — Z85.038 HISTORY OF COLON CANCER: ICD-10-CM

## 2021-10-26 PROCEDURE — 63600175 PHARM REV CODE 636 W HCPCS: Performed by: NURSE ANESTHETIST, CERTIFIED REGISTERED

## 2021-10-26 PROCEDURE — 37000009 HC ANESTHESIA EA ADD 15 MINS: Performed by: COLON & RECTAL SURGERY

## 2021-10-26 PROCEDURE — G0105 COLORECTAL SCRN; HI RISK IND: HCPCS | Performed by: COLON & RECTAL SURGERY

## 2021-10-26 PROCEDURE — E9220 PRA ENDO ANESTHESIA: HCPCS | Mod: ,,, | Performed by: NURSE ANESTHETIST, CERTIFIED REGISTERED

## 2021-10-26 PROCEDURE — 25000003 PHARM REV CODE 250: Performed by: NURSE ANESTHETIST, CERTIFIED REGISTERED

## 2021-10-26 PROCEDURE — G0105 COLORECTAL SCRN; HI RISK IND: HCPCS | Mod: ,,, | Performed by: COLON & RECTAL SURGERY

## 2021-10-26 PROCEDURE — 25000003 PHARM REV CODE 250: Performed by: COLON & RECTAL SURGERY

## 2021-10-26 PROCEDURE — G0105 COLORECTAL SCRN; HI RISK IND: ICD-10-PCS | Mod: ,,, | Performed by: COLON & RECTAL SURGERY

## 2021-10-26 PROCEDURE — E9220 PRA ENDO ANESTHESIA: ICD-10-PCS | Mod: ,,, | Performed by: NURSE ANESTHETIST, CERTIFIED REGISTERED

## 2021-10-26 PROCEDURE — 37000008 HC ANESTHESIA 1ST 15 MINUTES: Performed by: COLON & RECTAL SURGERY

## 2021-10-26 RX ORDER — SODIUM CHLORIDE 9 MG/ML
INJECTION, SOLUTION INTRAVENOUS CONTINUOUS
Status: DISCONTINUED | OUTPATIENT
Start: 2021-10-26 | End: 2021-10-26 | Stop reason: HOSPADM

## 2021-10-26 RX ORDER — LIDOCAINE HYDROCHLORIDE 20 MG/ML
INJECTION INTRAVENOUS
Status: DISCONTINUED | OUTPATIENT
Start: 2021-10-26 | End: 2021-10-26

## 2021-10-26 RX ORDER — PROPOFOL 10 MG/ML
VIAL (ML) INTRAVENOUS
Status: DISCONTINUED | OUTPATIENT
Start: 2021-10-26 | End: 2021-10-26

## 2021-10-26 RX ORDER — PROPOFOL 10 MG/ML
VIAL (ML) INTRAVENOUS CONTINUOUS PRN
Status: DISCONTINUED | OUTPATIENT
Start: 2021-10-26 | End: 2021-10-26

## 2021-10-26 RX ADMIN — SODIUM CHLORIDE: 0.9 INJECTION, SOLUTION INTRAVENOUS at 06:10

## 2021-10-26 RX ADMIN — Medication 150 MCG/KG/MIN: at 06:10

## 2021-10-26 RX ADMIN — LIDOCAINE HYDROCHLORIDE 40 MG: 20 INJECTION, SOLUTION INTRAVENOUS at 06:10

## 2021-10-26 RX ADMIN — PROPOFOL 50 MG: 10 INJECTION, EMULSION INTRAVENOUS at 06:10

## 2021-11-22 DIAGNOSIS — T45.1X5A NEUROPATHY DUE TO CHEMOTHERAPEUTIC DRUG: ICD-10-CM

## 2021-11-22 DIAGNOSIS — G62.0 NEUROPATHY DUE TO CHEMOTHERAPEUTIC DRUG: ICD-10-CM

## 2021-11-22 RX ORDER — PREGABALIN 150 MG/1
150 CAPSULE ORAL 2 TIMES DAILY
Qty: 60 CAPSULE | Refills: 0 | Status: SHIPPED | OUTPATIENT
Start: 2021-11-22 | End: 2021-12-20 | Stop reason: SDUPTHER

## 2021-11-26 ENCOUNTER — HOSPITAL ENCOUNTER (OUTPATIENT)
Dept: RADIOLOGY | Facility: HOSPITAL | Age: 56
Discharge: HOME | End: 2021-11-26
Attending: INTERNAL MEDICINE
Payer: COMMERCIAL

## 2021-11-26 DIAGNOSIS — Z85.038 HISTORY OF COLON CANCER: ICD-10-CM

## 2021-11-26 PROCEDURE — 25500020 PHARM REV CODE 255: Performed by: INTERNAL MEDICINE

## 2021-11-26 PROCEDURE — 71260 CT THORAX DX C+: CPT | Mod: 26,,, | Performed by: RADIOLOGY

## 2021-11-26 PROCEDURE — 71260 CT THORAX DX C+: CPT | Mod: TC

## 2021-11-26 PROCEDURE — 74177 CT ABD & PELVIS W/CONTRAST: CPT | Mod: TC

## 2021-11-26 PROCEDURE — 74177 CT CHEST ABDOMEN PELVIS WITH CONTRAST (XPD): ICD-10-PCS | Mod: 26,,, | Performed by: RADIOLOGY

## 2021-11-26 PROCEDURE — 74177 CT ABD & PELVIS W/CONTRAST: CPT | Mod: 26,,, | Performed by: RADIOLOGY

## 2021-11-26 PROCEDURE — 71260 CT CHEST ABDOMEN PELVIS WITH CONTRAST (XPD): ICD-10-PCS | Mod: 26,,, | Performed by: RADIOLOGY

## 2021-11-26 RX ADMIN — IOHEXOL 75 ML: 350 INJECTION, SOLUTION INTRAVENOUS at 09:11

## 2021-11-26 RX ADMIN — IOHEXOL 30 ML: 350 INJECTION, SOLUTION INTRAVENOUS at 09:11

## 2021-11-29 ENCOUNTER — OFFICE VISIT (OUTPATIENT)
Dept: HEMATOLOGY/ONCOLOGY | Facility: CLINIC | Age: 56
End: 2021-11-29
Payer: COMMERCIAL

## 2021-11-29 VITALS
SYSTOLIC BLOOD PRESSURE: 121 MMHG | WEIGHT: 160.5 LBS | HEIGHT: 62 IN | HEART RATE: 54 BPM | TEMPERATURE: 98 F | DIASTOLIC BLOOD PRESSURE: 74 MMHG | OXYGEN SATURATION: 97 % | BODY MASS INDEX: 29.53 KG/M2 | RESPIRATION RATE: 16 BRPM

## 2021-11-29 DIAGNOSIS — Z85.038 HISTORY OF COLON CANCER: Primary | ICD-10-CM

## 2021-11-29 DIAGNOSIS — C77.9 SECONDARY ADENOCARCINOMA OF LYMPH NODE: ICD-10-CM

## 2021-11-29 PROCEDURE — 99999 PR PBB SHADOW E&M-EST. PATIENT-LVL IV: CPT | Mod: PBBFAC,,, | Performed by: INTERNAL MEDICINE

## 2021-11-29 PROCEDURE — 99214 PR OFFICE/OUTPT VISIT, EST, LEVL IV, 30-39 MIN: ICD-10-PCS | Mod: S$GLB,,, | Performed by: INTERNAL MEDICINE

## 2021-11-29 PROCEDURE — 99214 OFFICE O/P EST MOD 30 MIN: CPT | Mod: S$GLB,,, | Performed by: INTERNAL MEDICINE

## 2021-11-29 PROCEDURE — 99999 PR PBB SHADOW E&M-EST. PATIENT-LVL IV: ICD-10-PCS | Mod: PBBFAC,,, | Performed by: INTERNAL MEDICINE

## 2021-11-30 ENCOUNTER — PATIENT OUTREACH (OUTPATIENT)
Dept: ADMINISTRATIVE | Facility: HOSPITAL | Age: 56
End: 2021-11-30
Payer: COMMERCIAL

## 2021-12-20 DIAGNOSIS — G62.0 NEUROPATHY DUE TO CHEMOTHERAPEUTIC DRUG: ICD-10-CM

## 2021-12-20 DIAGNOSIS — T45.1X5A NEUROPATHY DUE TO CHEMOTHERAPEUTIC DRUG: ICD-10-CM

## 2021-12-21 RX ORDER — PREGABALIN 150 MG/1
150 CAPSULE ORAL 2 TIMES DAILY
Qty: 60 CAPSULE | Refills: 0 | Status: SHIPPED | OUTPATIENT
Start: 2021-12-21 | End: 2022-01-20 | Stop reason: SDUPTHER

## 2022-01-20 ENCOUNTER — PATIENT MESSAGE (OUTPATIENT)
Dept: HEMATOLOGY/ONCOLOGY | Facility: CLINIC | Age: 57
End: 2022-01-20
Payer: COMMERCIAL

## 2022-01-20 DIAGNOSIS — G62.0 NEUROPATHY DUE TO CHEMOTHERAPEUTIC DRUG: ICD-10-CM

## 2022-01-20 DIAGNOSIS — T45.1X5A NEUROPATHY DUE TO CHEMOTHERAPEUTIC DRUG: ICD-10-CM

## 2022-01-20 RX ORDER — PREGABALIN 150 MG/1
150 CAPSULE ORAL 2 TIMES DAILY
Qty: 60 CAPSULE | Refills: 0 | OUTPATIENT
Start: 2022-01-20 | End: 2022-07-21

## 2022-01-21 RX ORDER — PREGABALIN 150 MG/1
150 CAPSULE ORAL 2 TIMES DAILY
Qty: 60 CAPSULE | Refills: 11 | Status: SHIPPED | OUTPATIENT
Start: 2022-01-21 | End: 2022-08-16 | Stop reason: SDUPTHER

## 2022-02-03 ENCOUNTER — IMMUNIZATION (OUTPATIENT)
Dept: FAMILY MEDICINE | Facility: CLINIC | Age: 57
End: 2022-02-03
Payer: COMMERCIAL

## 2022-02-03 DIAGNOSIS — Z23 NEED FOR VACCINATION: Primary | ICD-10-CM

## 2022-02-03 PROCEDURE — 0002A COVID-19, MRNA, LNP-S, PF, 30 MCG/0.3 ML DOSE VACCINE: CPT | Mod: PBBFAC | Performed by: INTERNAL MEDICINE

## 2022-02-22 DIAGNOSIS — D84.9 IMMUNOSUPPRESSED STATUS: ICD-10-CM

## 2022-03-20 ENCOUNTER — PATIENT OUTREACH (OUTPATIENT)
Dept: ADMINISTRATIVE | Facility: OTHER | Age: 57
End: 2022-03-20
Payer: COMMERCIAL

## 2022-03-20 NOTE — PROGRESS NOTES
Health Maintenance Due   Topic Date Due    Hepatitis C Screening  Never done    Pneumococcal Vaccines (Age 0-64) (1 of 4 - PCV13) Never done    HIV Screening  Never done    Lipid Panel  10/15/2015    Shingles Vaccine (1 of 2) Never done    Influenza Vaccine (1) Never done    Cervical Cancer Screening  03/19/2022    Mammogram  05/07/2022     Updates were requested from care everywhere.  Chart was reviewed for overdue Proactive [facility] Encounters (DANNY) topics (CRS, Breast Cancer Screening, Eye exam)  Health Maintenance has been updated.  LINKS immunization registry triggered.  Immunizations were reconciled.

## 2022-03-24 ENCOUNTER — OFFICE VISIT (OUTPATIENT)
Dept: OBSTETRICS AND GYNECOLOGY | Facility: CLINIC | Age: 57
End: 2022-03-24
Payer: COMMERCIAL

## 2022-03-24 VITALS
SYSTOLIC BLOOD PRESSURE: 106 MMHG | DIASTOLIC BLOOD PRESSURE: 70 MMHG | RESPIRATION RATE: 13 BRPM | WEIGHT: 162 LBS | HEART RATE: 84 BPM | BODY MASS INDEX: 29.81 KG/M2 | HEIGHT: 62 IN

## 2022-03-24 DIAGNOSIS — Z12.31 ENCOUNTER FOR SCREENING MAMMOGRAM FOR MALIGNANT NEOPLASM OF BREAST: ICD-10-CM

## 2022-03-24 DIAGNOSIS — Z01.419 WELL WOMAN EXAM WITH ROUTINE GYNECOLOGICAL EXAM: Primary | ICD-10-CM

## 2022-03-24 PROCEDURE — 1159F PR MEDICATION LIST DOCUMENTED IN MEDICAL RECORD: ICD-10-PCS | Mod: CPTII,S$GLB,, | Performed by: OBSTETRICS & GYNECOLOGY

## 2022-03-24 PROCEDURE — 99999 PR PBB SHADOW E&M-EST. PATIENT-LVL IV: ICD-10-PCS | Mod: PBBFAC,,, | Performed by: OBSTETRICS & GYNECOLOGY

## 2022-03-24 PROCEDURE — 99396 PR PREVENTIVE VISIT,EST,40-64: ICD-10-PCS | Mod: S$GLB,,, | Performed by: OBSTETRICS & GYNECOLOGY

## 2022-03-24 PROCEDURE — 3074F SYST BP LT 130 MM HG: CPT | Mod: CPTII,S$GLB,, | Performed by: OBSTETRICS & GYNECOLOGY

## 2022-03-24 PROCEDURE — 3008F PR BODY MASS INDEX (BMI) DOCUMENTED: ICD-10-PCS | Mod: CPTII,S$GLB,, | Performed by: OBSTETRICS & GYNECOLOGY

## 2022-03-24 PROCEDURE — 99396 PREV VISIT EST AGE 40-64: CPT | Mod: S$GLB,,, | Performed by: OBSTETRICS & GYNECOLOGY

## 2022-03-24 PROCEDURE — 3074F PR MOST RECENT SYSTOLIC BLOOD PRESSURE < 130 MM HG: ICD-10-PCS | Mod: CPTII,S$GLB,, | Performed by: OBSTETRICS & GYNECOLOGY

## 2022-03-24 PROCEDURE — 99999 PR PBB SHADOW E&M-EST. PATIENT-LVL IV: CPT | Mod: PBBFAC,,, | Performed by: OBSTETRICS & GYNECOLOGY

## 2022-03-24 PROCEDURE — 3008F BODY MASS INDEX DOCD: CPT | Mod: CPTII,S$GLB,, | Performed by: OBSTETRICS & GYNECOLOGY

## 2022-03-24 PROCEDURE — 1159F MED LIST DOCD IN RCRD: CPT | Mod: CPTII,S$GLB,, | Performed by: OBSTETRICS & GYNECOLOGY

## 2022-03-24 PROCEDURE — 3078F DIAST BP <80 MM HG: CPT | Mod: CPTII,S$GLB,, | Performed by: OBSTETRICS & GYNECOLOGY

## 2022-03-24 PROCEDURE — 3078F PR MOST RECENT DIASTOLIC BLOOD PRESSURE < 80 MM HG: ICD-10-PCS | Mod: CPTII,S$GLB,, | Performed by: OBSTETRICS & GYNECOLOGY

## 2022-03-24 NOTE — PROGRESS NOTES
Subjective:    Patient ID: Irene Galo is a 56 y.o. y.o. female.     Chief Complaint: Annual Well Woman Exam     History of Present Illness:  Irene presents today for Annual Well Woman exam. She describes her menses as absent s/p chemo.She denies pelvic pain.  She denies breast tenderness, masses, nipple discharge. She denies difficulty with urination or bowel movements. She denies menopausal symptoms such as hotflashes, vaginal dryness, and night sweats. She denies bloating, early satiety, or weight changes. She is sexually active. Contraception is by  vasectomy.    Patient had colon resection with chemotherapy preformed in 2019. She reports since chemo she has had no cycles. No vasomotor symptoms.     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     The following portions of the patient's history were reviewed and updated as appropriate: allergies, current medications, past family history, past medical history, past social history, past surgical history and problem list.      Menstrual History:   Patient's last menstrual period was 2019..     OB History        4    Para   4    Term   4            AB        Living   5       SAB        IAB        Ectopic        Multiple   1    Live Births                     The following portions of the patient's history were reviewed and updated as appropriate: allergies, current medications, past family history, past medical history, past social history, past surgical history and problem list.        ROS:     Review of Systems   Constitutional: Negative for activity change, appetite change, chills, diaphoresis, fatigue, fever and unexpected weight change.   HENT: Negative for mouth sores and tinnitus.    Eyes: Negative for discharge and visual disturbance.   Respiratory: Negative for cough, shortness of breath and wheezing.    Cardiovascular: Negative for chest pain, palpitations and leg swelling.   Gastrointestinal: Negative for abdominal  "pain, bloating, blood in stool, constipation, diarrhea, nausea and vomiting.   Endocrine: Negative for diabetes, hair loss, hot flashes, hyperthyroidism and hypothyroidism.   Genitourinary: Negative for dysuria, flank pain, frequency, genital sores, hematuria, urgency, vaginal bleeding, vaginal discharge, vaginal pain, postcoital bleeding and vaginal odor.   Musculoskeletal: Negative for back pain, joint swelling and myalgias.   Integumentary:  Negative for rash, acne, breast mass, nipple discharge and breast skin changes.   Neurological: Negative for seizures, syncope, numbness and headaches.   Hematological: Negative for adenopathy. Does not bruise/bleed easily.   Psychiatric/Behavioral: Negative for depression and sleep disturbance. The patient is not nervous/anxious.    Breast: Negative for mass, mastodynia, nipple discharge and skin changes      Objective:    Vital Signs:  Vitals:    03/24/22 1437   BP: 106/70   Pulse: 84   Resp: 13   Weight: 73.5 kg (162 lb)   Height: 5' 2" (1.575 m)         Physical Exam:  General:  alert, cooperative, appears stated age   Skin:  Skin color, texture, turgor normal. No rashes or lesions   HEENT:  conjunctivae/corneas clear. PERRL.   Neck: supple, trachea midline, no adenopathy or thyromegally   Respiratory:  clear to auscultation bilaterally   Heart:  regular rate and rhythm, S1, S2 normal, no murmur, click, rub or gallop   Breasts:  no discharge, erythema, or tenderness   Abdomen:  normal findings: bowel sounds normal, no masses palpable, no organomegaly and soft, non-tender   Pelvis: External genitalia: normal general appearance  Urinary system: urethral meatus normal, bladder nontender  Vaginal: normal mucosa without prolapse or lesions  Cervix: normal appearance  Uterus: normal size, shape, position  Adnexa: normal size, nontender bilaterally   Extremities: Normal ROM; no edema, no cyanosis   Neurologial: Normal strength and tone. No focal numbness or weakness. Reflexes 2+ " and equal.   Psychiatric: normal mood, speech, dress, and thought processes         Assessment:       Healthy female exam.     1. Well woman exam with routine gynecological exam    2. Encounter for screening mammogram for malignant neoplasm of breast          Plan:      Pap smear deferred   MMG ordered  RTC in 1 year    COUNSELING:  Irene was counseled on STD pevention, use and side-effects of various contraceptive measures, A.C.O.G. Pap guidelines and recommendations for yearly pelvic exams in addition to recommendations for monthly self breast exams; to see her PCP for other health maintenance.

## 2022-06-03 ENCOUNTER — HOSPITAL ENCOUNTER (OUTPATIENT)
Dept: RADIOLOGY | Facility: HOSPITAL | Age: 57
Discharge: HOME | End: 2022-06-03
Attending: OBSTETRICS & GYNECOLOGY
Payer: COMMERCIAL

## 2022-06-03 VITALS — WEIGHT: 162 LBS | HEIGHT: 62 IN | BODY MASS INDEX: 29.81 KG/M2

## 2022-06-03 DIAGNOSIS — Z12.31 ENCOUNTER FOR SCREENING MAMMOGRAM FOR MALIGNANT NEOPLASM OF BREAST: ICD-10-CM

## 2022-06-03 PROCEDURE — 77063 BREAST TOMOSYNTHESIS BI: CPT | Mod: 26,,, | Performed by: RADIOLOGY

## 2022-06-03 PROCEDURE — 77063 BREAST TOMOSYNTHESIS BI: CPT | Mod: TC

## 2022-06-03 PROCEDURE — 77063 MAMMO DIGITAL SCREENING BILAT WITH TOMO: ICD-10-PCS | Mod: 26,,, | Performed by: RADIOLOGY

## 2022-06-03 PROCEDURE — 77067 SCR MAMMO BI INCL CAD: CPT | Mod: 26,,, | Performed by: RADIOLOGY

## 2022-06-03 PROCEDURE — 77067 MAMMO DIGITAL SCREENING BILAT WITH TOMO: ICD-10-PCS | Mod: 26,,, | Performed by: RADIOLOGY

## 2022-06-03 PROCEDURE — 77067 SCR MAMMO BI INCL CAD: CPT | Mod: TC

## 2022-08-16 DIAGNOSIS — G62.0 NEUROPATHY DUE TO CHEMOTHERAPEUTIC DRUG: ICD-10-CM

## 2022-08-16 DIAGNOSIS — T45.1X5A NEUROPATHY DUE TO CHEMOTHERAPEUTIC DRUG: ICD-10-CM

## 2022-08-16 RX ORDER — PREGABALIN 150 MG/1
150 CAPSULE ORAL 2 TIMES DAILY
Qty: 60 CAPSULE | Refills: 11 | Status: SHIPPED | OUTPATIENT
Start: 2022-08-16 | End: 2023-08-16 | Stop reason: SDUPTHER

## 2022-09-12 ENCOUNTER — OFFICE VISIT (OUTPATIENT)
Dept: INTERNAL MEDICINE | Facility: CLINIC | Age: 57
End: 2022-09-12
Payer: COMMERCIAL

## 2022-09-12 VITALS
WEIGHT: 171.75 LBS | HEART RATE: 64 BPM | DIASTOLIC BLOOD PRESSURE: 62 MMHG | RESPIRATION RATE: 18 BRPM | BODY MASS INDEX: 31.6 KG/M2 | SYSTOLIC BLOOD PRESSURE: 90 MMHG | HEIGHT: 62 IN

## 2022-09-12 DIAGNOSIS — H92.02 LEFT EAR PAIN: Primary | ICD-10-CM

## 2022-09-12 DIAGNOSIS — C77.9 SECONDARY ADENOCARCINOMA OF LYMPH NODE: ICD-10-CM

## 2022-09-12 DIAGNOSIS — T45.1X5A NEUROPATHY DUE TO CHEMOTHERAPEUTIC DRUG: ICD-10-CM

## 2022-09-12 DIAGNOSIS — G62.0 NEUROPATHY DUE TO CHEMOTHERAPEUTIC DRUG: ICD-10-CM

## 2022-09-12 PROCEDURE — 1159F PR MEDICATION LIST DOCUMENTED IN MEDICAL RECORD: ICD-10-PCS | Mod: CPTII,S$GLB,, | Performed by: INTERNAL MEDICINE

## 2022-09-12 PROCEDURE — 1160F PR REVIEW ALL MEDS BY PRESCRIBER/CLIN PHARMACIST DOCUMENTED: ICD-10-PCS | Mod: CPTII,S$GLB,, | Performed by: INTERNAL MEDICINE

## 2022-09-12 PROCEDURE — 3078F PR MOST RECENT DIASTOLIC BLOOD PRESSURE < 80 MM HG: ICD-10-PCS | Mod: CPTII,S$GLB,, | Performed by: INTERNAL MEDICINE

## 2022-09-12 PROCEDURE — 1160F RVW MEDS BY RX/DR IN RCRD: CPT | Mod: CPTII,S$GLB,, | Performed by: INTERNAL MEDICINE

## 2022-09-12 PROCEDURE — 3008F PR BODY MASS INDEX (BMI) DOCUMENTED: ICD-10-PCS | Mod: CPTII,S$GLB,, | Performed by: INTERNAL MEDICINE

## 2022-09-12 PROCEDURE — 99213 PR OFFICE/OUTPT VISIT, EST, LEVL III, 20-29 MIN: ICD-10-PCS | Mod: S$GLB,,, | Performed by: INTERNAL MEDICINE

## 2022-09-12 PROCEDURE — 3078F DIAST BP <80 MM HG: CPT | Mod: CPTII,S$GLB,, | Performed by: INTERNAL MEDICINE

## 2022-09-12 PROCEDURE — 3008F BODY MASS INDEX DOCD: CPT | Mod: CPTII,S$GLB,, | Performed by: INTERNAL MEDICINE

## 2022-09-12 PROCEDURE — 1159F MED LIST DOCD IN RCRD: CPT | Mod: CPTII,S$GLB,, | Performed by: INTERNAL MEDICINE

## 2022-09-12 PROCEDURE — 3074F PR MOST RECENT SYSTOLIC BLOOD PRESSURE < 130 MM HG: ICD-10-PCS | Mod: CPTII,S$GLB,, | Performed by: INTERNAL MEDICINE

## 2022-09-12 PROCEDURE — 99999 PR PBB SHADOW E&M-EST. PATIENT-LVL III: ICD-10-PCS | Mod: PBBFAC,,, | Performed by: INTERNAL MEDICINE

## 2022-09-12 PROCEDURE — 3074F SYST BP LT 130 MM HG: CPT | Mod: CPTII,S$GLB,, | Performed by: INTERNAL MEDICINE

## 2022-09-12 PROCEDURE — 99213 OFFICE O/P EST LOW 20 MIN: CPT | Mod: S$GLB,,, | Performed by: INTERNAL MEDICINE

## 2022-09-12 PROCEDURE — 99999 PR PBB SHADOW E&M-EST. PATIENT-LVL III: CPT | Mod: PBBFAC,,, | Performed by: INTERNAL MEDICINE

## 2022-09-12 RX ORDER — NEOMYCIN SULFATE, POLYMYXIN B SULFATE AND HYDROCORTISONE 10; 3.5; 1 MG/ML; MG/ML; [USP'U]/ML
3 SUSPENSION/ DROPS AURICULAR (OTIC) 3 TIMES DAILY
Qty: 10 ML | Refills: 0 | Status: SHIPPED | OUTPATIENT
Start: 2022-09-12 | End: 2023-04-04

## 2022-09-12 NOTE — PROGRESS NOTES
Subjective:       Patient ID: Irene Galo is a 56 y.o. female.    Chief Complaint: Otalgia    She pulled out a ribbon of wax 2-4 weeks ago     Otalgia   There is pain in the left ear. This is a new problem. The current episode started 1 to 4 weeks ago. The problem has been waxing and waning. There has been no fever. The pain is at a severity of 3/10. The pain is moderate. Associated symptoms include coughing, hearing loss and a sore throat. Pertinent negatives include no ear discharge. She has tried NSAIDs for the symptoms. The treatment provided mild relief.   Review of Systems   HENT:  Positive for ear pain, hearing loss and sore throat. Negative for ear discharge.         Ringing in left ear .      S/p Stapedectomy 14 yrs ago   Respiratory:  Positive for cough.      Objective:      Physical Exam  HENT:      Left Ear: Decreased hearing noted. No drainage, swelling or tenderness.  No middle ear effusion. No foreign body. No mastoid tenderness.      Ears:      Comments: Some clotted blood on TM   Some hyperemia around TM       Assessment:       1. Left ear pain    2. Secondary adenocarcinoma of lymph node    3. Neuropathy due to chemotherapeutic drug        Plan:   Irene was seen today for otalgia.    Diagnoses and all orders for this visit:    Left ear pain  Likely otitis externa   Try   -     neomycin-polymyxin-hydrocortisone (CORTISPORIN) 3.5-10,000-1 mg/mL-unit/mL-% otic suspension; Place 3 drops into the left ear 3 (three) times daily.  If not better in 2 weeks ; call us : will refer to ENT then .          Secondary adenocarcinoma of lymph node  Seeing oncology.    Neuropathy due to chemotherapeutic drug   Continue lyrica    Problem List Items Addressed This Visit    None

## 2022-10-20 ENCOUNTER — TELEPHONE (OUTPATIENT)
Dept: HEMATOLOGY/ONCOLOGY | Facility: CLINIC | Age: 57
End: 2022-10-20
Payer: COMMERCIAL

## 2022-10-20 NOTE — TELEPHONE ENCOUNTER
"----- Message from Preeti Dorman sent at 10/20/2022  8:16 AM CDT -----  Regarding: Gosia "Nurse THORNE" [phone]  .Type: Patient Call Back    Who called: Gosia "Nurse THORNE"    What is the request in detail: Requesting to speak with nurse to see if pt is still being treated to use a certain type of equipment     Can the clinic reply by [facility]? Call back    Would the patient rather a call back or a response via [facility]? Call back    Best call back number [phone] ext [phone]         "

## 2022-10-20 NOTE — TELEPHONE ENCOUNTER
Spoke with [employer].   Informed them patient is not using ambulatory pump at this time.  She, dillon, thanked nurse.

## 2022-10-25 LAB
CHOLEST SERPL-MSCNC: 192 MG/DL (ref 0–200)
CHOLEST/HDLC SERPL: 3.6 {RATIO}
HDLC SERPL-MCNC: 54 MG/DL (ref 35–70)
LDLC SERPL CALC-MCNC: 125 MG/DL (ref 0–160)
TRIGL SERPL-MCNC: 68 MG/DL (ref 40–160)
VLDLC SERPL-MCNC: 13 MG/DL

## 2022-11-12 DIAGNOSIS — K21.9 ACID REFLUX: ICD-10-CM

## 2022-11-14 RX ORDER — OMEPRAZOLE 40 MG/1
40 CAPSULE, DELAYED RELEASE ORAL DAILY
Qty: 60 CAPSULE | Refills: 11 | Status: SHIPPED | OUTPATIENT
Start: 2022-11-14 | End: 2023-12-07 | Stop reason: SDUPTHER

## 2022-11-22 ENCOUNTER — PATIENT MESSAGE (OUTPATIENT)
Dept: INTERNAL MEDICINE | Facility: CLINIC | Age: 57
End: 2022-11-22
Payer: COMMERCIAL

## 2022-11-22 ENCOUNTER — PATIENT OUTREACH (OUTPATIENT)
Dept: ADMINISTRATIVE | Facility: HOSPITAL | Age: 57
End: 2022-11-22
Payer: COMMERCIAL

## 2022-11-22 ENCOUNTER — PATIENT MESSAGE (OUTPATIENT)
Dept: HEMATOLOGY/ONCOLOGY | Facility: CLINIC | Age: 57
End: 2022-11-22
Payer: COMMERCIAL

## 2022-11-22 NOTE — PROGRESS NOTES
Chart reviewed, immunization record updated.  No new results noted on [facility] or [facility] web site.  Care Everywhere updated.   Patient care coordination note updated.  LOV with PCP 9/12/2022.  Received from patient through patient portal Lipid Panel collected on 10/25/2022 from [facility]. Verified results from patient.

## 2023-02-03 ENCOUNTER — PATIENT MESSAGE (OUTPATIENT)
Dept: HEMATOLOGY/ONCOLOGY | Facility: CLINIC | Age: 58
End: 2023-02-03
Payer: COMMERCIAL

## 2023-04-04 ENCOUNTER — OFFICE VISIT (OUTPATIENT)
Dept: OBSTETRICS AND GYNECOLOGY | Facility: CLINIC | Age: 58
End: 2023-04-04
Payer: COMMERCIAL

## 2023-04-04 VITALS
WEIGHT: 177.31 LBS | RESPIRATION RATE: 18 BRPM | SYSTOLIC BLOOD PRESSURE: 130 MMHG | BODY MASS INDEX: 32.63 KG/M2 | DIASTOLIC BLOOD PRESSURE: 82 MMHG | HEART RATE: 64 BPM | HEIGHT: 62 IN | OXYGEN SATURATION: 97 %

## 2023-04-04 DIAGNOSIS — Z01.419 WELL WOMAN EXAM WITH ROUTINE GYNECOLOGICAL EXAM: Primary | ICD-10-CM

## 2023-04-04 DIAGNOSIS — Z12.31 BREAST CANCER SCREENING BY MAMMOGRAM: ICD-10-CM

## 2023-04-04 PROCEDURE — 3008F BODY MASS INDEX DOCD: CPT | Mod: CPTII,S$GLB,, | Performed by: OBSTETRICS & GYNECOLOGY

## 2023-04-04 PROCEDURE — 3075F SYST BP GE 130 - 139MM HG: CPT | Mod: CPTII,S$GLB,, | Performed by: OBSTETRICS & GYNECOLOGY

## 2023-04-04 PROCEDURE — 3079F PR MOST RECENT DIASTOLIC BLOOD PRESSURE 80-89 MM HG: ICD-10-PCS | Mod: CPTII,S$GLB,, | Performed by: OBSTETRICS & GYNECOLOGY

## 2023-04-04 PROCEDURE — 99999 PR PBB SHADOW E&M-EST. PATIENT-LVL III: ICD-10-PCS | Mod: PBBFAC,,, | Performed by: OBSTETRICS & GYNECOLOGY

## 2023-04-04 PROCEDURE — 99999 PR PBB SHADOW E&M-EST. PATIENT-LVL III: CPT | Mod: PBBFAC,,, | Performed by: OBSTETRICS & GYNECOLOGY

## 2023-04-04 PROCEDURE — 99396 PREV VISIT EST AGE 40-64: CPT | Mod: S$GLB,,, | Performed by: OBSTETRICS & GYNECOLOGY

## 2023-04-04 PROCEDURE — 1159F MED LIST DOCD IN RCRD: CPT | Mod: CPTII,S$GLB,, | Performed by: OBSTETRICS & GYNECOLOGY

## 2023-04-04 PROCEDURE — 99396 PR PREVENTIVE VISIT,EST,40-64: ICD-10-PCS | Mod: S$GLB,,, | Performed by: OBSTETRICS & GYNECOLOGY

## 2023-04-04 PROCEDURE — 1159F PR MEDICATION LIST DOCUMENTED IN MEDICAL RECORD: ICD-10-PCS | Mod: CPTII,S$GLB,, | Performed by: OBSTETRICS & GYNECOLOGY

## 2023-04-04 PROCEDURE — 1160F RVW MEDS BY RX/DR IN RCRD: CPT | Mod: CPTII,S$GLB,, | Performed by: OBSTETRICS & GYNECOLOGY

## 2023-04-04 PROCEDURE — 3079F DIAST BP 80-89 MM HG: CPT | Mod: CPTII,S$GLB,, | Performed by: OBSTETRICS & GYNECOLOGY

## 2023-04-04 PROCEDURE — 3075F PR MOST RECENT SYSTOLIC BLOOD PRESS GE 130-139MM HG: ICD-10-PCS | Mod: CPTII,S$GLB,, | Performed by: OBSTETRICS & GYNECOLOGY

## 2023-04-04 PROCEDURE — 1160F PR REVIEW ALL MEDS BY PRESCRIBER/CLIN PHARMACIST DOCUMENTED: ICD-10-PCS | Mod: CPTII,S$GLB,, | Performed by: OBSTETRICS & GYNECOLOGY

## 2023-04-04 PROCEDURE — 3008F PR BODY MASS INDEX (BMI) DOCUMENTED: ICD-10-PCS | Mod: CPTII,S$GLB,, | Performed by: OBSTETRICS & GYNECOLOGY

## 2023-04-04 NOTE — PROGRESS NOTES
Subjective:    Patient ID: Irene Galo is a 57 y.o. y.o. female.     Chief Complaint: Annual Well Woman Exam     History of Present Illness:  Irene presents today for Annual Well Woman exam. She describes her menses as  absent s/p chemo .She denies pelvic pain.  She denies breast tenderness, masses, nipple discharge. She denies difficulty with urination or bowel movements. She denies menopausal symptoms such as hotflashes, vaginal dryness, and night sweats. She denies bloating, early satiety, or weight changes. She is sexually active. Contraception is by  vasectomy.    Patient had colon resection with chemotherapy preformed in 2019. She reports since chemo she has had no cycles. No vasomotor symptoms.       The following portions of the patient's history were reviewed and updated as appropriate: allergies, current medications, past family history, past medical history, past social history, past surgical history and problem list.      Menstrual History:   Patient's last menstrual period was 2019..     OB History          4    Para   4    Term   4            AB        Living   5         SAB        IAB        Ectopic        Multiple   1    Live Births                     The following portions of the patient's history were reviewed and updated as appropriate: allergies, current medications, past family history, past medical history, past social history, past surgical history and problem list.        ROS:     Review of Systems   Constitutional:  Negative for activity change, appetite change, chills, diaphoresis, fatigue, fever and unexpected weight change.   HENT:  Negative for mouth sores and tinnitus.    Eyes:  Negative for discharge and visual disturbance.   Respiratory:  Negative for cough, shortness of breath and wheezing.    Cardiovascular:  Negative for chest pain, palpitations and leg swelling.   Gastrointestinal:  Negative for abdominal pain, bloating, blood in stool, constipation,  "diarrhea, nausea and vomiting.   Endocrine: Negative for diabetes, hair loss, hot flashes, hyperthyroidism and hypothyroidism.   Genitourinary:  Negative for dysuria, flank pain, frequency, genital sores, hematuria, urgency, vaginal bleeding, vaginal discharge, vaginal pain, postcoital bleeding and vaginal odor.   Musculoskeletal:  Negative for back pain, joint swelling and myalgias.   Integumentary:  Negative for rash, acne, breast mass, nipple discharge and breast skin changes.   Neurological:  Negative for seizures, syncope, numbness and headaches.   Hematological:  Negative for adenopathy. Does not bruise/bleed easily.   Psychiatric/Behavioral:  Negative for depression and sleep disturbance. The patient is not nervous/anxious.    Breast: Negative for mass, mastodynia, nipple discharge and skin changes    Objective:    Vital Signs:  Vitals:    04/04/23 1628   BP: 130/82   Pulse: 64   Resp: 18   SpO2: 97%   Weight: 80.4 kg (177 lb 4.8 oz)   Height: 5' 2" (1.575 m)         Physical Exam:  General:  alert, cooperative, appears stated age   Skin:  Skin color, texture, turgor normal. No rashes or lesions   HEENT:  conjunctivae/corneas clear. PERRL.   Neck: supple, trachea midline, no adenopathy or thyromegally   Respiratory:  clear to auscultation bilaterally   Heart:  regular rate and rhythm, S1, S2 normal, no murmur, click, rub or gallop   Breasts:  no discharge, erythema, or tenderness   Abdomen:  normal findings: bowel sounds normal, no masses palpable, no organomegaly and soft, non-tender   Pelvis: External genitalia: normal general appearance  Urinary system: urethral meatus normal, bladder nontender  Vaginal: normal mucosa without prolapse or lesions  Cervix: normal appearance  Uterus: normal size, shape, position  Adnexa: normal size, nontender bilaterally   Extremities: Normal ROM; no edema, no cyanosis   Neurologial: Normal strength and tone. No focal numbness or weakness. Reflexes 2+ and equal. "   Psychiatric: normal mood, speech, dress, and thought processes         Assessment:       Healthy female exam.     1. Well woman exam with routine gynecological exam    2. Breast cancer screening by mammogram          Plan:      Pap smear deferred   MMG ordered  Colonoscopy due at the end of the year  RTC in 1 year    COUNSELING:  Irene was counseled on STD pevention, use and side-effects of various contraceptive measures, A.C.O.G. Pap guidelines and recommendations for yearly pelvic exams in addition to recommendations for monthly self breast exams; to see her PCP for other health maintenance.

## 2023-06-05 ENCOUNTER — HOSPITAL ENCOUNTER (OUTPATIENT)
Dept: RADIOLOGY | Facility: HOSPITAL | Age: 58
Discharge: HOME | End: 2023-06-05
Attending: OBSTETRICS & GYNECOLOGY
Payer: COMMERCIAL

## 2023-06-05 VITALS — WEIGHT: 177 LBS | BODY MASS INDEX: 32.57 KG/M2 | HEIGHT: 62 IN

## 2023-06-05 DIAGNOSIS — Z12.31 BREAST CANCER SCREENING BY MAMMOGRAM: ICD-10-CM

## 2023-06-05 PROCEDURE — 77067 SCR MAMMO BI INCL CAD: CPT | Mod: 26,,, | Performed by: RADIOLOGY

## 2023-06-05 PROCEDURE — 77067 SCR MAMMO BI INCL CAD: CPT | Mod: TC

## 2023-06-05 PROCEDURE — 77063 MAMMO DIGITAL SCREENING BILAT WITH TOMO: ICD-10-PCS | Mod: 26,,, | Performed by: RADIOLOGY

## 2023-06-05 PROCEDURE — 77063 BREAST TOMOSYNTHESIS BI: CPT | Mod: 26,,, | Performed by: RADIOLOGY

## 2023-06-05 PROCEDURE — 77067 MAMMO DIGITAL SCREENING BILAT WITH TOMO: ICD-10-PCS | Mod: 26,,, | Performed by: RADIOLOGY

## 2023-07-08 ENCOUNTER — PATIENT MESSAGE (OUTPATIENT)
Dept: SURGERY | Facility: CLINIC | Age: 58
End: 2023-07-08
Payer: COMMERCIAL

## 2023-07-10 ENCOUNTER — TELEPHONE (OUTPATIENT)
Dept: ENDOSCOPY | Facility: HOSPITAL | Age: 58
End: 2023-07-10
Payer: COMMERCIAL

## 2023-07-10 DIAGNOSIS — Z12.11 SCREEN FOR COLON CANCER: Primary | ICD-10-CM

## 2023-07-10 NOTE — TELEPHONE ENCOUNTER
Received call from patient to schedule for a colonoscopy in October. Spoke to patient. Informed patient that our October procedure schedule is not available to schedule yet. Patient states she will call in a few weeks to schedule then.

## 2023-07-18 ENCOUNTER — TELEPHONE (OUTPATIENT)
Dept: ENDOSCOPY | Facility: HOSPITAL | Age: 58
End: 2023-07-18
Payer: COMMERCIAL

## 2023-07-18 VITALS — BODY MASS INDEX: 32.57 KG/M2 | HEIGHT: 62 IN | WEIGHT: 177 LBS

## 2023-07-18 DIAGNOSIS — Z12.11 SPECIAL SCREENING FOR MALIGNANT NEOPLASMS, COLON: Primary | ICD-10-CM

## 2023-07-18 DIAGNOSIS — Z86.010 HISTORY OF COLON POLYPS: ICD-10-CM

## 2023-07-18 NOTE — TELEPHONE ENCOUNTER
"Spoke to pt to schedule procedure(s) Colonoscopy       Physician to perform procedure(s) Dr. EZRA Augustin  Date of Procedure (s) 10/19/23  Arrival Time 7:40 AM  Time of Procedure(s) 8:40 AM   Location of Procedure(s) [facility] [address] [facility]  Type of Rx Prep sent to patient: PEG  Instructions provided to patient via [facility]    Patient was informed on the following information and verbalized understanding. Screening questionnaire reviewed with patient and complete. If procedure requires anesthesia, a responsible adult needs to be present to accompany the patient home, patient cannot drive after receiving anesthesia. Appointment details are tentative, especially check-in time. Patient will receive a prep-op call 4 days prior to confirm check-in time for procedure. If applicable the patient should contact their pharmacy to verify Rx for procedure prep is ready for pick-up. Patient was advised to call the scheduling department at [phone] if pharmacy states no Rx is available. Patient was advised to call the endoscopy scheduling department if any questions or concerns arise.       Endoscopy Scheduling Department           ----- Message -----   From: Tommy Madrigal RN   Sent: 7/10/2023   8:13 AM CDT   To: [facility]     Procedure: Colonoscopy     Diagnosis: Surveillance colonoscopy - Hx of colon polyps     Procedure Timin-12 weeks     *If within 4 weeks selected, please rubi as high priority*     *If greater than 12 weeks, please select "5-12 weeks" and delay sending until 2 months prior to requested date*     Provider: Demetria     Location: [address]     Additional Scheduling Information: No scheduling concerns     Prep Specifications:Standard prep     Have you attached a patient to this message: yes   "

## 2023-08-04 ENCOUNTER — PATIENT MESSAGE (OUTPATIENT)
Dept: ENDOSCOPY | Facility: HOSPITAL | Age: 58
End: 2023-08-04
Payer: COMMERCIAL

## 2023-08-04 RX ORDER — SODIUM, POTASSIUM,MAG SULFATES 17.5-3.13G
1 SOLUTION, RECONSTITUTED, ORAL ORAL DAILY
Qty: 354 ML | Refills: 0 | Status: SHIPPED | OUTPATIENT
Start: 2023-08-04 | End: 2023-08-06

## 2023-08-16 DIAGNOSIS — G62.0 NEUROPATHY DUE TO CHEMOTHERAPEUTIC DRUG: ICD-10-CM

## 2023-08-16 DIAGNOSIS — T45.1X5A NEUROPATHY DUE TO CHEMOTHERAPEUTIC DRUG: ICD-10-CM

## 2023-08-16 RX ORDER — PREGABALIN 150 MG/1
150 CAPSULE ORAL 2 TIMES DAILY
Qty: 60 CAPSULE | Refills: 11 | Status: SHIPPED | OUTPATIENT
Start: 2023-08-16 | End: 2024-02-08

## 2023-09-19 ENCOUNTER — PATIENT MESSAGE (OUTPATIENT)
Dept: ADMINISTRATIVE | Facility: HOSPITAL | Age: 58
End: 2023-09-19
Payer: COMMERCIAL

## 2023-10-02 ENCOUNTER — OFFICE VISIT (OUTPATIENT)
Dept: INTERNAL MEDICINE | Facility: CLINIC | Age: 58
End: 2023-10-02
Payer: COMMERCIAL

## 2023-10-02 VITALS
BODY MASS INDEX: 32.25 KG/M2 | WEIGHT: 175.25 LBS | HEIGHT: 62 IN | OXYGEN SATURATION: 97 % | HEART RATE: 60 BPM | SYSTOLIC BLOOD PRESSURE: 124 MMHG | DIASTOLIC BLOOD PRESSURE: 64 MMHG | RESPIRATION RATE: 18 BRPM

## 2023-10-02 DIAGNOSIS — T45.1X5A NEUROPATHY DUE TO CHEMOTHERAPEUTIC DRUG: ICD-10-CM

## 2023-10-02 DIAGNOSIS — G62.0 NEUROPATHY DUE TO CHEMOTHERAPEUTIC DRUG: ICD-10-CM

## 2023-10-02 PROBLEM — C77.9 SECONDARY ADENOCARCINOMA OF LYMPH NODE: Status: RESOLVED | Noted: 2019-03-22 | Resolved: 2023-10-02

## 2023-10-02 PROCEDURE — 3008F PR BODY MASS INDEX (BMI) DOCUMENTED: ICD-10-PCS | Mod: CPTII,S$GLB,, | Performed by: NURSE PRACTITIONER

## 2023-10-02 PROCEDURE — 1160F RVW MEDS BY RX/DR IN RCRD: CPT | Mod: CPTII,S$GLB,, | Performed by: NURSE PRACTITIONER

## 2023-10-02 PROCEDURE — 99999 PR PBB SHADOW E&M-EST. PATIENT-LVL III: CPT | Mod: PBBFAC,,, | Performed by: NURSE PRACTITIONER

## 2023-10-02 PROCEDURE — 3078F DIAST BP <80 MM HG: CPT | Mod: CPTII,S$GLB,, | Performed by: NURSE PRACTITIONER

## 2023-10-02 PROCEDURE — 1159F MED LIST DOCD IN RCRD: CPT | Mod: CPTII,S$GLB,, | Performed by: NURSE PRACTITIONER

## 2023-10-02 PROCEDURE — 1159F PR MEDICATION LIST DOCUMENTED IN MEDICAL RECORD: ICD-10-PCS | Mod: CPTII,S$GLB,, | Performed by: NURSE PRACTITIONER

## 2023-10-02 PROCEDURE — 3074F SYST BP LT 130 MM HG: CPT | Mod: CPTII,S$GLB,, | Performed by: NURSE PRACTITIONER

## 2023-10-02 PROCEDURE — 3074F PR MOST RECENT SYSTOLIC BLOOD PRESSURE < 130 MM HG: ICD-10-PCS | Mod: CPTII,S$GLB,, | Performed by: NURSE PRACTITIONER

## 2023-10-02 PROCEDURE — 99213 PR OFFICE/OUTPT VISIT, EST, LEVL III, 20-29 MIN: ICD-10-PCS | Mod: S$GLB,,, | Performed by: NURSE PRACTITIONER

## 2023-10-02 PROCEDURE — 3008F BODY MASS INDEX DOCD: CPT | Mod: CPTII,S$GLB,, | Performed by: NURSE PRACTITIONER

## 2023-10-02 PROCEDURE — 1160F PR REVIEW ALL MEDS BY PRESCRIBER/CLIN PHARMACIST DOCUMENTED: ICD-10-PCS | Mod: CPTII,S$GLB,, | Performed by: NURSE PRACTITIONER

## 2023-10-02 PROCEDURE — 99213 OFFICE O/P EST LOW 20 MIN: CPT | Mod: S$GLB,,, | Performed by: NURSE PRACTITIONER

## 2023-10-02 PROCEDURE — 99999 PR PBB SHADOW E&M-EST. PATIENT-LVL III: ICD-10-PCS | Mod: PBBFAC,,, | Performed by: NURSE PRACTITIONER

## 2023-10-02 PROCEDURE — 3078F PR MOST RECENT DIASTOLIC BLOOD PRESSURE < 80 MM HG: ICD-10-PCS | Mod: CPTII,S$GLB,, | Performed by: NURSE PRACTITIONER

## 2023-10-02 RX ORDER — PREGABALIN 75 MG/1
75 CAPSULE ORAL 2 TIMES DAILY
Qty: 30 CAPSULE | Refills: 1 | Status: SHIPPED | OUTPATIENT
Start: 2023-10-02 | End: 2023-11-27 | Stop reason: SDUPTHER

## 2023-10-02 NOTE — PROGRESS NOTES
Subjective:       Patient ID: Irene Galo is a 57 y.o. female.    Chief Complaint: Establish Care    HPI: Pt presents to clinic today new to me but known to Dr Madden. Attempting to switch from male to female provider. She has repeat colonoscopy 10/19/23 scheduled  colonoscopy. She had a positive cologuard then then after positive was sent for colonoscopy then had resection. She did have some in lymph nodes so had 6 months treatments of chemo (every 2 weeks) completed 2019 no radiation. She has neuropathy in bilateral feet and habds- it is better now than when it started but still has t. Worse in lower ext. Makes her a little clumbsy- would like to reduce her lyrica   but Dr Arredondo her oncologist would like to keep her where she is     She is past due for labs she is retired. 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   HENT:  Negative for congestion, ear pain, sore throat and trouble swallowing.    Eyes:  Negative for pain and visual disturbance.   Respiratory:  Negative for cough, chest tightness and shortness of breath.    Cardiovascular:  Negative for chest pain, palpitations and leg swelling.   Gastrointestinal:  Negative for abdominal distention, abdominal pain, constipation, diarrhea and vomiting.   Genitourinary:  Negative for difficulty urinating, dysuria, flank pain, frequency and hematuria.   Musculoskeletal:  Negative for back pain, gait problem, joint swelling, neck pain and neck stiffness.   Skin:  Negative for rash and wound.   Neurological:  Negative for dizziness, seizures, speech difficulty, light-headedness and headaches.       Objective:      Physical Exam  Vitals and nursing note reviewed.   Constitutional:       Appearance: She is well-developed. She is obese.   HENT:      Head: Normocephalic and atraumatic.      Right Ear: External ear normal.       Left Ear: External ear normal.      Nose: Nose normal.   Eyes:      Conjunctiva/sclera: Conjunctivae normal.      Pupils: Pupils are equal, round, and reactive to light.   Cardiovascular:      Rate and Rhythm: Normal rate and regular rhythm.      Heart sounds: Normal heart sounds. No murmur heard.  Pulmonary:      Effort: Pulmonary effort is normal. No respiratory distress.      Breath sounds: Normal breath sounds. No wheezing.   Abdominal:      General: Bowel sounds are normal.      Palpations: Abdomen is soft.   Musculoskeletal:         General: No swelling. Normal range of motion.      Cervical back: Normal range of motion and neck supple.   Skin:     General: Skin is warm and dry.   Neurological:      Mental Status: She is alert and oriented to person, place, and time.   Psychiatric:         Behavior: Behavior normal.         Thought Content: Thought content normal.         Judgment: Judgment normal.         Assessment:       1. Neuropathy due to chemotherapeutic drug        Plan:     Problem List Items Addressed This Visit       Neuropathy due to chemotherapeutic drug     Cont f/u with oncology> yearly and  every 3 years for colonoscopy    Will bring in labs from health screens     Shingrix at pharmacy    Declines flu shot    Will send a short rx of lyrica because she would like to wean it. She will cont lyrica 150 ain evening but can try and wean to 75 in am.

## 2023-10-12 ENCOUNTER — PATIENT MESSAGE (OUTPATIENT)
Dept: ENDOSCOPY | Facility: HOSPITAL | Age: 58
End: 2023-10-12
Payer: COMMERCIAL

## 2023-10-12 ENCOUNTER — TELEPHONE (OUTPATIENT)
Dept: ENDOSCOPY | Facility: HOSPITAL | Age: 58
End: 2023-10-12
Payer: COMMERCIAL

## 2023-10-18 ENCOUNTER — TELEPHONE (OUTPATIENT)
Dept: ENDOSCOPY | Facility: HOSPITAL | Age: 58
End: 2023-10-18
Payer: COMMERCIAL

## 2023-10-19 ENCOUNTER — ANESTHESIA EVENT (OUTPATIENT)
Dept: ENDOSCOPY | Facility: HOSPITAL | Age: 58
End: 2023-10-19
Payer: COMMERCIAL

## 2023-10-19 ENCOUNTER — HOSPITAL ENCOUNTER (OUTPATIENT)
Facility: HOSPITAL | Age: 58
Discharge: HOME | End: 2023-10-19
Attending: COLON & RECTAL SURGERY | Admitting: COLON & RECTAL SURGERY
Payer: COMMERCIAL

## 2023-10-19 ENCOUNTER — ANESTHESIA (OUTPATIENT)
Dept: ENDOSCOPY | Facility: HOSPITAL | Age: 58
End: 2023-10-19
Payer: COMMERCIAL

## 2023-10-19 VITALS
TEMPERATURE: 98 F | RESPIRATION RATE: 18 BRPM | HEART RATE: 55 BPM | WEIGHT: 168 LBS | DIASTOLIC BLOOD PRESSURE: 67 MMHG | SYSTOLIC BLOOD PRESSURE: 102 MMHG | HEIGHT: 62 IN | OXYGEN SATURATION: 99 % | BODY MASS INDEX: 30.91 KG/M2

## 2023-10-19 DIAGNOSIS — Z85.038 HISTORY OF COLON CANCER: Primary | ICD-10-CM

## 2023-10-19 PROCEDURE — 37000008 HC ANESTHESIA 1ST 15 MINUTES: Performed by: COLON & RECTAL SURGERY

## 2023-10-19 PROCEDURE — 37000009 HC ANESTHESIA EA ADD 15 MINS: Performed by: COLON & RECTAL SURGERY

## 2023-10-19 PROCEDURE — G0121 COLON CA SCRN NOT HI RSK IND: HCPCS | Mod: ,,, | Performed by: COLON & RECTAL SURGERY

## 2023-10-19 PROCEDURE — 63600175 PHARM REV CODE 636 W HCPCS: Performed by: NURSE ANESTHETIST, CERTIFIED REGISTERED

## 2023-10-19 PROCEDURE — 27201012 HC FORCEPS, HOT/COLD, DISP: Performed by: COLON & RECTAL SURGERY

## 2023-10-19 PROCEDURE — E9220 PRA ENDO ANESTHESIA: ICD-10-PCS | Mod: ,,, | Performed by: NURSE ANESTHETIST, CERTIFIED REGISTERED

## 2023-10-19 PROCEDURE — G0121 COLON CA SCRN NOT HI RSK IND: HCPCS | Performed by: COLON & RECTAL SURGERY

## 2023-10-19 PROCEDURE — E9220 PRA ENDO ANESTHESIA: HCPCS | Mod: ,,, | Performed by: NURSE ANESTHETIST, CERTIFIED REGISTERED

## 2023-10-19 PROCEDURE — 25000003 PHARM REV CODE 250: Performed by: NURSE ANESTHETIST, CERTIFIED REGISTERED

## 2023-10-19 PROCEDURE — G0121 COLON CA SCRN NOT HI RSK IND: ICD-10-PCS | Mod: ,,, | Performed by: COLON & RECTAL SURGERY

## 2023-10-19 RX ORDER — SODIUM CHLORIDE 9 MG/ML
INJECTION, SOLUTION INTRAVENOUS CONTINUOUS
Status: DISCONTINUED | OUTPATIENT
Start: 2023-10-19 | End: 2023-10-19 | Stop reason: HOSPADM

## 2023-10-19 RX ORDER — PROPOFOL 10 MG/ML
VIAL (ML) INTRAVENOUS
Status: DISCONTINUED | OUTPATIENT
Start: 2023-10-19 | End: 2023-10-19

## 2023-10-19 RX ORDER — LIDOCAINE HYDROCHLORIDE 20 MG/ML
INJECTION INTRAVENOUS
Status: DISCONTINUED | OUTPATIENT
Start: 2023-10-19 | End: 2023-10-19

## 2023-10-19 RX ORDER — PROPOFOL 10 MG/ML
VIAL (ML) INTRAVENOUS CONTINUOUS PRN
Status: DISCONTINUED | OUTPATIENT
Start: 2023-10-19 | End: 2023-10-19

## 2023-10-19 RX ADMIN — LIDOCAINE HYDROCHLORIDE 50 MG: 20 INJECTION INTRAVENOUS at 08:10

## 2023-10-19 RX ADMIN — PROPOFOL 100 MG: 10 INJECTION, EMULSION INTRAVENOUS at 08:10

## 2023-10-19 RX ADMIN — PROPOFOL 150 MCG/KG/MIN: 10 INJECTION, EMULSION INTRAVENOUS at 08:10

## 2023-10-19 RX ADMIN — SODIUM CHLORIDE: 9 INJECTION, SOLUTION INTRAVENOUS at 08:10

## 2023-10-19 NOTE — ANESTHESIA PREPROCEDURE EVALUATION
10/19/2023  Pre-operative evaluation for Procedure(s) (LRB):  COLONOSCOPY (N/A)    Irene Galo is a 57 y.o. female     Patient Active Problem List   Diagnosis    Fibroids    Heart burn    History of colon cancer    Neuropathy due to chemotherapeutic drug       Review of patient's allergies indicates:   Allergen Reactions    No known drug allergies        No current facility-administered medications on file prior to encounter.     Current Outpatient Medications on File Prior to Encounter   Medication Sig Dispense Refill    cyanocobalamin (VITAMIN B-12) 1000 MCG tablet Take 1 tablet (1,000 mcg total) by mouth once daily. 30 tablet 6    ibuprofen (ADVIL,MOTRIN) 200 MG tablet Take 200 mg by mouth every 6 (six) hours as needed for Pain.      multivitamin with minerals tablet Take 1 tablet by mouth once daily.      omeprazole (PRILOSEC) 40 MG capsule Take 1 capsule (40 mg total) by mouth once daily. 60 capsule 11    pyridoxine, vitamin B6, (B-6) 100 MG Tab Take 4 tablets (400 mg total) by mouth once daily. 120 tablet 4       Past Surgical History:   Procedure Laterality Date    COLONOSCOPY N/A 1/10/2019    Procedure: COLONOSCOPY WITH BIOPSY;  Surgeon: Zachery Augustin MD;  Location: [facility];  Service: Endoscopy;  Laterality: N/A;    COLONOSCOPY N/A 10/31/2019    Procedure: COLONOSCOPY;  Surgeon: Zachery Augustin MD;  Location: [facility];  Service: Colon and Rectal;  Laterality: N/A;    COLONOSCOPY N/A 10/26/2021    Procedure: COLONOSCOPY;  Surgeon: Zachery Augustin MD;  Location: [facility] (4TH FLR);  Service: Colon and Rectal;  Laterality: N/A;  / prep ins. emailed- ERW.Covid Positive. on 10/4    INSERTION OF TUNNELED CENTRAL VENOUS CATHETER (CVC) WITH SUBCUTANEOUS PORT N/A 3/28/2019    Procedure: [id number];  Surgeon: Vladimir Bernal Jr., MD;  Location: [facility] OR 2ND FLR;  Service:  General;  Laterality: N/A;    lasix  2000    both eyes    MOBILIZATION OF SPLENIC FLEXURE  2/15/2019    Procedure: MOBILIZATION, SPLENIC FLEXURE;  Surgeon: Zachery Augustin MD;  Location: [facility] OR 2ND FLR;  Service: Colon and Rectal;;    ROBOT-ASSISTED LAPAROSCOPIC RESECTION OF SIGMOID COLON USING DA OLE XI N/A 2/15/2019    Procedure: XI ROBOTIC COLECTOMY, SIGMOID;  Surgeon: Zachery Augustin MD;  Location: [facility] OR 2ND FLR;  Service: Colon and Rectal;  Laterality: N/A;    STAPEDECTOMY  2009       Social History     Socioeconomic History    Marital status:    Tobacco Use    Smoking status: Never    Smokeless tobacco: Never   Substance and Sexual Activity    Alcohol use: Yes     Comment: 1 glass of wine , 2-3 times a week    Drug use: No    Sexual activity: Yes     Partners: Male     Birth control/protection: Other-see comments     Comment: -- with vasectomy     Social Determinants of Health     Financial Resource Strain: Low Risk  (10/2/2023)    Overall Financial Resource Strain (CARDIA)     Difficulty of Paying Living Expenses: Not hard at all   Food Insecurity: No Food Insecurity (10/2/2023)    Hunger Vital Sign     Worried About Running Out of Food in the Last Year: Never true     Ran Out of Food in the Last Year: Never true   Transportation Needs: No Transportation Needs (10/2/2023)    PRAPARE - Transportation     Lack of Transportation (Medical): No     Lack of Transportation (Non-Medical): No   Physical Activity: Insufficiently Active (10/2/2023)    Exercise Vital Sign     Days of Exercise per Week: 3 days     Minutes of Exercise per Session: 30 min   Stress: No Stress Concern Present (10/2/2023)    [employer] [place] [employer] - Occupational Stress Questionnaire     Feeling of Stress : Not at all   Social Connections: Unknown (10/2/2023)    Social Connection and Isolation Panel [NHANES]     Frequency of Communication with Friends and Family: Twice a week      "Frequency of Social Gatherings with Friends and Family: Twice a week     Active Member of Clubs or Organizations: Yes     Attends Club or Organization Meetings: More than 4 times per year     Marital Status:    Housing Stability: Low Risk  (10/2/2023)    Housing Stability Vital Sign     Unable to Pay for Housing in the Last Year: No     Number of Places Lived in the Last Year: 1     Unstable Housing in the Last Year: No         CBC: No results for input(s): "WBC", "RBC", "HGB", "HCT", "PLT", "MCV", "MCH", "MCHC" in the last 72 hours.    CMP: No results for input(s): "NA", "K", "CL", "CO2", "BUN", "CREATININE", "GLU", "MG", "PHOS", "CALCIUM", "ALBUMIN", "PROT", "ALKPHOS", "ALT", "AST", "BILITOT" in the last 72 hours.    INR  No results for input(s): "PT", "INR", "PROTIME", "APTT" in the last 72 hours.        Diagnostic Studies:      EKD Echo:  No results found for this or any previous visit.        Pre-op Assessment    I have reviewed the Patient Summary Reports.     I have reviewed the Nursing Notes. I have reviewed the NPO Status.      Review of Systems  Hematology/Oncology:         -- Cancer in past history:   EENT/Dental:EENT/Dental Normal   Pulmonary:  Pulmonary Normal    Hepatic/GI:   GERD    Musculoskeletal:  Musculoskeletal Normal    Neurological:  Neurology Normal    Endocrine:  Obesity / BMI > 30  Psych:  Psychiatric Normal           Physical Exam  General: Well nourished, Cooperative and Alert    Airway:  Mallampati: II   Mouth Opening: Normal  TM Distance: Normal          Anesthesia Plan  Type of Anesthesia, risks & benefits discussed:    Anesthesia Type: Gen Natural Airway  Intra-op Monitoring Plan: Standard ASA Monitors  Informed Consent: Informed consent signed with the Patient and all parties understand the risks and agree with anesthesia plan.  All questions answered.   ASA Score: 2  Day of Surgery Review of History & Physical: H&P Update referred to the " surgeon/provider.    Ready For Surgery From Anesthesia Perspective.     .

## 2023-10-19 NOTE — TRANSFER OF CARE
"Anesthesia Transfer of Care Note    Patient: Irene Galo    Procedure(s) Performed: Procedure(s) (LRB):  COLONOSCOPY (N/A)    Patient location: GI    Anesthesia Type: general    Transport from OR: Transported from OR on room air with adequate spontaneous ventilation    Post pain: adequate analgesia    Post assessment: no apparent anesthetic complications and tolerated procedure well    Post vital signs: stable    Level of consciousness: awake, alert and responds to stimulation    Nausea/Vomiting: no nausea/vomiting    Complications: none    Transfer of care protocol was followedComments: Report given to recovery RN.       Last vitals:   Visit Vitals  BP 97/63 (BP Location: Left arm, Patient Position: Lying)   Pulse 61   Temp 36.7 °C (98.1 °F) (Temporal)   Resp 14   Ht 5' 2" (1.575 m)   Wt 76.2 kg (168 lb)   LMP 02/28/2019   SpO2 100%   Breastfeeding No   BMI 30.73 kg/m²     "

## 2023-10-19 NOTE — H&P
Procedure : Colonoscopy    Indication(s):  personal history of colon cancer    Last colonoscopy: 2021 - normal    Review of patient's allergies indicates:   Allergen Reactions    No known drug allergies        Past Medical History:   Diagnosis Date    Allergy     Bilateral hearing loss     Colon cancer     GERD (gastroesophageal reflux disease)     Uterine fibroid        Prior to Admission medications    Medication Sig Start Date End Date Taking? Authorizing Provider   cyanocobalamin (VITAMIN B-12) 1000 MCG tablet Take 1 tablet (1,000 mcg total) by mouth once daily. 10/21/19  Yes Sheila Welch MD   multivitamin with minerals tablet Take 1 tablet by mouth once daily.   Yes Provider, Historical   omeprazole (PRILOSEC) 40 MG capsule Take 1 capsule (40 mg total) by mouth once daily. 11/14/22 11/16/23 Yes Sheila Welch MD   pregabalin (LYRICA) 150 MG capsule Take 1 capsule (150 mg total) by mouth 2 (two) times daily. 8/16/23 2/14/24 Yes Sheila Welch MD   pregabalin (LYRICA) 75 MG capsule Take 1 capsule (75 mg total) by mouth 2 (two) times daily. 10/2/23 4/1/24 Yes Kayleigh Kauffman NP   pyridoxine, vitamin B6, (B-6) 100 MG Tab Take 4 tablets (400 mg total) by mouth once daily. 10/21/19  Yes Sheila Welch MD   ibuprofen (ADVIL,MOTRIN) 200 MG tablet Take 200 mg by mouth every 6 (six) hours as needed for Pain.    Provider, Historical       Sedation Problems: NO    Family History   Problem Relation Age of Onset    Diverticulitis Father     Lung cancer Father     Breast cancer Paternal Grandmother 70    Stroke Mother     Heart disease Brother 45        Tobacco smoker     Diabetes Brother     Diabetes Brother        Fam Hx of Sedation Problems: NO    Social History     Socioeconomic History    Marital status:    Tobacco Use    Smoking status: Never    Smokeless tobacco: Never   Substance and Sexual Activity    Alcohol use: Yes     Comment: 1 glass of wine , 2-3 times a week    Drug use: No    Sexual activity:  Yes     Partners: Male     Birth control/protection: Other-see comments     Comment: -- with vasectomy     Social Determinants of Health     Financial Resource Strain: Low Risk  (10/2/2023)    Overall Financial Resource Strain (CARDIA)     Difficulty of Paying Living Expenses: Not hard at all   Food Insecurity: No Food Insecurity (10/2/2023)    Hunger Vital Sign     Worried About Running Out of Food in the Last Year: Never true     Ran Out of Food in the Last Year: Never true   Transportation Needs: No Transportation Needs (10/2/2023)    PRAPARE - Transportation     Lack of Transportation (Medical): No     Lack of Transportation (Non-Medical): No   Physical Activity: Insufficiently Active (10/2/2023)    Exercise Vital Sign     Days of Exercise per Week: 3 days     Minutes of Exercise per Session: 30 min   Stress: No Stress Concern Present (10/2/2023)    [employer] [place] [employer] - Occupational Stress Questionnaire     Feeling of Stress : Not at all   Social Connections: Unknown (10/2/2023)    Social Connection and Isolation Panel [NHANES]     Frequency of Communication with Friends and Family: Twice a week     Frequency of Social Gatherings with Friends and Family: Twice a week     Active Member of Clubs or Organizations: Yes     Attends Club or Organization Meetings: More than 4 times per year     Marital Status:    Housing Stability: Low Risk  (10/2/2023)    Housing Stability Vital Sign     Unable to Pay for Housing in the Last Year: No     Number of Places Lived in the Last Year: 1     Unstable Housing in the Last Year: No       Review of Systems -     Respiratory ROS: no cough, shortness of breath, or wheezing  Cardiovascular ROS: no chest pain or dyspnea on exertion  Gastrointestinal ROS: no abdominal pain, change in bowel habits, or black or bloody stools  Musculoskeletal ROS: negative  Neurological ROS: no TIA or stroke symptoms        Physical Exam:  General: no  distress  Head: normocephalic  Airway:  normal oropharynx, airway normal  Neck: supple, symmetrical, trachea midline  Lungs:  normal respiratory effort  Heart: regular rate and rhythm  Abdomen: soft, non-tender non-distented; bowel sounds normal; no masses,  no organomegaly  Extremities: no cyanosis or edema, or clubbing       Deep Sedation: Mallampati Score per anesthesia     SedationPlan :Moderate     ASA : II    Patient is medically cleared for anesthesia.    Anesthesia/Surgery risks, benefits and alternative options discussed and understood by patient/family.

## 2023-10-19 NOTE — PROVATION PATIENT INSTRUCTIONS
Discharge Summary/Instructions after an Endoscopic Procedure  Patient Name: Irene Galo  Patient MRN: [id number]  Patient YOB: 1965 Thursday, October 19, 2023  Zachery Augustin MD  Dear patient,  As a result of recent federal legislation (The Federal Cures Act), you may   receive lab or pathology results from your procedure in your [facility]   account before your physician is able to contact you. Your physician or   their representative will relay the results to you with their   recommendations at their soonest availability.  Thank you,  RESTRICTIONS:  During your procedure today, you received medications for sedation.  These   medications may affect your judgment, balance and coordination.  Therefore,   for 24 hours, you have the following restrictions:   - DO NOT drive a car, operate machinery, make legal/financial decisions,   sign important papers or drink alcohol.    ACTIVITY:  Today: no heavy lifting, straining or running due to procedural   sedation/anesthesia.  The following day: return to full activity including work.  DIET:  Eat and drink normally unless instructed otherwise.     TREATMENT FOR COMMON SIDE EFFECTS:  - Mild abdominal pain, nausea, belching, bloating or excessive gas:  rest,   eat lightly and use a heating pad.  - Sore Throat: treat with throat lozenges and/or gargle with warm salt   water.  - Because air was used during the procedure, expelling large amounts of air   from your rectum or belching is normal.  - If a bowel prep was taken, you may not have a bowel movement for 1-3 days.    This is normal.  SYMPTOMS TO WATCH FOR AND REPORT TO YOUR PHYSICIAN:  1. Abdominal pain or bloating, other than gas cramps.  2. Chest pain.  3. Back pain.  4. Signs of infection such as: chills or fever occurring within 24 hours   after the procedure.  5. Rectal bleeding, which would show as bright red, maroon, or black stools.   (A tablespoon of blood from the rectum is not serious, especially if    hemorrhoids are present.)  6. Vomiting.  7. Weakness or dizziness.  GO DIRECTLY TO THE NEAREST EMERGENCY ROOM IF YOU HAVE ANY OF THE FOLLOWING:      Difficulty breathing              Chills and/or fever over 101 F   Persistent vomiting and/or vomiting blood   Severe abdominal pain   Severe chest pain   Black, tarry stools   Bleeding- more than one tablespoon   Any other symptom or condition that you feel may need urgent attention  Your doctor recommends these additional instructions:  If any biopsies were taken, your doctors clinic will contact you in 1 to 2   weeks with any results.  - Discharge patient to home.   - High fiber diet.   - Continue present medications.   - Patient has a contact number available for emergencies.  The signs and   symptoms of potential delayed complications were discussed with the   patient.  Return to normal activities tomorrow.  Written discharge   instructions were provided to the patient.   - Repeat colonoscopy in 1 year for surveillance.  For questions, problems or results please call your physician - Zachery Augustin MD at Work:  [phone].  [facility], EMERGENCY ROOM PHONE NUMBER: [phone]  IF A COMPLICATION OR EMERGENCY SITUATION ARISES AND YOU ARE UNABLE TO REACH   YOUR PHYSICIAN - GO DIRECTLY TO THE EMERGENCY ROOM.  Zachery Augustin MD  10/19/2023 8:57:31 AM  This report has been verified and signed electronically.  Dear patient,  As a result of recent federal legislation (The Federal Cures Act), you may   receive lab or pathology results from your procedure in your [facility]   account before your physician is able to contact you. Your physician or   their representative will relay the results to you with their   recommendations at their soonest availability.  Thank you,  PROVATION

## 2023-10-19 NOTE — ANESTHESIA PREPROCEDURE EVALUATION
10/19/2023  Irene Galo is a 57 y.o., female.  Past Medical History:   Diagnosis Date    Allergy     Bilateral hearing loss     Colon cancer     GERD (gastroesophageal reflux disease)     Uterine fibroid      Past Surgical History:   Procedure Laterality Date    COLONOSCOPY N/A 1/10/2019    Procedure: COLONOSCOPY WITH BIOPSY;  Surgeon: Zachery Augustin MD;  Location: [facility] ENDO;  Service: Endoscopy;  Laterality: N/A;    COLONOSCOPY N/A 10/31/2019    Procedure: COLONOSCOPY;  Surgeon: Zachery Augustin MD;  Location: [facility] ENDO;  Service: Colon and Rectal;  Laterality: N/A;    COLONOSCOPY N/A 10/26/2021    Procedure: COLONOSCOPY;  Surgeon: Zachery Augustin MD;  Location: [facility] ENDO (4TH FLR);  Service: Colon and Rectal;  Laterality: N/A;  / prep ins. emailed- ERW.Covid Positive. on 10/4    INSERTION OF TUNNELED CENTRAL VENOUS CATHETER (CVC) WITH SUBCUTANEOUS PORT N/A 3/28/2019    Procedure: LIEKVEIHK-LJAH-Y-CATH;  Surgeon: Vladimir Bernal Jr., MD;  Location: [facility] OR 2ND FLR;  Service: General;  Laterality: N/A;    lasix  2000    both eyes    MOBILIZATION OF SPLENIC FLEXURE  2/15/2019    Procedure: MOBILIZATION, SPLENIC FLEXURE;  Surgeon: Zachery Augustin MD;  Location: [facility] OR 2ND FLR;  Service: Colon and Rectal;;    ROBOT-ASSISTED LAPAROSCOPIC RESECTION OF SIGMOID COLON USING DA OLE XI N/A 2/15/2019    Procedure: XI ROBOTIC COLECTOMY, SIGMOID;  Surgeon: Zachery Augustin MD;  Location: [facility] OR 2ND FLR;  Service: Colon and Rectal;  Laterality: N/A;    STAPEDECTOMY  2009       Pre-op Assessment    I have reviewed the Patient Summary Reports.     I have reviewed the Nursing Notes. I have reviewed the NPO Status.   I have reviewed the Medications.     Review of Systems  Anesthesia Hx:  No problems with previous Anesthesia  Denies Family Hx of Anesthesia complications.   Denies Personal Hx of  Anesthesia complications.   Hematology/Oncology:  Hematology Normal   Oncology Normal     EENT/Dental:EENT/Dental Normal   Cardiovascular:  Cardiovascular Normal     Pulmonary:  Pulmonary Normal    Hepatic/GI:   Bowel Prep. GERD    Musculoskeletal:  Musculoskeletal Normal    Neurological:  Neurology Normal    Endocrine:  Endocrine Normal    Dermatological:  Skin Normal    Psych:  Psychiatric Normal           Physical Exam  General: Well nourished    Airway:  Mallampati: II   Mouth Opening: Normal  TM Distance: Normal  Tongue: Normal  Neck ROM: Normal ROM    Dental:  Intact        Anesthesia Plan  Type of Anesthesia, risks & benefits discussed:    Anesthesia Type: Gen Natural Airway  Intra-op Monitoring Plan: Standard ASA Monitors  Informed Consent: Informed consent signed with the Patient and all parties understand the risks and agree with anesthesia plan.  All questions answered.   ASA Score: 2  Day of Surgery Review of History & Physical: H&P Update referred to the surgeon/provider.I have interviewed and examined the patient. I have reviewed the patient's H&P dated: There are no significant changes.     Ready For Surgery From Anesthesia Perspective.     .

## 2023-10-19 NOTE — ANESTHESIA POSTPROCEDURE EVALUATION
Anesthesia Post Evaluation    Patient: Irene Galo    Procedure(s) Performed: Procedure(s) (LRB):  COLONOSCOPY (N/A)    Final Anesthesia Type: general      Patient location during evaluation: PACU  Patient participation: Yes- Able to Participate  Level of consciousness: awake and alert  Post-procedure vital signs: reviewed and stable  Pain management: adequate  Airway patency: patent    PONV status at discharge: No PONV  Anesthetic complications: no      Cardiovascular status: blood pressure returned to baseline  Respiratory status: unassisted  Hydration status: euvolemic  Follow-up not needed.          Vitals Value Taken Time   /67 10/19/23 0927   Temp 36.6 °C (97.9 °F) 10/19/23 0859   Pulse 55 10/19/23 0927   Resp 18 10/19/23 0927   SpO2 99 % 10/19/23 0927         No case tracking events are documented in the log.      Pain/Bon Score: Bon Score: 10 (10/19/2023  9:16 AM)

## 2023-11-27 DIAGNOSIS — G62.0 NEUROPATHY DUE TO CHEMOTHERAPEUTIC DRUG: ICD-10-CM

## 2023-11-27 DIAGNOSIS — T45.1X5A NEUROPATHY DUE TO CHEMOTHERAPEUTIC DRUG: ICD-10-CM

## 2023-11-27 RX ORDER — PREGABALIN 75 MG/1
75 CAPSULE ORAL 2 TIMES DAILY
Qty: 30 CAPSULE | Refills: 1 | Status: SHIPPED | OUTPATIENT
Start: 2023-11-27 | End: 2024-01-08 | Stop reason: SDUPTHER

## 2023-12-07 DIAGNOSIS — K21.9 ACID REFLUX: ICD-10-CM

## 2023-12-07 RX ORDER — OMEPRAZOLE 40 MG/1
40 CAPSULE, DELAYED RELEASE ORAL DAILY
Qty: 60 CAPSULE | Refills: 11 | Status: SHIPPED | OUTPATIENT
Start: 2023-12-07 | End: 2024-12-06

## 2024-01-08 DIAGNOSIS — G62.0 NEUROPATHY DUE TO CHEMOTHERAPEUTIC DRUG: ICD-10-CM

## 2024-01-08 DIAGNOSIS — T45.1X5A NEUROPATHY DUE TO CHEMOTHERAPEUTIC DRUG: ICD-10-CM

## 2024-01-09 RX ORDER — PREGABALIN 75 MG/1
75 CAPSULE ORAL 2 TIMES DAILY
Qty: 30 CAPSULE | Refills: 1 | Status: SHIPPED | OUTPATIENT
Start: 2024-01-09 | End: 2024-02-08 | Stop reason: SDUPTHER

## 2024-02-08 DIAGNOSIS — T45.1X5A NEUROPATHY DUE TO CHEMOTHERAPEUTIC DRUG: ICD-10-CM

## 2024-02-08 DIAGNOSIS — G62.0 NEUROPATHY DUE TO CHEMOTHERAPEUTIC DRUG: ICD-10-CM

## 2024-02-08 RX ORDER — PREGABALIN 75 MG/1
75 CAPSULE ORAL 2 TIMES DAILY
Qty: 60 CAPSULE | Refills: 0 | Status: SHIPPED | OUTPATIENT
Start: 2024-02-08 | End: 2024-03-06 | Stop reason: SDUPTHER

## 2024-02-27 DIAGNOSIS — T45.1X5A NEUROPATHY DUE TO CHEMOTHERAPEUTIC DRUG: ICD-10-CM

## 2024-02-27 DIAGNOSIS — G62.0 NEUROPATHY DUE TO CHEMOTHERAPEUTIC DRUG: ICD-10-CM

## 2024-02-28 RX ORDER — PREGABALIN 75 MG/1
75 CAPSULE ORAL 2 TIMES DAILY
Qty: 60 CAPSULE | Refills: 0 | OUTPATIENT
Start: 2024-02-28 | End: 2024-03-29

## 2024-03-06 DIAGNOSIS — G62.0 NEUROPATHY DUE TO CHEMOTHERAPEUTIC DRUG: ICD-10-CM

## 2024-03-06 DIAGNOSIS — T45.1X5A NEUROPATHY DUE TO CHEMOTHERAPEUTIC DRUG: ICD-10-CM

## 2024-03-08 RX ORDER — PREGABALIN 75 MG/1
75 CAPSULE ORAL 2 TIMES DAILY
Qty: 60 CAPSULE | Refills: 0 | Status: SHIPPED | OUTPATIENT
Start: 2024-03-08 | End: 2024-04-05 | Stop reason: SDUPTHER

## 2024-04-05 DIAGNOSIS — G62.0 NEUROPATHY DUE TO CHEMOTHERAPEUTIC DRUG: ICD-10-CM

## 2024-04-05 DIAGNOSIS — T45.1X5A NEUROPATHY DUE TO CHEMOTHERAPEUTIC DRUG: ICD-10-CM

## 2024-04-05 NOTE — TELEPHONE ENCOUNTER
Please see the attached refill request.   Duration Of Freeze Thaw-Cycle (Seconds): 0 Detail Level: Detailed Consent: The patient's consent was obtained including but not limited to risks of crusting, scabbing, blistering, scarring, darker or lighter pigmentary change, recurrence, incomplete removal and infection. Post-Care Instructions: I reviewed with the patient in detail post-care instructions. Patient is to wear sunprotection, and avoid picking at any of the treated lesions. Pt may apply Vaseline to crusted or scabbing areas. Render Post-Care Instructions In Note?: no

## 2024-04-09 RX ORDER — PREGABALIN 75 MG/1
75 CAPSULE ORAL 2 TIMES DAILY
Qty: 60 CAPSULE | Refills: 0 | Status: SHIPPED | OUTPATIENT
Start: 2024-04-09 | End: 2024-05-10 | Stop reason: SDUPTHER

## 2024-05-07 DIAGNOSIS — G62.0 NEUROPATHY DUE TO CHEMOTHERAPEUTIC DRUG: ICD-10-CM

## 2024-05-07 DIAGNOSIS — T45.1X5A NEUROPATHY DUE TO CHEMOTHERAPEUTIC DRUG: ICD-10-CM

## 2024-05-08 RX ORDER — PREGABALIN 75 MG/1
75 CAPSULE ORAL 2 TIMES DAILY
Qty: 60 CAPSULE | Refills: 0 | OUTPATIENT
Start: 2024-05-08 | End: 2024-06-07

## 2024-05-10 DIAGNOSIS — T45.1X5A NEUROPATHY DUE TO CHEMOTHERAPEUTIC DRUG: ICD-10-CM

## 2024-05-10 DIAGNOSIS — G62.0 NEUROPATHY DUE TO CHEMOTHERAPEUTIC DRUG: ICD-10-CM

## 2024-05-10 RX ORDER — PREGABALIN 75 MG/1
75 CAPSULE ORAL 2 TIMES DAILY
Qty: 60 CAPSULE | Refills: 0 | Status: SHIPPED | OUTPATIENT
Start: 2024-05-10 | End: 2024-06-10 | Stop reason: SDUPTHER

## 2024-05-10 NOTE — TELEPHONE ENCOUNTER
Please see the attached refill request. Dr. Lamb typically fills this medication. Can you fill in his absence?

## 2024-05-15 ENCOUNTER — PATIENT OUTREACH (OUTPATIENT)
Dept: ADMINISTRATIVE | Facility: HOSPITAL | Age: 59
End: 2024-05-15
Payer: COMMERCIAL

## 2024-05-17 ENCOUNTER — OFFICE VISIT (OUTPATIENT)
Dept: OBSTETRICS AND GYNECOLOGY | Facility: CLINIC | Age: 59
End: 2024-05-17
Payer: COMMERCIAL

## 2024-05-17 VITALS
DIASTOLIC BLOOD PRESSURE: 76 MMHG | HEIGHT: 62 IN | OXYGEN SATURATION: 98 % | SYSTOLIC BLOOD PRESSURE: 122 MMHG | WEIGHT: 168.5 LBS | HEART RATE: 67 BPM | BODY MASS INDEX: 31.01 KG/M2

## 2024-05-17 DIAGNOSIS — Z11.51 SCREENING FOR HUMAN PAPILLOMAVIRUS (HPV): ICD-10-CM

## 2024-05-17 DIAGNOSIS — Z85.038 HISTORY OF COLON CANCER: ICD-10-CM

## 2024-05-17 DIAGNOSIS — Z12.4 SCREENING FOR MALIGNANT NEOPLASM OF CERVIX: ICD-10-CM

## 2024-05-17 DIAGNOSIS — Z01.419 WELL WOMAN EXAM WITH ROUTINE GYNECOLOGICAL EXAM: Primary | ICD-10-CM

## 2024-05-17 DIAGNOSIS — Z12.31 BREAST CANCER SCREENING BY MAMMOGRAM: ICD-10-CM

## 2024-05-17 PROCEDURE — 3008F BODY MASS INDEX DOCD: CPT | Mod: CPTII,S$GLB,, | Performed by: OBSTETRICS & GYNECOLOGY

## 2024-05-17 PROCEDURE — 3074F SYST BP LT 130 MM HG: CPT | Mod: CPTII,S$GLB,, | Performed by: OBSTETRICS & GYNECOLOGY

## 2024-05-17 PROCEDURE — 88175 CYTOPATH C/V AUTO FLUID REDO: CPT | Performed by: OBSTETRICS & GYNECOLOGY

## 2024-05-17 PROCEDURE — 99999 PR PBB SHADOW E&M-EST. PATIENT-LVL III: CPT | Mod: PBBFAC,,, | Performed by: OBSTETRICS & GYNECOLOGY

## 2024-05-17 PROCEDURE — 1159F MED LIST DOCD IN RCRD: CPT | Mod: CPTII,S$GLB,, | Performed by: OBSTETRICS & GYNECOLOGY

## 2024-05-17 PROCEDURE — 3078F DIAST BP <80 MM HG: CPT | Mod: CPTII,S$GLB,, | Performed by: OBSTETRICS & GYNECOLOGY

## 2024-05-17 PROCEDURE — 1160F RVW MEDS BY RX/DR IN RCRD: CPT | Mod: CPTII,S$GLB,, | Performed by: OBSTETRICS & GYNECOLOGY

## 2024-05-17 PROCEDURE — 99396 PREV VISIT EST AGE 40-64: CPT | Mod: S$GLB,,, | Performed by: OBSTETRICS & GYNECOLOGY

## 2024-05-17 PROCEDURE — 87624 HPV HI-RISK TYP POOLED RSLT: CPT | Performed by: OBSTETRICS & GYNECOLOGY

## 2024-05-17 NOTE — PROGRESS NOTES
Colonoscopy preadmit appt scheduled on 10/10/24 at 9:00 am and colonoscopy scheduled on 10/24/2024.

## 2024-05-17 NOTE — PROGRESS NOTES
Subjective:    Patient ID: Irene Galo is a 58 y.o. y.o. female.     Chief Complaint: Annual Well Woman Exam     History of Present Illness:  Irene presents today for Annual Well Woman exam. She describes her menses as  absent s/p chemo .She denies pelvic pain.  She denies breast tenderness, masses, nipple discharge. She denies difficulty with urination or bowel movements. She denies menopausal symptoms such as hotflashes, vaginal dryness, and night sweats. She denies bloating, early satiety, or weight changes. She is sexually active. Contraception is by  vasectomy.    Patient had colon resection with chemotherapy preformed in 2019. She reports since chemo she has had no cycles. No vasomotor symptoms. She has colonoscopies done every year.     The following portions of the patient's history were reviewed and updated as appropriate: allergies, current medications, past family history, past medical history, past social history, past surgical history and problem list.      Menstrual History:   Patient's last menstrual period was 2019..     OB History          4    Para   4    Term   4            AB        Living   5         SAB        IAB        Ectopic        Multiple   1    Live Births                     The following portions of the patient's history were reviewed and updated as appropriate: allergies, current medications, past family history, past medical history, past social history, past surgical history and problem list.        ROS:     Review of Systems   Constitutional:  Negative for activity change, appetite change, chills, diaphoresis, fatigue, fever and unexpected weight change.   HENT:  Negative for mouth sores and tinnitus.    Eyes:  Negative for discharge and visual disturbance.   Respiratory:  Negative for cough, shortness of breath and wheezing.    Cardiovascular:  Negative for chest pain, palpitations and leg swelling.   Gastrointestinal:  Negative for abdominal pain,  "bloating, blood in stool, constipation, diarrhea, nausea and vomiting.   Endocrine: Negative for diabetes, hair loss, hot flashes, hyperthyroidism and hypothyroidism.   Genitourinary:  Negative for dysuria, flank pain, frequency, genital sores, hematuria, urgency, vaginal bleeding, vaginal discharge, vaginal pain, postcoital bleeding and vaginal odor.   Musculoskeletal:  Negative for back pain, joint swelling and myalgias.   Integumentary:  Negative for rash, acne, breast mass, nipple discharge and breast skin changes.   Neurological:  Negative for seizures, syncope, numbness and headaches.   Hematological:  Negative for adenopathy. Does not bruise/bleed easily.   Psychiatric/Behavioral:  Negative for depression and sleep disturbance. The patient is not nervous/anxious.    Breast: Negative for mass, mastodynia, nipple discharge and skin changes      Objective:    Vital Signs:  Vitals:    05/17/24 1218   BP: 122/76   BP Location: Left arm   Patient Position: Sitting   BP Method: Medium (Manual)   Pulse: 67   SpO2: 98%   Weight: 76.4 kg (168 lb 8.3 oz)   Height: 5' 2" (1.575 m)         Physical Exam:  General:  alert, cooperative, appears stated age   Skin:  Skin color, texture, turgor normal. No rashes or lesions   HEENT:  conjunctivae/corneas clear. PERRL.   Neck: supple, trachea midline, no adenopathy or thyromegally   Respiratory:  clear to auscultation bilaterally   Heart:  regular rate and rhythm, S1, S2 normal, no murmur, click, rub or gallop   Breasts:  no discharge, erythema, or tenderness   Abdomen:  normal findings: bowel sounds normal, no masses palpable, no organomegaly and soft, non-tender   Pelvis: External genitalia: normal general appearance  Urinary system: urethral meatus normal, bladder nontender  Vaginal: normal mucosa without prolapse or lesions  Cervix: normal appearance  Uterus: normal size, shape, position  Adnexa: normal size, nontender bilaterally   Extremities: Normal ROM; no edema, no " cyanosis   Neurologial: Normal strength and tone. No focal numbness or weakness. Reflexes 2+ and equal.   Psychiatric: normal mood, speech, dress, and thought processes         Assessment:       Healthy female exam.     1. Well woman exam with routine gynecological exam    2. Screening for malignant neoplasm of cervix    3. Screening for human papillomavirus (HPV)    4. Breast cancer screening by mammogram          Plan:      Pap smear   HPV cotesting  MMG ordered  Colonoscopy ordered  Discussed weight bearing exercise, calcium, and vitamin d for osteoporosis prevention  RTC in 1 year    COUNSELING:  Irene was counseled on STD pevention, use and side-effects of various contraceptive measures, A.C.O.G. Pap guidelines and recommendations for yearly pelvic exams in addition to recommendations for monthly self breast exams; to see her PCP for other health maintenance.

## 2024-05-23 LAB
HPV HR 12 DNA SPEC QL NAA+PROBE: NEGATIVE
HPV16 AG SPEC QL: NEGATIVE
HPV18 DNA SPEC QL NAA+PROBE: NEGATIVE

## 2024-05-24 LAB
FINAL PATHOLOGIC DIAGNOSIS: NORMAL
Lab: NORMAL

## 2024-06-10 DIAGNOSIS — G62.0 NEUROPATHY DUE TO CHEMOTHERAPEUTIC DRUG: ICD-10-CM

## 2024-06-10 DIAGNOSIS — T45.1X5A NEUROPATHY DUE TO CHEMOTHERAPEUTIC DRUG: ICD-10-CM

## 2024-06-10 RX ORDER — PREGABALIN 75 MG/1
75 CAPSULE ORAL 2 TIMES DAILY
Qty: 60 CAPSULE | Refills: 0 | Status: SHIPPED | OUTPATIENT
Start: 2024-06-10 | End: 2024-07-11

## 2024-06-12 ENCOUNTER — HOSPITAL ENCOUNTER (OUTPATIENT)
Dept: RADIOLOGY | Facility: HOSPITAL | Age: 59
Discharge: HOME | End: 2024-06-12
Attending: OBSTETRICS & GYNECOLOGY
Payer: COMMERCIAL

## 2024-06-12 VITALS — BODY MASS INDEX: 30.91 KG/M2 | HEIGHT: 62 IN | WEIGHT: 168 LBS

## 2024-06-12 DIAGNOSIS — Z12.31 BREAST CANCER SCREENING BY MAMMOGRAM: ICD-10-CM

## 2024-06-12 PROCEDURE — 77067 SCR MAMMO BI INCL CAD: CPT | Mod: 26,,, | Performed by: RADIOLOGY

## 2024-06-12 PROCEDURE — 77067 SCR MAMMO BI INCL CAD: CPT | Mod: TC

## 2024-06-12 PROCEDURE — 77063 BREAST TOMOSYNTHESIS BI: CPT | Mod: TC

## 2024-06-12 PROCEDURE — 77063 BREAST TOMOSYNTHESIS BI: CPT | Mod: 26,,, | Performed by: RADIOLOGY

## 2024-06-28 ENCOUNTER — PATIENT MESSAGE (OUTPATIENT)
Dept: SURGERY | Facility: CLINIC | Age: 59
End: 2024-06-28
Payer: COMMERCIAL

## 2024-07-08 DIAGNOSIS — T45.1X5A NEUROPATHY DUE TO CHEMOTHERAPEUTIC DRUG: ICD-10-CM

## 2024-07-08 DIAGNOSIS — G62.0 NEUROPATHY DUE TO CHEMOTHERAPEUTIC DRUG: ICD-10-CM

## 2024-07-09 RX ORDER — PREGABALIN 75 MG/1
75 CAPSULE ORAL 2 TIMES DAILY
Qty: 60 CAPSULE | Refills: 0 | Status: SHIPPED | OUTPATIENT
Start: 2024-07-09 | End: 2024-08-08

## 2024-08-05 ENCOUNTER — TELEPHONE (OUTPATIENT)
Dept: ENDOSCOPY | Facility: HOSPITAL | Age: 59
End: 2024-08-05
Payer: COMMERCIAL

## 2024-08-05 DIAGNOSIS — Z12.11 SPECIAL SCREENING FOR MALIGNANT NEOPLASMS, COLON: Primary | ICD-10-CM

## 2024-08-05 DIAGNOSIS — G62.0 NEUROPATHY DUE TO CHEMOTHERAPEUTIC DRUG: ICD-10-CM

## 2024-08-05 DIAGNOSIS — T45.1X5A NEUROPATHY DUE TO CHEMOTHERAPEUTIC DRUG: ICD-10-CM

## 2024-08-05 RX ORDER — PREGABALIN 75 MG/1
75 CAPSULE ORAL 2 TIMES DAILY
Qty: 60 CAPSULE | Refills: 0 | OUTPATIENT
Start: 2024-08-05 | End: 2024-09-04

## 2024-08-05 RX ORDER — SODIUM, POTASSIUM,MAG SULFATES 17.5-3.13G
1 SOLUTION, RECONSTITUTED, ORAL ORAL DAILY
Qty: 354 ML | Refills: 0 | Status: SHIPPED | OUTPATIENT
Start: 2024-08-05

## 2024-08-06 DIAGNOSIS — T45.1X5A NEUROPATHY DUE TO CHEMOTHERAPEUTIC DRUG: ICD-10-CM

## 2024-08-06 DIAGNOSIS — G62.0 NEUROPATHY DUE TO CHEMOTHERAPEUTIC DRUG: ICD-10-CM

## 2024-08-06 RX ORDER — PREGABALIN 75 MG/1
75 CAPSULE ORAL 2 TIMES DAILY
Qty: 60 CAPSULE | Refills: 0 | OUTPATIENT
Start: 2024-08-06 | End: 2024-09-05

## 2024-08-12 DIAGNOSIS — G62.0 NEUROPATHY DUE TO CHEMOTHERAPEUTIC DRUG: ICD-10-CM

## 2024-08-12 DIAGNOSIS — T45.1X5A NEUROPATHY DUE TO CHEMOTHERAPEUTIC DRUG: ICD-10-CM

## 2024-08-13 RX ORDER — PREGABALIN 75 MG/1
75 CAPSULE ORAL 2 TIMES DAILY
Qty: 60 CAPSULE | Refills: 0 | OUTPATIENT
Start: 2024-08-13 | End: 2024-09-12

## 2024-08-14 ENCOUNTER — PATIENT MESSAGE (OUTPATIENT)
Dept: INTERNAL MEDICINE | Facility: CLINIC | Age: 59
End: 2024-08-14
Payer: COMMERCIAL

## 2024-08-14 DIAGNOSIS — G62.0 NEUROPATHY DUE TO CHEMOTHERAPEUTIC DRUG: ICD-10-CM

## 2024-08-14 DIAGNOSIS — T45.1X5A NEUROPATHY DUE TO CHEMOTHERAPEUTIC DRUG: ICD-10-CM

## 2024-08-14 RX ORDER — PREGABALIN 75 MG/1
75 CAPSULE ORAL 2 TIMES DAILY
Qty: 60 CAPSULE | Refills: 0 | Status: SHIPPED | OUTPATIENT
Start: 2024-08-14 | End: 2024-09-13

## 2024-08-14 NOTE — TELEPHONE ENCOUNTER
Patient requesting refill of pregabalin (LYRICA) 75 MG capsule to be sent to [facility]       Requested Prescriptions     Pending Prescriptions Disp Refills    pregabalin (LYRICA) 75 MG capsule 60 capsule 0     Sig: Take 1 capsule (75 mg total) by mouth 2 (two) times daily.

## 2024-09-10 DIAGNOSIS — T45.1X5A NEUROPATHY DUE TO CHEMOTHERAPEUTIC DRUG: ICD-10-CM

## 2024-09-10 DIAGNOSIS — G62.0 NEUROPATHY DUE TO CHEMOTHERAPEUTIC DRUG: ICD-10-CM

## 2024-09-10 RX ORDER — PREGABALIN 75 MG/1
75 CAPSULE ORAL 2 TIMES DAILY
Qty: 60 CAPSULE | Refills: 0 | Status: SHIPPED | OUTPATIENT
Start: 2024-09-10 | End: 2024-10-10

## 2024-09-10 NOTE — TELEPHONE ENCOUNTER
Please see the attached refill request. This is a medication that you fill for Kayleigh since she is not able to fill this.

## 2024-10-01 ENCOUNTER — OFFICE VISIT (OUTPATIENT)
Dept: INTERNAL MEDICINE | Facility: CLINIC | Age: 59
End: 2024-10-01
Payer: COMMERCIAL

## 2024-10-01 VITALS
RESPIRATION RATE: 18 BRPM | OXYGEN SATURATION: 98 % | HEIGHT: 62 IN | HEART RATE: 64 BPM | WEIGHT: 174.81 LBS | BODY MASS INDEX: 32.17 KG/M2 | DIASTOLIC BLOOD PRESSURE: 70 MMHG | SYSTOLIC BLOOD PRESSURE: 112 MMHG

## 2024-10-01 DIAGNOSIS — G62.0 NEUROPATHY DUE TO CHEMOTHERAPEUTIC DRUG: Primary | ICD-10-CM

## 2024-10-01 DIAGNOSIS — T45.1X5A NEUROPATHY DUE TO CHEMOTHERAPEUTIC DRUG: Primary | ICD-10-CM

## 2024-10-01 PROCEDURE — 3008F BODY MASS INDEX DOCD: CPT | Mod: CPTII,S$GLB,, | Performed by: NURSE PRACTITIONER

## 2024-10-01 PROCEDURE — 99214 OFFICE O/P EST MOD 30 MIN: CPT | Mod: S$GLB,,, | Performed by: NURSE PRACTITIONER

## 2024-10-01 PROCEDURE — 3078F DIAST BP <80 MM HG: CPT | Mod: CPTII,S$GLB,, | Performed by: NURSE PRACTITIONER

## 2024-10-01 PROCEDURE — 3074F SYST BP LT 130 MM HG: CPT | Mod: CPTII,S$GLB,, | Performed by: NURSE PRACTITIONER

## 2024-10-01 PROCEDURE — 1159F MED LIST DOCD IN RCRD: CPT | Mod: CPTII,S$GLB,, | Performed by: NURSE PRACTITIONER

## 2024-10-01 PROCEDURE — 99999 PR PBB SHADOW E&M-EST. PATIENT-LVL III: CPT | Mod: PBBFAC,,, | Performed by: NURSE PRACTITIONER

## 2024-10-01 NOTE — PROGRESS NOTES
Subjective:       Patient ID: Irene Galo is a 58 y.o. female.    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   HENT:  Negative for hearing loss, rhinorrhea and trouble swallowing.    Eyes:  Negative for discharge and visual disturbance.   Respiratory:  Negative for chest tightness and wheezing.    Cardiovascular:  Negative for chest pain and palpitations.   Gastrointestinal:  Negative for blood in stool, constipation, diarrhea and vomiting.   Endocrine: Negative for polydipsia and polyuria.   Genitourinary:  Negative for difficulty urinating, dysuria, hematuria and menstrual problem.   Musculoskeletal:  Negative for arthralgias, joint swelling and neck pain.   Neurological:  Negative for weakness and headaches.   Psychiatric/Behavioral:  Negative for confusion and dysphoric mood.        Objective:      Physical Exam  Vitals and nursing note reviewed.   Constitutional:       Appearance: Normal appearance. She is well-developed.   HENT:      Head: Normocephalic and atraumatic.      Right Ear: External ear normal.      Left Ear: External ear normal.      Nose: Nose normal.   Eyes:      Conjunctiva/sclera: Conjunctivae normal.      Pupils: Pupils are equal, round, and reactive to light.   Cardiovascular:      Rate and Rhythm: Normal rate and regular rhythm.      Heart sounds: Normal heart sounds. No murmur heard.  Pulmonary:      Effort: Pulmonary effort is normal. No respiratory distress.      Breath sounds: Normal breath sounds. No wheezing.   Abdominal:      General: Bowel sounds are normal.      Palpations: Abdomen is soft. There is no mass.   Musculoskeletal:         General: No swelling. Normal range  of motion.      Cervical back: Normal range of motion and neck supple.   Skin:     General: Skin is warm and dry.      Capillary Refill: Capillary refill takes less than 2 seconds.   Neurological:      General: No focal deficit present.      Mental Status: She is alert and oriented to person, place, and time.   Psychiatric:         Behavior: Behavior normal.         Thought Content: Thought content normal.         Judgment: Judgment normal.         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 BMI 31.0-31.9,adult        Plan:     Problem List Items Addressed This Visit       Neuropathy due to chemotherapeutic drug - Primary     Other Visit Diagnoses       BMI 31.0-31.9,adult            Will start to watch diet and start walking again    Scheduled for labs with school board in November will bring in copies.   Pap with Dr Olson 5/2024   Mammo with Dr Olson 6/2024   Colon ca screen colonoscopy scheduled 10/17/24 (1yr repeat)     Declines flu shot   Shngrix discussed

## 2024-10-10 ENCOUNTER — TELEPHONE (OUTPATIENT)
Dept: ENDOSCOPY | Facility: HOSPITAL | Age: 59
End: 2024-10-10
Payer: COMMERCIAL

## 2024-10-10 NOTE — TELEPHONE ENCOUNTER
Contacted Pt for Endoscopy precall to confirm scheduled procedure(s) Colonoscopy on 10/17/24. Pt did not answer. Left voicemail for pt to call Endoscopy Scheduling to confirm.

## 2024-10-10 NOTE — TELEPHONE ENCOUNTER
Spoke to pt for pre-call to confirm scheduled Colonoscopy and patient verbalized understanding of the following:       Date of Procedure (s)  verified 10/17/2024  Arrival Time 7:40 AM verified.  Location of Procedure(s) [facility] [address] Floor verified.  NPO status reinforced. Ok to continue clear liquids up until 4 hours prior to the Endoscopy procedure.   Pt confirmed receipt of prep instructions and Rx prep (if applicable).  Instructions provided to patient via [facility]  instructions sent today by Nati Encinas RN. Patient was notified.  Pt confirmed ride home after procedure if procedure requires anesthesia.   Pre-call screening questionnaire reviewed and completed with patient.   Appointment details are tentative, including check-in time.  If the patient begins taking any blood thinning medications, injectable weight loss/diabetes medications (other than insulin), or Adipex (phentermine) patient was instructed to contact the endoscopy scheduling department as soon as possible.  Patient was advised to call the endoscopy scheduling department if any questions or concerns arise.        Endoscopy Scheduling Department

## 2024-10-11 DIAGNOSIS — T45.1X5A CHEMOTHERAPY-INDUCED NEUROPATHY: ICD-10-CM

## 2024-10-11 DIAGNOSIS — T45.1X5A NEUROPATHY DUE TO CHEMOTHERAPEUTIC DRUG: ICD-10-CM

## 2024-10-11 DIAGNOSIS — G62.0 NEUROPATHY DUE TO CHEMOTHERAPEUTIC DRUG: ICD-10-CM

## 2024-10-11 DIAGNOSIS — G62.0 CHEMOTHERAPY-INDUCED NEUROPATHY: ICD-10-CM

## 2024-10-14 ENCOUNTER — TELEPHONE (OUTPATIENT)
Dept: INTERNAL MEDICINE | Facility: CLINIC | Age: 59
End: 2024-10-14
Payer: COMMERCIAL

## 2024-10-14 RX ORDER — PREGABALIN 75 MG/1
75 CAPSULE ORAL 2 TIMES DAILY
Qty: 60 CAPSULE | Refills: 0 | Status: SHIPPED | OUTPATIENT
Start: 2024-10-14 | End: 2024-11-13

## 2024-10-14 NOTE — TELEPHONE ENCOUNTER
----- Message from Tremaine sent at 10/14/2024  9:46 AM CDT -----  Contact: pt  Irene Galo  MRN: [id number]  : 1965  PCP: Kayleigh Kauffman  Home Phone      [phone]  Work Phone      Not on file.  Mobile          [phone]      MESSAGE:     Pt need refill on pregabalin (LYRICA) 75 MG capsule          Preferred Pharmacy    [facility] [address] [place] [address]  Phone: [phone] Fax: [phone]  Hours: Mon-Fri, 8a-5:30p          [phone]

## 2024-10-16 ENCOUNTER — ANESTHESIA EVENT (OUTPATIENT)
Dept: ENDOSCOPY | Facility: HOSPITAL | Age: 59
End: 2024-10-16
Payer: COMMERCIAL

## 2024-10-17 ENCOUNTER — ANESTHESIA (OUTPATIENT)
Dept: ENDOSCOPY | Facility: HOSPITAL | Age: 59
End: 2024-10-17
Payer: COMMERCIAL

## 2024-10-17 ENCOUNTER — HOSPITAL ENCOUNTER (OUTPATIENT)
Facility: HOSPITAL | Age: 59
Discharge: HOME | End: 2024-10-17
Attending: COLON & RECTAL SURGERY | Admitting: COLON & RECTAL SURGERY
Payer: COMMERCIAL

## 2024-10-17 VITALS
HEART RATE: 55 BPM | BODY MASS INDEX: 32.1 KG/M2 | WEIGHT: 170 LBS | OXYGEN SATURATION: 100 % | HEIGHT: 61 IN | DIASTOLIC BLOOD PRESSURE: 62 MMHG | TEMPERATURE: 97 F | SYSTOLIC BLOOD PRESSURE: 120 MMHG | RESPIRATION RATE: 17 BRPM

## 2024-10-17 DIAGNOSIS — C18.9 COLON CANCER: ICD-10-CM

## 2024-10-17 PROCEDURE — 27201012 HC FORCEPS, HOT/COLD, DISP: Performed by: COLON & RECTAL SURGERY

## 2024-10-17 PROCEDURE — 63600175 PHARM REV CODE 636 W HCPCS: Performed by: NURSE ANESTHETIST, CERTIFIED REGISTERED

## 2024-10-17 PROCEDURE — 88305 TISSUE EXAM BY PATHOLOGIST: CPT | Mod: 26,,, | Performed by: PATHOLOGY

## 2024-10-17 PROCEDURE — 88305 TISSUE EXAM BY PATHOLOGIST: CPT | Performed by: PATHOLOGY

## 2024-10-17 PROCEDURE — 45380 COLONOSCOPY AND BIOPSY: CPT | Mod: 33,,, | Performed by: COLON & RECTAL SURGERY

## 2024-10-17 PROCEDURE — 37000008 HC ANESTHESIA 1ST 15 MINUTES: Performed by: COLON & RECTAL SURGERY

## 2024-10-17 PROCEDURE — 45380 COLONOSCOPY AND BIOPSY: CPT | Mod: 33 | Performed by: COLON & RECTAL SURGERY

## 2024-10-17 PROCEDURE — 37000009 HC ANESTHESIA EA ADD 15 MINS: Performed by: COLON & RECTAL SURGERY

## 2024-10-17 RX ORDER — PROPOFOL 10 MG/ML
VIAL (ML) INTRAVENOUS CONTINUOUS PRN
Status: DISCONTINUED | OUTPATIENT
Start: 2024-10-17 | End: 2024-10-17

## 2024-10-17 RX ORDER — LIDOCAINE HYDROCHLORIDE 20 MG/ML
INJECTION, SOLUTION EPIDURAL; INFILTRATION; INTRACAUDAL; PERINEURAL
Status: DISCONTINUED | OUTPATIENT
Start: 2024-10-17 | End: 2024-10-17

## 2024-10-17 RX ORDER — SODIUM CHLORIDE 9 MG/ML
INJECTION, SOLUTION INTRAVENOUS CONTINUOUS
Status: DISCONTINUED | OUTPATIENT
Start: 2024-10-17 | End: 2024-10-17 | Stop reason: HOSPADM

## 2024-10-17 RX ADMIN — PROPOFOL 80 MG: 10 INJECTION, EMULSION INTRAVENOUS at 08:10

## 2024-10-17 RX ADMIN — GLYCOPYRROLATE 0.2 MG: 0.2 INJECTION, SOLUTION INTRAMUSCULAR; INTRAVENOUS at 08:10

## 2024-10-17 RX ADMIN — PROPOFOL 175 MCG/KG/MIN: 10 INJECTION, EMULSION INTRAVENOUS at 08:10

## 2024-10-17 RX ADMIN — LIDOCAINE HYDROCHLORIDE 80 MG: 20 INJECTION, SOLUTION EPIDURAL; INFILTRATION; INTRACAUDAL; PERINEURAL at 08:10

## 2024-10-17 NOTE — ANESTHESIA POSTPROCEDURE EVALUATION
Anesthesia Post Evaluation    Patient: Irene Galo    Procedure(s) Performed: Procedure(s) (LRB):  COLONOSCOPY (N/A)    Final Anesthesia Type: general      Patient location during evaluation: PACU  Patient participation: Yes- Able to Participate  Level of consciousness: awake and alert  Post-procedure vital signs: reviewed and stable  Pain management: adequate  Airway patency: patent    PONV status at discharge: No PONV  Anesthetic complications: no      Cardiovascular status: hemodynamically stable  Respiratory status: spontaneous ventilation  Follow-up not needed.              Vitals Value Taken Time   /62 10/17/24 0946   Temp 36.2 °C (97.2 °F) 10/17/24 0921   Pulse 55 10/17/24 0946   Resp 17 10/17/24 0946   SpO2 100 % 10/17/24 0946         Event Time   Out of Recovery 10:04:30         Pain/Bon Score: Bon Score: 10 (10/17/2024  9:22 AM)

## 2024-10-17 NOTE — H&P
COLONOSCOPY HISTORY AND PHYSICAL EXAM    Procedure : Colonoscopy      INDICATIONS: personal history of colon cancer, robotic sigmoidectomy 2019 and adjuvant chemo    Family Hx of CRC: none    Last Colonoscopy:  10/19/23  Findings:   Many small and large-mouthed diverticula were found in the descending colon, transverse colon and ascending colon. There was evidence of a prior end-to-end colo-rectal anastomosis in the rectum. This was patent and was characterized by inpacted fecal matter in diverticulum just distal to anastomosis - unable to be removed. The anastomosis was traversed.      Past Medical History:   Diagnosis Date    Allergy     Bilateral hearing loss     Colon cancer     GERD (gastroesophageal reflux disease)     Uterine fibroid      Sedation Problems: NO  Family History   Problem Relation Name Age of Onset    Diverticulitis Father      Lung cancer Father      Breast cancer Paternal Grandmother  70    Stroke Mother      Heart disease Brother  45        Tobacco smoker     Diabetes Brother      Diabetes Brother       Fam Hx of Sedation Problems: NO  Social History     Socioeconomic History    Marital status:    Tobacco Use    Smoking status: Never    Smokeless tobacco: Never   Substance and Sexual Activity    Alcohol use: Yes     Comment: 1 glass of wine , 2-3 times a week    Drug use: No    Sexual activity: Yes     Partners: Male     Birth control/protection: Other-see comments, Partner-Vasectomy     Comment: --     Social Drivers of Health     Financial Resource Strain: Low Risk  (9/24/2024)    Overall Financial Resource Strain (CARDIA)     Difficulty of Paying Living Expenses: Not hard at all   Food Insecurity: No Food Insecurity (9/24/2024)    Hunger Vital Sign     Worried About Running Out of Food in the Last Year: Never true     Ran Out of Food in the Last Year: Never true   Transportation Needs: No Transportation Needs (10/2/2023)    PRAPARE - Transportation     Lack of Transportation  "(Medical): No     Lack of Transportation (Non-Medical): No   Physical Activity: Insufficiently Active (9/24/2024)    Exercise Vital Sign     Days of Exercise per Week: 1 day     Minutes of Exercise per Session: 30 min   Stress: No Stress Concern Present (9/24/2024)    [employer] - Occupational Stress Questionnaire     Feeling of Stress : Not at all   Housing Stability: Low Risk  (10/2/2023)    Housing Stability Vital Sign     Unable to Pay for Housing in the Last Year: No     Number of Places Lived in the Last Year: 1     Unstable Housing in the Last Year: No       Review of Systems - Negative except   Respiratory ROS: no dyspnea  Cardiovascular ROS: no exertional chest pain  Gastrointestinal ROS: NO abdominal discomfort,  NO rectal bleeding  Musculoskeletal ROS: no muscular pain  Neurological ROS: no recent stroke    Physical Exam:  /67 (BP Location: Left arm, Patient Position: Sitting)   Pulse 61   Temp 97.9 °F (36.6 °C) (Temporal)   Resp 14   Ht 5' 1" (1.549 m)   Wt 77.1 kg (170 lb)   LMP 02/28/2019   SpO2 97%   BMI 32.12 kg/m²   General: no distress  Head: normocephalic  Mallampati Score   Neck: supple, symmetrical, trachea midline  Lungs:  clear to auscultation bilaterally and normal respiratory effort  Heart: regular rate and rhythm  Abdomen: soft, non-tender non-distented; bowel sounds normal; no masses,  no organomegaly  Extremities: no cyanosis or edema, or clubbing    ASA:  I    PLAN  COLONOSCOPY.    SedationPlan :MAC    The details of the procedure, the possible need for biopsy or polypectomy and the potential risks including bleeding, perforation, missed polyps were discussed in detail.     "

## 2024-10-17 NOTE — ANESTHESIA PREPROCEDURE EVALUATION
Past Medical History:   Diagnosis Date    Allergy     Bilateral hearing loss     Colon cancer     GERD (gastroesophageal reflux disease)     Uterine fibroid      Patient Active Problem List   Diagnosis    Fibroids    Heart burn    History of colon cancer    Neuropathy due to chemotherapeutic drug     Past Surgical History:   Procedure Laterality Date    COLONOSCOPY N/A 1/10/2019    Procedure: COLONOSCOPY WITH BIOPSY;  Surgeon: Zachery Augustin MD;  [facility];  Service: Endoscopy;  Laterality: N/A;    COLONOSCOPY N/A 10/31/2019    Procedure: COLONOSCOPY;  Surgeon: Zachery Augustin MD;  [facility];  Service: Colon and Rectal;  Laterality: N/A;    COLONOSCOPY N/A 10/26/2021    Procedure: COLONOSCOPY;  Surgeon: Zachery Augustin MD;  Location: [facility] ENDO (4TH FLR);  Service: Colon and Rectal;  Laterality: N/A;  / prep ins. emailed- ERW.Covid Positive. on 10/4    COLONOSCOPY N/A 10/19/2023    Procedure: COLONOSCOPY;  Surgeon: Zachery Augustin MD;  Location: [facility] ENDO (4TH FLR);  Service: Colon and Rectal;  Laterality: N/A;  PEG   Inst portal  LW  10/12-lvm for precall-MS  10/18-precall complete-KPvt    INSERTION OF TUNNELED CENTRAL VENOUS CATHETER (CVC) WITH SUBCUTANEOUS PORT N/A 3/28/2019    Procedure: [id number];  Surgeon: Vladimir Bernal Jr., MD;  Location: [facility] OR 2ND FLR;  Service: General;  Laterality: N/A;    lasix  2000    both eyes    MOBILIZATION OF SPLENIC FLEXURE  2/15/2019    Procedure: MOBILIZATION, SPLENIC FLEXURE;  Surgeon: Zachery Augustin MD;  Location: [facility] OR 2ND FLR;  Service: Colon and Rectal;;    ROBOT-ASSISTED LAPAROSCOPIC RESECTION OF SIGMOID COLON USING DA OLE XI N/A 2/15/2019    Procedure: XI ROBOTIC COLECTOMY, SIGMOID;  Surgeon: Zachery Augustin MD;  Location: [facility] OR 2ND FLR;  Service: Colon and Rectal;  Laterality: N/A;    STAPEDECTOMY  2009                                                                                                                  10/17/2024  Irene  Vinayak Galo is a 58 y.o., female.      Pre-op Assessment    I have reviewed the Patient Summary Reports.    I have reviewed the NPO Status.   I have reviewed the Medications.     Review of Systems  Anesthesia Hx:  No problems with previous Anesthesia                Hematology/Oncology:  Hematology Normal   Oncology Normal                                   EENT/Dental:  EENT/Dental Normal           Cardiovascular:  Cardiovascular Normal                                              Pulmonary:  Pulmonary Normal                       Renal/:  Renal/ Normal                 Hepatic/GI:  Hepatic/GI Normal                    Musculoskeletal:  Musculoskeletal Normal                Neurological:  Neurology Normal                                      Endocrine:  Endocrine Normal            Dermatological:  Skin Normal    Psych:  Psychiatric Normal                    Physical Exam  General: Well nourished, Cooperative, Alert and Oriented    Airway:  Mallampati: II   Mouth Opening: Normal  TM Distance: Normal  Tongue: Normal  Neck ROM: Normal ROM    Dental:  Intact    Chest/Lungs:  Normal Respiratory Rate        Anesthesia Plan  Type of Anesthesia, risks & benefits discussed:    Anesthesia Type: Gen Natural Airway  Intra-op Monitoring Plan: Standard ASA Monitors  Post Op Pain Control Plan: multimodal analgesia  Induction:  IV  Informed Consent: Informed consent signed with the Patient and all parties understand the risks and agree with anesthesia plan.  All questions answered.   ASA Score: 2  Day of Surgery Review of History & Physical: H&P Update referred to the surgeon/provider.    Ready For Surgery From Anesthesia Perspective.     .

## 2024-10-17 NOTE — TRANSFER OF CARE
"Anesthesia Transfer of Care Note    Patient: Irene Galo    Procedure(s) Performed: Procedure(s) (LRB):  COLONOSCOPY (N/A)    Patient location: PACU    Anesthesia Type: general    Transport from OR: Transported from OR on room air with adequate spontaneous ventilation    Post pain: adequate analgesia    Post assessment: no apparent anesthetic complications and tolerated procedure well    Post vital signs: stable    Level of consciousness: responds to stimulation    Nausea/Vomiting: no vomiting    Complications: none    Transfer of care protocol was followed    Last vitals: Visit Vitals  /67 (BP Location: Left arm, Patient Position: Sitting)   Pulse 61   Temp 36.6 °C (97.9 °F) (Temporal)   Resp 14   Ht 5' 1" (1.549 m)   Wt 77.1 kg (170 lb)   LMP 02/28/2019   SpO2 97%   BMI 32.12 kg/m²     "

## 2024-10-17 NOTE — PROVATION PATIENT INSTRUCTIONS
Discharge Summary/Instructions after an Endoscopic Procedure  Patient Name: Irene Galo  Patient MRN: [id number]  Patient YOB: 1965 Thursday, October 17, 2024  Zachery Augustin MD  Dear patient,  As a result of recent federal legislation (The Federal Cures Act), you may   receive lab or pathology results from your procedure in your [facility]   account before your physician is able to contact you. Your physician or   their representative will relay the results to you with their   recommendations at their soonest availability.  Thank you,  RESTRICTIONS:  During your procedure today, you received medications for sedation.  These   medications may affect your judgment, balance and coordination.  Therefore,   for 24 hours, you have the following restrictions:   - DO NOT drive a car, operate machinery, make legal/financial decisions,   sign important papers or drink alcohol.    ACTIVITY:  Today: no heavy lifting, straining or running due to procedural   sedation/anesthesia.  The following day: return to full activity including work.  DIET:  Eat and drink normally unless instructed otherwise.     TREATMENT FOR COMMON SIDE EFFECTS:  - Mild abdominal pain, nausea, belching, bloating or excessive gas:  rest,   eat lightly and use a heating pad.  - Sore Throat: treat with throat lozenges and/or gargle with warm salt   water.  - Because air was used during the procedure, expelling large amounts of air   from your rectum or belching is normal.  - If a bowel prep was taken, you may not have a bowel movement for 1-3 days.    This is normal.  SYMPTOMS TO WATCH FOR AND REPORT TO YOUR PHYSICIAN:  1. Abdominal pain or bloating, other than gas cramps.  2. Chest pain.  3. Back pain.  4. Signs of infection such as: chills or fever occurring within 24 hours   after the procedure.  5. Rectal bleeding, which would show as bright red, maroon, or black stools.   (A tablespoon of blood from the rectum is not serious, especially if    hemorrhoids are present.)  6. Vomiting.  7. Weakness or dizziness.  GO DIRECTLY TO THE NEAREST EMERGENCY ROOM IF YOU HAVE ANY OF THE FOLLOWING:      Difficulty breathing              Chills and/or fever over 101 F   Persistent vomiting and/or vomiting blood   Severe abdominal pain   Severe chest pain   Black, tarry stools   Bleeding- more than one tablespoon   Any other symptom or condition that you feel may need urgent attention  Your doctor recommends these additional instructions:  If any biopsies were taken, your doctors clinic will contact you in 1 to 2   weeks with any results.  - Discharge patient to home.   - High fiber diet.   - Continue present medications.   - Await pathology results.   - Repeat colonoscopy in 3 years for surveillance.  For questions, problems or results please call your physician - Zachery Augustin MD at Work:  [phone].  [facility], EMERGENCY ROOM PHONE NUMBER: [phone]  IF A COMPLICATION OR EMERGENCY SITUATION ARISES AND YOU ARE UNABLE TO REACH   YOUR PHYSICIAN - GO DIRECTLY TO THE EMERGENCY ROOM.  Zachery Augustin MD  10/17/2024 9:24:18 AM  This report has been verified and signed electronically.  Dear patient,  As a result of recent federal legislation (The Federal Cures Act), you may   receive lab or pathology results from your procedure in your [facility]   account before your physician is able to contact you. Your physician or   their representative will relay the results to you with their   recommendations at their soonest availability.  Thank you,  PROVATION

## 2024-10-18 LAB
FINAL PATHOLOGIC DIAGNOSIS: NORMAL
GROSS: NORMAL
Lab: NORMAL

## 2024-11-11 DIAGNOSIS — G62.0 NEUROPATHY DUE TO CHEMOTHERAPEUTIC DRUG: ICD-10-CM

## 2024-11-11 DIAGNOSIS — T45.1X5A NEUROPATHY DUE TO CHEMOTHERAPEUTIC DRUG: ICD-10-CM

## 2024-11-11 RX ORDER — PREGABALIN 75 MG/1
75 CAPSULE ORAL 2 TIMES DAILY
Qty: 60 CAPSULE | Refills: 0 | Status: SHIPPED | OUTPATIENT
Start: 2024-11-11 | End: 2024-12-12

## 2024-11-22 ENCOUNTER — PATIENT MESSAGE (OUTPATIENT)
Dept: INTERNAL MEDICINE | Facility: CLINIC | Age: 59
End: 2024-11-22
Payer: COMMERCIAL

## 2024-12-11 DIAGNOSIS — K21.9 ACID REFLUX: ICD-10-CM

## 2024-12-11 DIAGNOSIS — G62.0 NEUROPATHY DUE TO CHEMOTHERAPEUTIC DRUG: ICD-10-CM

## 2024-12-11 DIAGNOSIS — T45.1X5A NEUROPATHY DUE TO CHEMOTHERAPEUTIC DRUG: ICD-10-CM

## 2024-12-11 RX ORDER — PREGABALIN 75 MG/1
75 CAPSULE ORAL 2 TIMES DAILY
Qty: 60 CAPSULE | Refills: 0 | Status: SHIPPED | OUTPATIENT
Start: 2024-12-11 | End: 2025-01-11

## 2024-12-11 RX ORDER — OMEPRAZOLE 40 MG/1
40 CAPSULE, DELAYED RELEASE ORAL DAILY
Qty: 60 CAPSULE | Refills: 11 | Status: SHIPPED | OUTPATIENT
Start: 2024-12-11 | End: 2025-12-11

## 2024-12-11 NOTE — TELEPHONE ENCOUNTER
Please see the attached refill request. Kayleigh's patient, but Dr. Lamb prescribes her Lyrica. Please advise on this refill request during Dr. Lamb absence. Thanks

## 2025-01-10 DIAGNOSIS — T45.1X5A NEUROPATHY DUE TO CHEMOTHERAPEUTIC DRUG: ICD-10-CM

## 2025-01-10 DIAGNOSIS — G62.0 NEUROPATHY DUE TO CHEMOTHERAPEUTIC DRUG: ICD-10-CM

## 2025-01-10 RX ORDER — PREGABALIN 75 MG/1
75 CAPSULE ORAL 2 TIMES DAILY
Qty: 60 CAPSULE | Refills: 0 | Status: SHIPPED | OUTPATIENT
Start: 2025-01-10 | End: 2025-02-09

## 2025-01-10 NOTE — TELEPHONE ENCOUNTER
Mother to breastfeed infant 8 or more times in 24hrs on infant's cue until content, frequent skin to skin, and will avoid pacifiers.  Mother is to keep infant actively feeding by keeping infant stimulated and using breast compression. Mother ensure effective nursing by hearing infant swallows and feeling nice tugs and pulls. Latch should not be painful while nursing.  Mother to record all breastfeedings, voids and stools in breastfeeding guide. Mother to call LC for breastfeeding assistance or questions .  Refer breastfeeding guide for lactation education.       Nurse, pump, supplement.    Please advise on this refill request. Lyrica for one of Kayleigh's patients. Thanks

## 2025-02-11 DIAGNOSIS — T45.1X5A NEUROPATHY DUE TO CHEMOTHERAPEUTIC DRUG: ICD-10-CM

## 2025-02-11 DIAGNOSIS — G62.0 NEUROPATHY DUE TO CHEMOTHERAPEUTIC DRUG: ICD-10-CM

## 2025-02-11 RX ORDER — PREGABALIN 75 MG/1
75 CAPSULE ORAL 2 TIMES DAILY
Qty: 60 CAPSULE | Refills: 0 | OUTPATIENT
Start: 2025-02-11 | End: 2025-03-13

## 2025-02-12 RX ORDER — PREGABALIN 75 MG/1
75 CAPSULE ORAL 2 TIMES DAILY
Qty: 60 CAPSULE | Refills: 0 | Status: SHIPPED | OUTPATIENT
Start: 2025-02-12 | End: 2025-03-14

## 2025-02-12 NOTE — TELEPHONE ENCOUNTER
Anesthesia Pre Eval Note    Anesthesia ROS/Med Hx        Anesthetic Complication History:    History of postoperative nausea & vomiting    Pulmonary Review:  Patient does not have a pulmonary history      Neuro/Psych Review:  Patient does not have a neuro/psych history       Cardiovascular Review:  Patient does not have a cardiovascular history       GI/HEPATIC/RENAL Review:  Comments: DysphagiaPositive for hiatal hernia  Positive for GERD    End/Other Review:    Positive for obesity class I - 30.00 - 34.99  Additional Results:     ALLERGIES:   -- Codeine -- Other (See Comments)    --  nightmares        Relevant Problems   No relevant active problems       Physical Exam       Cardiovascular  Cardiovascular exam normal    General Assessment  General Assessment: Alert and oriented and No acute distress    Dental Exam    Patient has:  Denied broken/chipped/loose teeth    Pulmonary Exam    Patient Demonstrates:  Non-labored Breathing    Abdominal Exam    Patient Demonstrates:  Obese      Anesthesia Plan:  No phone call attempted due to:  ASA Status: 2  Anesthesia Type: MAC    Induction: Intravenous  Maintenance: TIVA  Premedication: None      Post-op Pain Management: Per Surgeon      Checklist  Reviewed: Lab Results, Patient Summary, Allergies, Past Med History, Medications, Problem list, Nursing Notes, Beta Blocker Status and NPO Status  Consent/Risks Discussed Statement:  The proposed anesthetic plan, including its risks and benefits, have been discussed with the Patient along with the risks and benefits of alternatives. Questions were encouraged and answered and the patient and/or representative understands and agrees to proceed.        I discussed with the patient (and/or patient's legal representative) the risks and benefits of the proposed anesthesia plan, MAC, which may include services performed by other anesthesia providers.    Alternative anesthesia plans, if available, were reviewed with the patient (and/or  Please fill medication for pt. Kayleigh is unable to.     LOV 10/1/2024      Requested Prescriptions     Pending Prescriptions Disp Refills    pregabalin (LYRICA) 75 MG capsule 60 capsule 0     Sig: Take 1 capsule (75 mg total) by mouth 2 (two) times daily.     Refused Prescriptions Disp Refills    pregabalin (LYRICA) 75 MG capsule 60 capsule 0     Sig: Take 1 capsule (75 mg total) by mouth 2 (two) times daily.     Refused By: KAYLEIGH YI     Reason for Refusal: Other (See comments)        patient's legal representative). Discussion has been held with the patient (and/or patient's legal representative) regarding risks of anesthesia, which include aspiration, dental injury, intra-operative awareness, oral injury and sore throat and emergent situations that may require change in anesthesia plan.    The patient (and/or patient's legal representative) has indicated understanding, his/her questions have been answered, and he/she wishes to proceed with the planned anesthetic.

## 2025-03-13 DIAGNOSIS — G62.0 NEUROPATHY DUE TO CHEMOTHERAPEUTIC DRUG: ICD-10-CM

## 2025-03-13 DIAGNOSIS — T45.1X5A NEUROPATHY DUE TO CHEMOTHERAPEUTIC DRUG: ICD-10-CM

## 2025-03-13 RX ORDER — PREGABALIN 75 MG/1
75 CAPSULE ORAL 2 TIMES DAILY
Qty: 60 CAPSULE | Refills: 0 | OUTPATIENT
Start: 2025-03-13 | End: 2025-04-12

## 2025-03-13 NOTE — TELEPHONE ENCOUNTER
Please refill Rx for Kayleigh STOVER 10/1/2024    Requested Prescriptions     Pending Prescriptions Disp Refills    pregabalin (LYRICA) 75 MG capsule 60 capsule 0     Sig: Take 1 capsule (75 mg total) by mouth 2 (two) times daily.     Refused Prescriptions Disp Refills    pregabalin (LYRICA) 75 MG capsule 60 capsule 0     Sig: Take 1 capsule (75 mg total) by mouth 2 (two) times daily.     Refused By: KAYLEIGH YI     Reason for Refusal: Refill not appropriate

## 2025-03-14 DIAGNOSIS — G62.0 NEUROPATHY DUE TO CHEMOTHERAPEUTIC DRUG: ICD-10-CM

## 2025-03-14 DIAGNOSIS — T45.1X5A NEUROPATHY DUE TO CHEMOTHERAPEUTIC DRUG: ICD-10-CM

## 2025-03-14 RX ORDER — PREGABALIN 75 MG/1
75 CAPSULE ORAL 2 TIMES DAILY
Qty: 60 CAPSULE | Refills: 0 | OUTPATIENT
Start: 2025-03-14 | End: 2025-04-13

## 2025-03-14 RX ORDER — PREGABALIN 75 MG/1
75 CAPSULE ORAL 2 TIMES DAILY
Qty: 60 CAPSULE | Refills: 0 | Status: SHIPPED | OUTPATIENT
Start: 2025-03-14 | End: 2025-04-13

## 2025-04-14 DIAGNOSIS — T45.1X5A NEUROPATHY DUE TO CHEMOTHERAPEUTIC DRUG: ICD-10-CM

## 2025-04-14 DIAGNOSIS — G62.0 NEUROPATHY DUE TO CHEMOTHERAPEUTIC DRUG: ICD-10-CM

## 2025-04-14 RX ORDER — PREGABALIN 75 MG/1
75 CAPSULE ORAL 2 TIMES DAILY
Qty: 60 CAPSULE | Refills: 0 | Status: SHIPPED | OUTPATIENT
Start: 2025-04-14 | End: 2025-05-14

## 2025-05-13 DIAGNOSIS — T45.1X5A NEUROPATHY DUE TO CHEMOTHERAPEUTIC DRUG: ICD-10-CM

## 2025-05-13 DIAGNOSIS — G62.0 NEUROPATHY DUE TO CHEMOTHERAPEUTIC DRUG: ICD-10-CM

## 2025-05-13 RX ORDER — PREGABALIN 75 MG/1
75 CAPSULE ORAL 2 TIMES DAILY
Qty: 60 CAPSULE | Refills: 0 | Status: SHIPPED | OUTPATIENT
Start: 2025-05-13 | End: 2025-06-13

## 2025-05-13 NOTE — TELEPHONE ENCOUNTER
Refilled.  I reminder that a patient on controlled substances needs to be seen by their PCP at least every 6 months for documentation of stability on their medication.  Looks like she is about due.

## 2025-05-13 NOTE — TELEPHONE ENCOUNTER
Please see the attached refill request. You are the provider who fills this for Kayleigh's patient.

## 2025-05-20 ENCOUNTER — PATIENT MESSAGE (OUTPATIENT)
Dept: INTERNAL MEDICINE | Facility: CLINIC | Age: 60
End: 2025-05-20
Payer: COMMERCIAL

## 2025-06-04 ENCOUNTER — OFFICE VISIT (OUTPATIENT)
Dept: OBSTETRICS AND GYNECOLOGY | Facility: CLINIC | Age: 60
End: 2025-06-04
Payer: COMMERCIAL

## 2025-06-04 VITALS
HEART RATE: 65 BPM | BODY MASS INDEX: 34.55 KG/M2 | HEIGHT: 61 IN | WEIGHT: 183 LBS | DIASTOLIC BLOOD PRESSURE: 78 MMHG | SYSTOLIC BLOOD PRESSURE: 108 MMHG

## 2025-06-04 DIAGNOSIS — Z01.419 WELL WOMAN EXAM WITH ROUTINE GYNECOLOGICAL EXAM: Primary | ICD-10-CM

## 2025-06-04 PROCEDURE — 99396 PREV VISIT EST AGE 40-64: CPT | Mod: S$GLB,,, | Performed by: STUDENT IN AN ORGANIZED HEALTH CARE EDUCATION/TRAINING PROGRAM

## 2025-06-04 PROCEDURE — 1160F RVW MEDS BY RX/DR IN RCRD: CPT | Mod: CPTII,S$GLB,, | Performed by: STUDENT IN AN ORGANIZED HEALTH CARE EDUCATION/TRAINING PROGRAM

## 2025-06-04 PROCEDURE — 3078F DIAST BP <80 MM HG: CPT | Mod: CPTII,S$GLB,, | Performed by: STUDENT IN AN ORGANIZED HEALTH CARE EDUCATION/TRAINING PROGRAM

## 2025-06-04 PROCEDURE — 3008F BODY MASS INDEX DOCD: CPT | Mod: CPTII,S$GLB,, | Performed by: STUDENT IN AN ORGANIZED HEALTH CARE EDUCATION/TRAINING PROGRAM

## 2025-06-04 PROCEDURE — 1159F MED LIST DOCD IN RCRD: CPT | Mod: CPTII,S$GLB,, | Performed by: STUDENT IN AN ORGANIZED HEALTH CARE EDUCATION/TRAINING PROGRAM

## 2025-06-04 PROCEDURE — 99999 PR PBB SHADOW E&M-EST. PATIENT-LVL III: CPT | Mod: PBBFAC,,, | Performed by: STUDENT IN AN ORGANIZED HEALTH CARE EDUCATION/TRAINING PROGRAM

## 2025-06-04 PROCEDURE — 3074F SYST BP LT 130 MM HG: CPT | Mod: CPTII,S$GLB,, | Performed by: STUDENT IN AN ORGANIZED HEALTH CARE EDUCATION/TRAINING PROGRAM

## 2025-06-14 DIAGNOSIS — T45.1X5A NEUROPATHY DUE TO CHEMOTHERAPEUTIC DRUG: ICD-10-CM

## 2025-06-14 DIAGNOSIS — G62.0 NEUROPATHY DUE TO CHEMOTHERAPEUTIC DRUG: ICD-10-CM

## 2025-06-16 RX ORDER — PREGABALIN 75 MG/1
75 CAPSULE ORAL 2 TIMES DAILY
Qty: 60 CAPSULE | Refills: 0 | Status: SHIPPED | OUTPATIENT
Start: 2025-06-16 | End: 2025-07-16

## 2025-06-23 ENCOUNTER — HOSPITAL ENCOUNTER (OUTPATIENT)
Dept: RADIOLOGY | Facility: HOSPITAL | Age: 60
Discharge: HOME | End: 2025-06-23
Attending: NURSE PRACTITIONER
Payer: COMMERCIAL

## 2025-06-23 ENCOUNTER — RESULTS FOLLOW-UP (OUTPATIENT)
Dept: INTERNAL MEDICINE | Facility: CLINIC | Age: 60
End: 2025-06-23

## 2025-06-23 VITALS — WEIGHT: 182 LBS | BODY MASS INDEX: 34.36 KG/M2 | HEIGHT: 61 IN

## 2025-06-23 DIAGNOSIS — Z12.31 ENCOUNTER FOR SCREENING MAMMOGRAM FOR BREAST CANCER: ICD-10-CM

## 2025-06-23 PROCEDURE — 77063 BREAST TOMOSYNTHESIS BI: CPT | Mod: 26,,, | Performed by: RADIOLOGY

## 2025-06-23 PROCEDURE — 77067 SCR MAMMO BI INCL CAD: CPT | Mod: 26,,, | Performed by: RADIOLOGY

## 2025-06-23 PROCEDURE — 77067 SCR MAMMO BI INCL CAD: CPT | Mod: TC

## 2025-06-24 ENCOUNTER — OFFICE VISIT (OUTPATIENT)
Dept: INTERNAL MEDICINE | Facility: CLINIC | Age: 60
End: 2025-06-24
Payer: COMMERCIAL

## 2025-06-24 VITALS
HEART RATE: 65 BPM | DIASTOLIC BLOOD PRESSURE: 78 MMHG | BODY MASS INDEX: 34.72 KG/M2 | SYSTOLIC BLOOD PRESSURE: 118 MMHG | OXYGEN SATURATION: 98 % | HEIGHT: 61 IN | WEIGHT: 183.88 LBS | RESPIRATION RATE: 18 BRPM

## 2025-06-24 DIAGNOSIS — T45.1X5A NEUROPATHY DUE TO CHEMOTHERAPEUTIC DRUG: ICD-10-CM

## 2025-06-24 DIAGNOSIS — R12 HEART BURN: ICD-10-CM

## 2025-06-24 DIAGNOSIS — Z85.038 HISTORY OF COLON CANCER: ICD-10-CM

## 2025-06-24 DIAGNOSIS — T45.1X5A CHEMOTHERAPY-INDUCED NEUROPATHY: Primary | ICD-10-CM

## 2025-06-24 DIAGNOSIS — G62.0 CHEMOTHERAPY-INDUCED NEUROPATHY: Primary | ICD-10-CM

## 2025-06-24 DIAGNOSIS — G62.0 NEUROPATHY DUE TO CHEMOTHERAPEUTIC DRUG: ICD-10-CM

## 2025-06-24 PROCEDURE — 1160F RVW MEDS BY RX/DR IN RCRD: CPT | Mod: CPTII,S$GLB,, | Performed by: NURSE PRACTITIONER

## 2025-06-24 PROCEDURE — 3008F BODY MASS INDEX DOCD: CPT | Mod: CPTII,S$GLB,, | Performed by: NURSE PRACTITIONER

## 2025-06-24 PROCEDURE — 99214 OFFICE O/P EST MOD 30 MIN: CPT | Mod: S$GLB,,, | Performed by: NURSE PRACTITIONER

## 2025-06-24 PROCEDURE — 1159F MED LIST DOCD IN RCRD: CPT | Mod: CPTII,S$GLB,, | Performed by: NURSE PRACTITIONER

## 2025-06-24 PROCEDURE — 3078F DIAST BP <80 MM HG: CPT | Mod: CPTII,S$GLB,, | Performed by: NURSE PRACTITIONER

## 2025-06-24 PROCEDURE — 99999 PR PBB SHADOW E&M-EST. PATIENT-LVL III: CPT | Mod: PBBFAC,,, | Performed by: NURSE PRACTITIONER

## 2025-06-24 PROCEDURE — 3074F SYST BP LT 130 MM HG: CPT | Mod: CPTII,S$GLB,, | Performed by: NURSE PRACTITIONER

## 2025-06-24 RX ORDER — PREGABALIN 75 MG/1
75 CAPSULE ORAL 2 TIMES DAILY
Qty: 60 CAPSULE | Refills: 5 | Status: SHIPPED | OUTPATIENT
Start: 2025-06-24 | End: 2025-07-24

## 2025-06-24 NOTE — PROGRESS NOTES
Subjective:       Patient ID: Irene Galo is a 59 y.o. female.    Chief Complaint: Follow-up (6 months)      History of Present Illness    CHIEF COMPLAINT:  Irene presents today for follow up    HISTORY OF PRESENT ILLNESS:  She reports experiencing a summer cold for approximately 1.5 weeks with significant sinus congestion and fatigue. She localizes congestion to her sinus area, describing feeling blocked up and uncomfortable. She has been taking Mucinex and attempted vics nasal spray but found it too strong.    MEDICAL HISTORY:  She has a history of cancer treated with chemotherapy. She experiences chemo-induced neuropathy, which worsens during prolonged activity and at night, particularly when unable to rest or elevate her legs. The neuropathy has persisted for over a year post-chemotherapy. She finds a weighted blanket provides more symptomatic relief than OTC pain medications. Lyrica does help    MEDICATIONS:  She takes Lyrica and Prozac regularly. She experiences tingling sensations when missing Lyrica doses, attributed to chemotherapy-induced neuropathy. All other medications are OTC.    PREVENTIVE CARE:  Tdap vaccine is current, received 3 years ago. Recent mammogram was negative. Colonoscopy completed in 2024 with follow-up recommended in 3 years.      ROS:  ENT: +nasal congestion, +post nasal drip  Neurological: +nerve pain          Objective:      Physical Exam    General: No acute distress. Well-developed. Well-nourished.  Eyes: EOMI. Sclerae anicteric.  HENT: Normocephalic. Atraumatic. Nares patent. Moist oral mucosa. All normal.  Ears: Bilateral TMs clear. Bilateral EACs clear.  Cardiovascular: Regular rate. Regular rhythm. No murmurs. No rubs. No gallops. Normal S1, S2.  Respiratory: Normal respiratory effort. Clear to auscultation bilaterally. No rales. No rhonchi. No wheezing.   Abdomen: Soft. Non-tender. Non-distended. Normoactive bowel sounds.  Musculoskeletal: No  obvious  deformity.  Extremities: No lower extremity edema.  Neurological: Alert & oriented x3. No slurred speech. Normal gait.  Psychiatric: Normal mood. Normal affect. Good insight. Good judgment.  Skin: Warm. Dry. No rash.          Assessment:       1. Chemotherapy-induced neuropathy    2. Neuropathy due to chemotherapeutic drug    3. History of colon cancer    4. Heart burn        Plan:     Problem List Items Addressed This Visit       Heart burn    History of colon cancer    Chemotherapy-induced neuropathy - Primary    Relevant Medications    pregabalin (LYRICA) 75 MG capsule     Other Visit Diagnoses         Neuropathy due to chemotherapeutic drug        Relevant Medications    pregabalin (LYRICA) 75 MG capsule          Assessment & Plan    C18.9 Colon cancer  J30.2 Other seasonal allergic rhinitis  G62.82 Radiation-induced polyneuropathy  T45.1X5S Adverse effect of antineoplastic and immunosuppressive drugs, sequela  Z85.9 Personal history of malignant neoplasm, unspecified      COLON CANCER:  - Reviewed colonoscopy schedule.  - Irene's last colonoscopy was in 2024 with next appointment scheduled for 2027.    ## SEASONAL ALLERGIC RHINITIS:  - Evaluated current cold symptoms, determining them to be allergy-related.  - Irene reports feeling sleepy and experiencing rhinorrhea.  - Irene previously used Mucinex and nasal spray for symptoms but found the nasal spray too strong.  - Examination of ears and throat were clear, indicating no infection, consistent with allergic rhinitis.  - Prescribed Flonase nasal spray to open nasal passages and sinuses to facilitate drainage.  - Recommend OTC antihistamine (such as Zyrtec, Claritin, or similar) for 5-7 days to dry up mucus.  - Discussed the drying effect of antihistamines and potential long-term side effects like dry eye and constipation.    ## CHEMOTHERAPY-INDUCED NEUROPATHY:  - Evaluated effectiveness of Lyrica for managing chemotherapy-induced neuropathy, which has persisted  for over 1 year.  - Irene experiences tingling sensation when Lyrica is not taken, indicating ongoing need for medication.  - Prescribed 6-month supply refill of Lyrica for neuropathy management due to its controlled substance status.  - Recommend use of a weighted blanket to help manage neuropathy symptoms, especially at night.    ## HISTORY OF CANCER:  - Irene has a history of cancer, which led to chemotherapy-induced neuropathy.  - Irene is over 1 year post-cancer treatment, with ongoing neuropathy symptoms managed with Lyrica.    ## FOLLOW-UP AND PREVENTIVE CARE:  - Considered pneumonia vaccine due to age and recent immune system concerns, but deferred administration due to current illness.  - Informed patient about the recent change in pneumonia vaccine age recommendation, now starting at age 50.  - Pneumonia vaccine to be administered when patient is feeling better.  - Noted need for updated labs, as last comprehensive panel was from 2023.          This note was generated with the assistance of ambient listening technology. Verbal consent was obtained by the patient and accompanying visitor(s) for the recording of patient appointment to facilitate this note. I attest to having reviewed and edited the generated note for accuracy, though some syntax or spelling errors may persist. Please contact the author of this note for any clarification.

## 2025-08-11 PROBLEM — G62.0 CHEMOTHERAPY-INDUCED NEUROPATHY: Status: RESOLVED | Noted: 2020-01-20 | Resolved: 2025-08-11

## 2025-08-11 PROBLEM — T45.1X5A CHEMOTHERAPY-INDUCED NEUROPATHY: Status: RESOLVED | Noted: 2020-01-20 | Resolved: 2025-08-11

## (undated) DEVICE — SEE MEDLINE ITEM 157216

## (undated) DEVICE — STRIP STERI REIN CLSR 1/2X2IN

## (undated) DEVICE — OBTURATOR BLADELESS 8MM XI

## (undated) DEVICE — COVER LIGHT HANDLE

## (undated) DEVICE — NDL 20GX1-1/2IN IB

## (undated) DEVICE — SUT MONOCRYL 4-0 PS-2

## (undated) DEVICE — SEAL UNIVERSAL 5MM-8MM XI

## (undated) DEVICE — SEE MEDLINE ITEM 157181

## (undated) DEVICE — SEE MEDLINE ITEM 156902

## (undated) DEVICE — LUBRICANT SURGILUBE 2 OZ

## (undated) DEVICE — SEE MEDLINE ITEM 146347

## (undated) DEVICE — Device

## (undated) DEVICE — SOL ELECTROLUBE ANTI-STIC

## (undated) DEVICE — PORT AIRSEAL 12/120MM LPI

## (undated) DEVICE — ADHESIVE MASTISOL VIAL 48/BX

## (undated) DEVICE — DRAPE SCOPE PILLOW WARMER

## (undated) DEVICE — NDL BOX COUNTER

## (undated) DEVICE — SEE MEDLINE ITEM 152487

## (undated) DEVICE — LEGGINGS 48X31 INCH

## (undated) DEVICE — SEE MEDLINE ITEM 154981

## (undated) DEVICE — DRAPE C ARM 42 X 120 10/BX

## (undated) DEVICE — SEE MEDLINE ITEM 146417

## (undated) DEVICE — SCISSOR 5MMX35CM DIRECT DRIVE

## (undated) DEVICE — STAPLER ENDOWRIST 45 BLUE XI

## (undated) DEVICE — SOL NACL 0.9% INJ PF/50151

## (undated) DEVICE — DRAPE ABDOMINAL TIBURON 14X11

## (undated) DEVICE — SOL CLEARIFY VISUALIZATION LAP

## (undated) DEVICE — CLIPPER BLADE MOD 4406 (CAREF)

## (undated) DEVICE — SEE MEDLINE ITEM 157144

## (undated) DEVICE — DRESSING TEGADERM 2 3/8 X 2.75

## (undated) DEVICE — SYR 30CC LUER LOCK

## (undated) DEVICE — TRAY FOLEY 16FR INFECTION CONT

## (undated) DEVICE — SEALER VESSEL DAVINCI XI

## (undated) DEVICE — COVER TIP CURVED SCISSORS XI

## (undated) DEVICE — ELECTRODE REM PLYHSV RETURN 9

## (undated) DEVICE — SET TRI-LUMEN FILTERED TUBE

## (undated) DEVICE — COVER LIGHT HANDLE 80/CA

## (undated) DEVICE — STAPLER CIRCULAR 25MM

## (undated) DEVICE — DRESSING TELFA STRL 4X3 LF

## (undated) DEVICE — SEALER VESSEL EXTEND

## (undated) DEVICE — ADHESIVE DERMABOND ADVANCED

## (undated) DEVICE — TRAY MINOR GEN SURG

## (undated) DEVICE — DRAPE COLUMN DAVINCI XI

## (undated) DEVICE — SEE MEDLINE ITEM 157131

## (undated) DEVICE — DRAPE THYROID WITH ARMBOARD

## (undated) DEVICE — DRAPE ARM DAVINCI XI